# Patient Record
Sex: FEMALE | Race: WHITE | Employment: OTHER | URBAN - METROPOLITAN AREA
[De-identification: names, ages, dates, MRNs, and addresses within clinical notes are randomized per-mention and may not be internally consistent; named-entity substitution may affect disease eponyms.]

---

## 2017-03-15 ENCOUNTER — OFFICE VISIT (OUTPATIENT)
Dept: ORTHOPEDIC SURGERY | Age: 72
End: 2017-03-15

## 2017-03-15 VITALS
BODY MASS INDEX: 33.1 KG/M2 | DIASTOLIC BLOOD PRESSURE: 71 MMHG | OXYGEN SATURATION: 98 % | TEMPERATURE: 97.8 F | WEIGHT: 186.8 LBS | RESPIRATION RATE: 18 BRPM | SYSTOLIC BLOOD PRESSURE: 141 MMHG | HEIGHT: 63 IN | HEART RATE: 89 BPM

## 2017-03-15 DIAGNOSIS — Z98.1 S/P LUMBAR FUSION: ICD-10-CM

## 2017-03-15 DIAGNOSIS — M48.061 LUMBAR SPINAL STENOSIS: ICD-10-CM

## 2017-03-15 DIAGNOSIS — M53.3 CHRONIC LEFT SACROILIAC JOINT PAIN: ICD-10-CM

## 2017-03-15 DIAGNOSIS — M35.3 PMR (POLYMYALGIA RHEUMATICA) (HCC): Chronic | ICD-10-CM

## 2017-03-15 DIAGNOSIS — M53.3 SACROILIAC JOINT DYSFUNCTION: Primary | ICD-10-CM

## 2017-03-15 DIAGNOSIS — G89.29 CHRONIC LEFT SACROILIAC JOINT PAIN: ICD-10-CM

## 2017-03-15 NOTE — PROGRESS NOTES
[facility]  [address] [place] [address], [facility] [address]  Phone: [phone]  Fax: [phone]          HISTORY OF PRESENT ILLNESS:  Vaughn Willoughby is a 73 y.o. female with history of lumbar pain. She states that she recently experienced recurring lower back pain radiating to the left buttock. Pt tried a left SI injection on 09/28/2016 and reports that her pain has returned and is now at a level where she is ready for another injection at this time. She currently takes prednisone 10 mg and is followed by Dr. Laureano Mejia. She is also followed by an endocrinologist since she has been experiencing thyroid issues with the prednisone. Pt continues to take Cymbalta 30 mg. Pt at this time desires to proceed with a left SI injection. Pain Scale: 3/10    PCP: Aislinn Lim MD       Past Medical History:   Diagnosis Date    Arthritis     Hypercholesterolemia     Hypertension     Ill-defined condition     osteopenia    Migraine     PMR [facility]a) ([facility])     Thyroid disease         Social History     Social History    Marital status:      Spouse name: N/A    Number of children: N/A    Years of education: N/A     Occupational History    Not on file. Social History Main Topics    Smoking status: Former Smoker     Packs/day: 1.00     Years: 5.00     Quit date: 1/5/1970    Smokeless tobacco: Former User    Alcohol use 0.5 oz/week     1 Glasses of wine per week      Comment: 4 nights a week    Drug use: No    Sexual activity: Not on file      Comment: Hysterectomy     Other Topics Concern    Not on file     Social History Narrative       Current Outpatient Prescriptions   Medication Sig Dispense Refill    metaxalone (SKELAXIN) 800 mg tablet 1 po bid prn spasm  Indications: MUSCLE SPASM 60 Tab 2    predniSONE (DELTASONE) 5 mg tablet Take 20 mg by mouth daily.       metoprolol succinate (TOPROL-XL) 25 mg XL tablet Take 25 mg by mouth daily.  0    DULoxetine (CYMBALTA) 30 mg capsule Take 30 mg by mouth daily.  LEVOTHYROXINE SODIUM (SYNTHROID PO) Take 100 mcg by mouth daily. 6 days a week      rosuvastatin (CRESTOR) 20 mg tablet Take 20 mg by mouth nightly.  cycloSPORINE (RESTASIS) 0.05 % ophthalmic emulsion Administer 1 Drop to both eyes two (2) times a day.  aspirin 81 mg chewable tablet Take 81 mg by mouth daily.  benazepril (LOTENSIN) 40 mg tablet Take 40 mg by mouth daily.  ALPRAZolam (XANAX) 0.5 mg tablet Take 0.5 mg by mouth.  acetaminophen (TYLENOL EXTRA STRENGTH) 500 mg tablet Take  by mouth every six (6) hours as needed for Pain. Allergies   Allergen Reactions    Levaquin [Levofloxacin] Nausea and Vomiting         REVIEW OF SYSTEMS    Constitutional: Negative for fever, chills, or weight change. Respiratory: Negative for cough or shortness of breath. Cardiovascular: Negative for chest pain or palpitations. Gastrointestinal: Negative for acid reflux, change in bowel habits, or constipation. Genitourinary: Negative for dysuria and flank pain. Musculoskeletal: Positive for lumbar pain. Skin: Negative for rash. Neurological: Negative for headaches, dizziness, or numbness. Endo/Heme/Allergies: Negative for increased bruising. Psychiatric/Behavioral: Negative for difficulty with sleep. PHYSICAL EXAMINATION  Visit Vitals    /71    Pulse 89    Temp 97.8 °F (36.6 °C) (Oral)    Resp 18    Ht 5' 3\" (1.6 m)    Wt 186 lb 12.8 oz (84.7 kg)    SpO2 98%    BMI 33.09 kg/m2       Constitutional: Awake, alert, and in no acute distress  Neurological: 1+ symmetrical DTRs in the lower extremities. Sensation to light touch is intact. Skin: warm, dry, and intact. Musculoskeletal: Tight across the upper trapezius. Tenderness to palpation over the left SI joint injection. No pain with extension, axial loading, or forward flexion. No pain with internal or external rotation of her hips. Negative straight leg raise bilaterally. Hip Flex  Quads Hamstrings Ankle DF EHL Ankle PF   Right +4/5 +4/5 +4/5 +4/5 +4/5 +4/5   Left +4/5 +4/5 +4/5 +4/5 +4/5 +4/5     IMAGING:    Lumbar Spine MRI from 09/22/2016 was personally reviewed with the Pt and demonstrated:   FINDINGS:  Scoliosis of the lumbar spine convex to the left. Grade 1 retrolisthesis L2 on  L3 and L3 on L4. Grade 1 anterior listhesis of L4 on L5. Degenerative discogenic  disease at all levels of the lumbar spine most prominent at L2-L3 where there is  moderate to severe disc space narrowing. Vertebral body heights are maintained. Marrow signal is within normal limits The conus medullaris terminates at  T12-L1. No abnormal enhancement of the thecal sac on postcontrast images. Susceptibility artifact from posterior fusion L2-L5 somewhat limits evaluation  and causes distortion. 2.4 x 0.6 x 0.6 cm fluid collection at the right  laminotomy site at L3-L4. 6.5 cm in length by 1.6 x 1.3 cm lobular T2  hyperintense collection in the left posterior paraspinal soft tissues. There may  be additional small collection posterior to the left spinal canal at L3-L4. Diffuse edema and atrophy of the paraspinal musculature.      L1/2: The spinal canal and neural foramina are widely patent.      L2/3: Diffuse disc bulge and grade 1 retrolisthesis. Mild foraminal stenosis. Mild facet and ligamentous hypertrophy. Central canal is patent.      L3/4: Diffuse disc bulge and slight retrolisthesis. Central canal and neural  foramen are patent.      L4/5: Diffuse disc bulge. Central and neural foramen are patent.      L5/S1: Diffuse disc bulge. Mild/moderate facet arthropathy. Central canal is  patent. Mild left foraminal stenosis.      IMPRESSION:   Posterior fusion L2-L5.  Central canal is patent at these levels.      Fluid collection at the right hemilaminotomy site and in the adjacent posterior  paraspinal musculature most likely representing postoperative fluid  collection/seroma, abscess is unlikely. There may be additional small fluid  posterior to the left the spinal canal at L3-L4.      Mild degenerative changes at L5-S1.    ASSESSMENT   Yessenia Luis was seen today for back pain. Diagnoses and all orders for this visit:    Sacroiliac joint dysfunction  -     SCHEDULE SURGERY    Chronic left sacroiliac joint pain    Lumbar spinal stenosis    PMR (polymyalgia rheumatica) (HCC)    S/P lumbar fusion       IMPRESSION AND PLAN:  Cristo Liao is a 73 y.o. female with history of lumbar pain. She states that she recently experienced recurring lower back pain radiating to the left buttock. Pt tried a left SI injection on 09/28/2016 and reports that she is ready for another injection at this time. 1) Pt was given information on SI exercises. 2) She was scheduled for a left SI joint injection. 3) I encouraged the Pt to start a HEP. 4) Ms. Tiffanie Fernandez has a reminder for a \"due or due soon\" health maintenance. I have asked that she contact her primary care provider, Enrique Berry MD, for follow-up on this health maintenance. 5)  reviewed. 6) Pt will follow-up in 1 month.       Written by Bladimir Ivy, as dictated by Kristal Beltran MD.

## 2017-03-15 NOTE — PATIENT INSTRUCTIONS
Sacroiliac Pain: Exercises  Your Care Instructions  Here are some examples of typical rehabilitation exercises for your condition. Start each exercise slowly. Ease off the exercise if you start to have pain. Your doctor or physical therapist will tell you when you can start these exercises and which ones will work best for you. How to do the exercises  Knee-to-chest stretch    Do not do the knee-to-chest exercise if it causes or increases back or leg pain. 1. Lie on your back with your knees bent and your feet flat on the floor. You can put a small pillow under your head and neck if it is more comfortable. 2. Grasp your hands under one knee and bring the knee to your chest, keeping the other foot flat on the floor. 3. Keep your lower back pressed to the floor. Hold for at least 15 to 30 seconds. 4. Relax and lower the knee to the starting position. Repeat with the other leg. 5. Repeat 2 to 4 times with each leg. 6. To get more stretch, keep your other leg flat on the floor while pulling your knee to your chest.  Bridging    1. Lie on your back with both knees bent. Your knees should be bent about 90 degrees. 2. Tighten your belly muscles by pulling in your belly button toward your spine. Then push your feet into the floor, squeeze your buttocks, and lift your hips off the floor until your shoulders, hips, and knees are all in a straight line. 3. Hold for about 6 seconds as you continue to breathe normally, and then slowly lower your hips back down to the floor and rest for up to 10 seconds. 4. Repeat 8 to 12 times. Hip extension    1. Get down on your hands and knees on the floor. 2. Keeping your back and neck straight, lift one leg straight out behind you. When you lift your leg, keep your hips level. Don't let your back twist, and don't let your hip drop toward the floor. 3. Hold for 6 seconds. Repeat 8 to 12 times with each leg.   4. If you feel steady and strong when you do this exercise, you can make it more difficult. To do this, when you lift your leg, also lift the opposite arm straight out in front of you. For example, lift the left leg and the right arm at the same time. (This is sometimes called the \"bird dog exercise. \") Hold for 6 seconds, and repeat 8 to 12 times on each side. Clamshell    1. Lie on your side with a pillow under your head. Keep your feet and knees together and your knees bent. 2. Raise your top knee, but keep your feet together. Do not let your hips roll back. Your legs should open up like a clamshell. 3. Hold for 6 seconds. 4. Slowly lower your knee back down. Rest for 10 seconds. 5. Repeat 8 to 12 times. 6. Switch to your other side and repeat steps 1 through 5. Hamstring wall stretch    1. Lie on your back in a doorway, with one leg through the open door. 2. Slide your affected leg up the wall to straighten your knee. You should feel a gentle stretch down the back of your leg. ¨ Do not arch your back. ¨ Do not bend either knee. ¨ Keep one heel touching the floor and the other heel touching the wall. Do not point your toes. 3. Hold the stretch for at least 1 minute to begin. Then try to lengthen the time you hold the stretch to as long as 6 minutes. 4. Switch legs, and repeat steps 1 through 3.  5. Repeat 2 to 4 times. If you do not have a place to do this exercise in a doorway, there is another way to do it:  1. Lie on your back, and bend one knee. 2. Loop a towel under the ball and toes of that foot, and hold the ends of the towel in your hands. 3. Straighten your knee, and slowly pull back on the towel. You should feel a gentle stretch down the back of your leg. 4. Switch legs, and repeat steps 1 through 3.  5. Repeat 2 to 4 times. Lower abdominal strengthening    1. Lie on your back with your knees bent and your feet flat on the floor. 2. Tighten your belly muscles by pulling your belly button in toward your spine.   3. Lift one foot off the floor and bring your knee toward your chest, so that your knee is straight above your hip and your leg is bent like the letter \"L. \"  4. Lift the other knee up to the same position. 5. Lower one leg at a time to the starting position. 6. Keep alternating legs until you have lifted each leg 8 to 12 times. 7. Be sure to keep your belly muscles tight and your back still as you are moving your legs. Be sure to breathe normally. Piriformis stretch    1. Lie on your back with your legs straight. 2. Lift your affected leg, and bend your knee. With your opposite hand, reach across your body, and then gently pull your knee toward your opposite shoulder. 3. Hold the stretch for 15 to 30 seconds. 4. Switch legs and repeat steps 1 through 3.  5. Repeat 2 to 4 times. Follow-up care is a key part of your treatment and safety. Be sure to make and go to all appointments, and call your doctor if you are having problems. It's also a good idea to know your test results and keep a list of the medicines you take. Where can you learn more? Go to http://geovanna-tish.info/. Enter E528 in the search box to learn more about \"Sacroiliac Pain: Exercises. \"  Current as of: May 23, 2016  Content Version: 11.1  © 8273-1270 [employer]. Care instructions adapted under license by [employer] (which disclaims liability or warranty for this information). If you have questions about a medical condition or this instruction, always ask your healthcare professional. [address] any warranty or liability for your use of this information.

## 2017-03-29 ENCOUNTER — APPOINTMENT (OUTPATIENT)
Dept: GENERAL RADIOLOGY | Age: 72
End: 2017-03-29
Attending: PHYSICAL MEDICINE & REHABILITATION
Payer: MEDICARE

## 2017-03-29 ENCOUNTER — HOSPITAL ENCOUNTER (OUTPATIENT)
Age: 72
Setting detail: OUTPATIENT SURGERY
Discharge: HOME | End: 2017-03-29
Attending: PHYSICAL MEDICINE & REHABILITATION | Admitting: PHYSICAL MEDICINE & REHABILITATION
Payer: MEDICARE

## 2017-03-29 ENCOUNTER — SURGERY (OUTPATIENT)
Age: 72
End: 2017-03-29

## 2017-03-29 VITALS
SYSTOLIC BLOOD PRESSURE: 167 MMHG | HEART RATE: 91 BPM | WEIGHT: 186 LBS | BODY MASS INDEX: 32.96 KG/M2 | HEIGHT: 63 IN | OXYGEN SATURATION: 93 % | TEMPERATURE: 98.5 F | RESPIRATION RATE: 18 BRPM | DIASTOLIC BLOOD PRESSURE: 87 MMHG

## 2017-03-29 PROCEDURE — 74011636320 HC RX REV CODE- 636/320: Performed by: PHYSICAL MEDICINE & REHABILITATION

## 2017-03-29 PROCEDURE — 74011636320 HC RX REV CODE- 636/320

## 2017-03-29 PROCEDURE — 74011000250 HC RX REV CODE- 250: Performed by: PHYSICAL MEDICINE & REHABILITATION

## 2017-03-29 PROCEDURE — 76010000009 HC PAIN MGT 0 TO 30 MIN PROC: Performed by: PHYSICAL MEDICINE & REHABILITATION

## 2017-03-29 PROCEDURE — 74011250636 HC RX REV CODE- 250/636

## 2017-03-29 PROCEDURE — 74011250636 HC RX REV CODE- 250/636: Performed by: PHYSICAL MEDICINE & REHABILITATION

## 2017-03-29 PROCEDURE — 74011000250 HC RX REV CODE- 250

## 2017-03-29 RX ORDER — DEXAMETHASONE SODIUM PHOSPHATE 100 MG/10ML
INJECTION INTRAMUSCULAR; INTRAVENOUS AS NEEDED
Status: DISCONTINUED | OUTPATIENT
Start: 2017-03-29 | End: 2017-03-29 | Stop reason: HOSPADM

## 2017-03-29 RX ORDER — LIDOCAINE HYDROCHLORIDE 10 MG/ML
INJECTION, SOLUTION EPIDURAL; INFILTRATION; INTRACAUDAL; PERINEURAL AS NEEDED
Status: DISCONTINUED | OUTPATIENT
Start: 2017-03-29 | End: 2017-03-29 | Stop reason: HOSPADM

## 2017-03-29 RX ORDER — DIAZEPAM 5 MG/1
5-20 TABLET ORAL ONCE
Status: DISCONTINUED | OUTPATIENT
Start: 2017-03-29 | End: 2017-03-29 | Stop reason: HOSPADM

## 2017-03-29 RX ADMIN — LIDOCAINE HYDROCHLORIDE 10 ML: 10 INJECTION, SOLUTION EPIDURAL; INFILTRATION; INTRACAUDAL; PERINEURAL at 13:57

## 2017-03-29 RX ADMIN — IOPAMIDOL 2 ML: 408 INJECTION, SOLUTION INTRATHECAL at 13:58

## 2017-03-29 RX ADMIN — DEXAMETHASONE SODIUM PHOSPHATE 30 MG: 10 INJECTION INTRAMUSCULAR; INTRAVENOUS at 13:58

## 2017-03-29 NOTE — PROCEDURES
Procedure Note    Patient Name: Severino Conte    Date of Procedure: March 29, 2017    Preoperative Diagnosis: Sacroiliac Joint Dysfunction    Post Operative Diagnosis: same    Procedure: SI Joint Injection left     Consent: Informed consent was obtained prior to the procedure. The patient was given the opportunity to ask questions regarding the procedure and its associated risks. In addition to the potential risks associated with the procedure itself, the patient was informed both verbally and in writing of potential side effects of the use of glucocorticoids. The patient appeared to comprehend the informed consent and desired to have the procedure performed. Procedure: The patient was placed in the prone position on the flouroscopy table and the back was prepped and draped in the usual sterile manner. A #22 gauge spinal needle was then advanced to lie within the SI joint after local Lidocaine 1% injection. A total of 30 mg of preservative free dexamethasone and 5 cc of Lidocaine was introduced into the SI joint. The injection area was cleaned and bandaids applied. No excessive bleeding was noted. Patient dressed and was discharged to home with instructions. Discussion: The patient tolerated the procedure well.      Juan Luis Wallis MD  March 29, 2017

## 2017-03-29 NOTE — H&P
Date of Surgery Update:  Tobias Smith was seen and examined. History and physical has been reviewed. The patient has been examined. There have been no significant clinical changes since the completion of the last office visit.       Signed By: Yan Nava MD     March 29, 2017 1:01 PM

## 2017-03-29 NOTE — DISCHARGE INSTRUCTIONS
[facility]   ([facility])  Dr. Nida Mantilla, Dr. Gisselle Tamayo, Dr. Pantoja Bent not drive a car, operate heavy machinery or dangerous equipment for 24 hours. * Activity as tolerated; rest for the remainder of the day. * Resume pre-block medications including those for your family doctor. * Do not drink alcoholic beverages for 24 hours. Alcohol and the medications you have received may interact and cause an adverse reaction. * You may feel better this evening and worse tomorrow, as the numbing medications wears off and the steroid has yet to begin to work. After 48 hrs the steroid should begin to release bringing you relief. * You may shower this evening and remove any bandages. * Avoid hot tubs and heating pads for 24 hours. You may use cold packs on the procedure site as tolerated for the first 24 hours. * If a headache develops, drink plenty of fluids and rest.  Take over the counter medications for headache if needed. If the headache continues longer than 24 hours, call MD at the [address] [place] [address]. [phone]    * Continue taking pain medications as needed. * You may resume your regular diet if tolerated. Otherwise, start with sips of water and advance slowly. * If Diabetic: check your blood sugar three times a day for the next 3 days. If your sugar is greater than 300 call your family doctor. If your sugar is greater than 400, have someone transport you to the nearest Emergency Room. * If you experience any of the following problems, Please Call the [address] [place] [address] at [phone].         * Shortness of Breath    * Fever of 101 or higher    * Nausea / Vomiting    * Severe Headache    * Weakness or numbness in arms or legs that is not      resolving    * Prolonged increase in pain greater than 4 days      DISCHARGE SUMMARY from Nurse      PATIENT INSTRUCTIONS:    After oral sedation, for 24 hours or while taking prescription Narcotics:  · Limit your activities  · Do not drive and operate hazardous machinery  · Do not make important personal or business decisions  · Do  not drink alcoholic beverages  · If you have not urinated within 8 hours after discharge, please contact your surgeon on call. Report the following to your surgeon:  · Excessive pain, swelling, redness or odor of or around the surgical area  · Temperature over 101  · Nausea and vomiting lasting longer than 4 hours or if unable to take medications  · Any signs of decreased circulation or nerve impairment to extremity: change in color, persistent  numbness, tingling, coldness or increase pain  · Any questions            What to do at Home:  Recommended activity: Activity as tolerated, NO DRIVING FOR 12 Hours post injection          *  Please give a list of your current medications to your Primary Care Provider. *  Please update this list whenever your medications are discontinued, doses are      changed, or new medications (including over-the-counter products) are added. *  Please carry medication information at all times in case of emergency situations. These are general instructions for a healthy lifestyle:    No smoking/ No tobacco products/ Avoid exposure to second hand smoke    Surgeon General's Warning:  Quitting smoking now greatly reduces serious risk to your health. Obesity, smoking, and sedentary lifestyle greatly increases your risk for illness    A healthy diet, regular physical exercise & weight monitoring are important for maintaining a healthy lifestyle    You may be retaining fluid if you have a history of heart failure or if you experience any of the following symptoms:  Weight gain of 3 pounds or more overnight or 5 pounds in a week, increased swelling in our hands or feet or shortness of breath while lying flat in bed.   Please call your doctor as soon as you notice any of these symptoms; do not wait until your next office visit. Recognize signs and symptoms of STROKE:    F-face looks uneven    A-arms unable to move or move unevenly    S-speech slurred or non-existent    T-time-call 911 as soon as signs and symptoms begin-DO NOT go       Back to bed or wait to see if you get better-TIME IS BRAIN. [employer] Activation    Thank you for requesting access to [employer]. Please follow the instructions below to securely access and download your online medical record. [employer] allows you to send messages to your doctor, view your test results, renew your prescriptions, schedule appointments, and more. How Do I Sign Up? 1. In your internet browser, go to [email or web address]  2. Click on the First Time User? Click Here link in the Sign In box. You will be redirect to the New Member Sign Up page. 3. Enter your [employer] Access Code exactly as it appears below. You will not need to use this code after youve completed the sign-up process. If you do not sign up before the expiration date, you must request a new code. [employer] Access Code: Activation code not generated  Current [employer] Status: Active (This is the date your [employer] access code will )    4. Enter the last four digits of your Social Security Number (xxxx) and Date of Birth (mm/dd/yyyy) as indicated and click Submit. You will be taken to the next sign-up page. 5. Create a [employer] ID. This will be your [employer] login ID and cannot be changed, so think of one that is secure and easy to remember. 6. Create a [employer] password. You can change your password at any time. 7. Enter your Password Reset Question and Answer. This can be used at a later time if you forget your password. 8. Enter your e-mail address. You will receive e-mail notification when new information is available in [address] 9. Click Sign Up. You can now view and download portions of your medical record.   10. Click the Download Summary menu link to download a portable copy of your medical information. Additional Information    If you have questions, please visit the Frequently Asked Questions section of the [email or web address] website at [email or web address] Remember, [email or web address] is NOT to be used for urgent needs. For medical emergencies, dial 911.

## 2017-06-13 ENCOUNTER — OFFICE VISIT (OUTPATIENT)
Dept: ORTHOPEDIC SURGERY | Age: 72
End: 2017-06-13

## 2017-06-13 VITALS
OXYGEN SATURATION: 96 % | WEIGHT: 186 LBS | RESPIRATION RATE: 18 BRPM | BODY MASS INDEX: 32.96 KG/M2 | HEART RATE: 78 BPM | TEMPERATURE: 97.8 F | SYSTOLIC BLOOD PRESSURE: 136 MMHG | DIASTOLIC BLOOD PRESSURE: 66 MMHG | HEIGHT: 63 IN

## 2017-06-13 DIAGNOSIS — Z98.1 S/P LUMBAR FUSION: ICD-10-CM

## 2017-06-13 DIAGNOSIS — M53.3 SACROILIAC JOINT DYSFUNCTION: Primary | ICD-10-CM

## 2017-06-13 DIAGNOSIS — M62.830 MUSCLE SPASM OF BACK: ICD-10-CM

## 2017-06-13 DIAGNOSIS — M43.16 SPONDYLOLISTHESIS OF LUMBAR REGION: ICD-10-CM

## 2017-06-13 DIAGNOSIS — M35.3 PMR (POLYMYALGIA RHEUMATICA) (HCC): Chronic | ICD-10-CM

## 2017-06-13 RX ORDER — DULOXETIN HYDROCHLORIDE 60 MG/1
60 CAPSULE, DELAYED RELEASE ORAL DAILY
COMMUNITY
Start: 2017-05-26

## 2017-06-13 RX ORDER — LIOTHYRONINE SODIUM 5 UG/1
5 TABLET ORAL DAILY
Status: ON HOLD | COMMUNITY
End: 2019-06-19

## 2017-06-13 RX ORDER — ALENDRONATE SODIUM 70 MG/1
70 TABLET ORAL
Status: ON HOLD | COMMUNITY
Start: 2017-05-25 | End: 2019-06-19

## 2017-06-13 RX ORDER — DICLOFENAC EPOLAMINE 0.01 G/1
1 PATCH TOPICAL EVERY 12 HOURS
Qty: 60 PATCH | Refills: 2 | Status: SHIPPED | OUTPATIENT
Start: 2017-06-13 | End: 2017-08-10 | Stop reason: SDUPTHER

## 2017-06-13 RX ORDER — CIPROFLOXACIN HYDROCHLORIDE 3.5 MG/ML
SOLUTION/ DROPS TOPICAL
Refills: 0 | COMMUNITY
Start: 2017-06-11 | End: 2017-08-10 | Stop reason: ALTCHOICE

## 2017-06-13 NOTE — PROGRESS NOTES
[facility]  [address], [facility] [address]  Phone: [phone]  Fax: [phone]      ASSESSMENT   Tiffanie Mcfarlane was seen today for back pain. Diagnoses and all orders for this visit:    Sacroiliac joint dysfunction  -     diclofenac (FLECTOR) 1.3 % pt12; 1 Patch by TransDERmal route every twelve (12) hours every twelve (12) hours. Apply to left hip. Indications: PMR, SI Joint dysfunction    Spondylolisthesis of lumbar region    S/P lumbar fusion    Muscle spasm of back    PMR (polymyalgia rheumatica) (HCC)  -     diclofenac (FLECTOR) 1.3 % pt12; 1 Patch by TransDERmal route every twelve (12) hours every twelve (12) hours. Apply to left hip. Indications: PMR, SI Joint dysfunction         IMPRESSION AND PLAN:  Justin Guevara is a 72 yo female with history of SI pain. She completed injection on 03/15/17 but it did not seem to help as much this time. She will start on SI exercises and Diclofenac patch. 1) Pt was given information on SI exercises. 2) Pt was given prescription for Diclofenac patch. 3) Ms. Florentino Beach has a reminder for a \"due or due soon\" health maintenance. I have asked that she contact her primary care provider, Jose Sanchez MD, for follow-up on this health maintenance. 4)  demonstrated consistency with prescribing. 5) Pt will follow-up in 2 months. HISTORY OF PRESENT ILLNESS:  Justin Guevara is a 73 y.o.  female with history of left SI pain. Pt reports that last SI injection did not help as much as usual. She notes that she went on recent cruise and stairs on the ship aggravated her pain. She did try doing some stretches. She is still taking Prednisone. She continues to follow up with her endocrinologist. She denies any known kidney problems. No history or shingles.        Pain Scale: 3/10    PCP: Jose Sanchez MD     Past Medical History:   Diagnosis Date    Arthritis     Hypercholesterolemia     Hypertension     Ill-defined condition     osteopenia    Migraine     PMR [facility]polym[facility]algia rheumatica) ([facility])     Thyroid disease         Social History     Social History    Marital status:      Spouse name: N/A    Number of children: N/A    Years of education: N/A     Occupational History    Not on file. Social History Main Topics    Smoking status: Former Smoker     Packs/day: 1.00     Years: 5.00     Quit date: 1/5/1970    Smokeless tobacco: Never Used    Alcohol use 0.5 oz/week     1 Glasses of wine per week      Comment: 4 nights a week    Drug use: No    Sexual activity: Not on file      Comment: Hysterectomy     Other Topics Concern    Not on file     Social History Narrative       Current Outpatient Prescriptions   Medication Sig Dispense Refill    alendronate (FOSAMAX) 70 mg tablet       ciprofloxacin HCl (CILOXIN) 0.3 % ophthalmic solution instill 2 drops into left eye twice a day for 5 TO 7 DAYS  0    DULoxetine (CYMBALTA) 60 mg capsule Take 60 mg by mouth daily.  liothyronine (CYTOMEL) 5 mcg tablet Take 5 mcg by mouth every other day.  diclofenac (FLECTOR) 1.3 % pt12 1 Patch by TransDERmal route every twelve (12) hours every twelve (12) hours. Apply to left hip. Indications: PMR, SI Joint dysfunction 60 Patch 2    predniSONE (DELTASONE) 5 mg tablet Take 5 mg by mouth daily.  acetaminophen (TYLENOL EXTRA STRENGTH) 500 mg tablet Take  by mouth every six (6) hours as needed for Pain.  metoprolol succinate (TOPROL-XL) 25 mg XL tablet Take 25 mg by mouth daily. 0    LEVOTHYROXINE SODIUM (SYNTHROID PO) Take 100 mcg by mouth daily. 6 days a week      rosuvastatin (CRESTOR) 20 mg tablet Take 20 mg by mouth nightly.  cycloSPORINE (RESTASIS) 0.05 % ophthalmic emulsion Administer 1 Drop to both eyes two (2) times a day.  aspirin 81 mg chewable tablet Take 81 mg by mouth daily.  benazepril (LOTENSIN) 40 mg tablet Take 40 mg by mouth daily.       ALPRAZolam (XANAX) 0.5 mg tablet Take 0.5 mg by mouth.  metaxalone (SKELAXIN) 800 mg tablet 1 po bid prn spasm  Indications: MUSCLE SPASM 60 Tab 2       Allergies   Allergen Reactions    Levaquin [Levofloxacin] Nausea and Vomiting         REVIEW OF SYSTEMS    Constitutional: Negative for fever, chills, or weight change. Respiratory: Negative for cough or shortness of breath. Cardiovascular: Negative for chest pain or palpitations. Gastrointestinal: Negative for acid reflux, change in bowel habits, or constipation. Genitourinary: Negative for dysuria and flank pain. Musculoskeletal: Positive for left SI pain. Skin: Negative for rash. Neurological: Negative for headaches, dizziness, or numbness. Endo/Heme/Allergies: Negative for increased bruising. Psychiatric/Behavioral: Negative for difficulty with sleep. PHYSICAL EXAMINATION  Visit Vitals    /66    Pulse 78    Temp 97.8 °F (36.6 °C) (Oral)    Resp 18    Ht 5' 3\" (1.6 m)    Wt 186 lb (84.4 kg)    SpO2 96%    BMI 32.95 kg/m2       Constitutional: Awake, alert, and in no acute distress  Neurological: 1+ symmetrical DTRs in the upper extremities. 1+ symmetrical DTRs in the lower extremities. Sensation to light touch is intact. Negative Napoleon's sign bilaterally. Skin: warm, dry, and intact. Musculoskeletal: No pain with extension, axial loading, or forward flexion. No pain with internal or external rotation of her hips. Negative straight leg raise bilaterally. SI pain present (L>R)       Biceps  Triceps Deltoids Wrist Ext Wrist Flex Hand Intrin   Right +4/5 +4/5 +4/5 +4/5 +4/5 +4/5   Left +4/5 +4/5 +4/5 +4/5 +4/5 +4/5      Hip Flex  Quads Hamstrings Ankle DF EHL Ankle PF   Right +4/5 +4/5 +4/5 +4/5 +4/5 +4/5   Left +4/5 +4/5 +4/5 +4/5 +4/5 +4/5     IMAGING:    Lumbar Spine MRI from 09/22/2016 was personally reviewed with the Pt and demonstrated:   FINDINGS:  Scoliosis of the lumbar spine convex to the left.  Grade 1 retrolisthesis L2 on  L3 and L3 on L4. Grade 1 anterior listhesis of L4 on L5. Degenerative discogenic  disease at all levels of the lumbar spine most prominent at L2-L3 where there is  moderate to severe disc space narrowing. Vertebral body heights are maintained. Marrow signal is within normal limits The conus medullaris terminates at  T12-L1. No abnormal enhancement of the thecal sac on postcontrast images. Susceptibility artifact from posterior fusion L2-L5 somewhat limits evaluation  and causes distortion. 2.4 x 0.6 x 0.6 cm fluid collection at the right  laminotomy site at L3-L4. 6.5 cm in length by 1.6 x 1.3 cm lobular T2  hyperintense collection in the left posterior paraspinal soft tissues. There may  be additional small collection posterior to the left spinal canal at L3-L4. Diffuse edema and atrophy of the paraspinal musculature.      L1/2: The spinal canal and neural foramina are widely patent.      L2/3: Diffuse disc bulge and grade 1 retrolisthesis. Mild foraminal stenosis. Mild facet and ligamentous hypertrophy. Central canal is patent.      L3/4: Diffuse disc bulge and slight retrolisthesis. Central canal and neural  foramen are patent.      L4/5: Diffuse disc bulge. Central and neural foramen are patent.      L5/S1: Diffuse disc bulge. Mild/moderate facet arthropathy. Central canal is  patent. Mild left foraminal stenosis.      IMPRESSION:   Posterior fusion L2-L5. Central canal is patent at these levels.      Fluid collection at the right hemilaminotomy site and in the adjacent posterior  paraspinal musculature most likely representing postoperative fluid  collection/seroma, abscess is unlikely. There may be additional small fluid  posterior to the left the spinal canal at L3-L4.      Mild degenerative changes at L5-S1. Written by Corrinne Card, as dictated by Loreto Jackson MD.  I, Dr. Loreto Jackson confirm that all documentation is accurate.

## 2017-06-13 NOTE — MR AVS SNAPSHOT
Visit Information Date & Time Provider Department Dept. Phone Encounter #  
 6/13/2017  9:30 AM Arlene Chin, [address] [facility] [phone] [id number] Follow-up Instructions Return in about 2 months (around 8/13/2017) for Medication follow up. Upcoming Health Maintenance Date Due Hepatitis C Screening 1945 DTaP/Tdap/Td series (1 - Tdap) 11/10/1968 FOBT Q 1 YEAR AGE 50-75 11/10/1997 ZOSTER VACCINE AGE 60> 11/10/2007 GLAUCOMA SCREENING Q2Y 11/10/2012 OSTEOPOROSIS SCREENING (DEXA) 11/10/2012 Pneumococcal 65+ Low/Medium Risk (1 of 2 - PCV13) 11/10/2012 MEDICARE YEARLY EXAM 11/10/2012 BREAST CANCER SCRN MAMMOGRAM 11/3/2013 INFLUENZA AGE 9 TO ADULT 8/1/2017 Allergies as of 6/13/2017  Review Complete On: 6/13/2017 By: Arlene Chin MD  
  
 Severity Noted Reaction Type Reactions Levaquin [Levofloxacin]  03/05/2015    Nausea and Vomiting Current Immunizations  Never Reviewed No immunizations on file. Not reviewed this visit You Were Diagnosed With   
  
 Codes Comments Sacroiliac joint dysfunction    -  Primary ICD-10-CM: M53.3 ICD-9-CM: 724.6 PMR (polymyalgia rheumatica) (HCC)     ICD-10-CM: M35.3 ICD-9-CM: 352 Vitals BP Pulse Temp Resp Height(growth percentile) Weight(growth percentile) 136/66 78 97.8 °F (36.6 °C) (Oral) 18 5' 3\" (1.6 m) 186 lb (84.4 kg) SpO2 BMI OB Status Smoking Status 96% 32.95 kg/m2 Hysterectomy Former Smoker Vitals History BMI and BSA Data Body Mass Index Body Surface Area 32.95 kg/m 2 1.94 m 2 Preferred Pharmacy Pharmacy Name Phone Eladio Llanos, [address] [facility] [phone] Your Updated Medication List  
  
   
This list is accurate as of: 6/13/17 10:33 AM.  Always use your most recent med list.  
  
  
  
  
 alendronate 70 mg tablet Commonly known as:  FOSAMAX  
  
 aspirin 81 mg chewable tablet Take 81 mg by mouth daily. ciprofloxacin HCl 0.3 % ophthalmic solution Commonly known as:  CILOXIN  
instill 2 drops into left eye twice a day for 5 TO 7 DAYS  
  
 CRESTOR 20 mg tablet Generic drug:  rosuvastatin Take 20 mg by mouth nightly. CYTOMEL 5 mcg tablet Generic drug:  liothyronine Take 5 mcg by mouth every other day. diclofenac 1.3 % Pt12 Commonly known as:  FLECTOR  
1 Patch by TransDERmal route every twelve (12) hours every twelve (12) hours. Apply to left hip. Indications: PMR, SI Joint dysfunction DULoxetine 60 mg capsule Commonly known as:  CYMBALTA Take 60 mg by mouth daily. LOTENSIN 40 mg tablet Generic drug:  benazepril Take 40 mg by mouth daily. metaxalone 800 mg tablet Commonly known as:  SKELAXIN  
1 po bid prn spasm  Indications: MUSCLE SPASM  
  
 metoprolol succinate 25 mg XL tablet Commonly known as:  TOPROL-XL Take 25 mg by mouth daily. predniSONE 5 mg tablet Commonly known as:  Mike Jeffers Take 5 mg by mouth daily. RESTASIS 0.05 % ophthalmic emulsion Generic drug:  cycloSPORINE Administer 1 Drop to both eyes two (2) times a day. SYNTHROID PO Take 100 mcg by mouth daily. 6 days a week TYLENOL EXTRA STRENGTH 500 mg tablet Generic drug:  acetaminophen Take  by mouth every six (6) hours as needed for Pain. XANAX 0.5 mg tablet Generic drug:  ALPRAZolam  
Take 0.5 mg by mouth. Prescriptions Printed Refills  
 diclofenac (FLECTOR) 1.3 % pt12 2 Si Patch by TransDERmal route every twelve (12) hours every twelve (12) hours. Apply to left hip. Indications: PMR, SI Joint dysfunction Class: Print Route: TransDERmal  
  
Follow-up Instructions Return in about 2 months (around 2017) for Medication follow up. Introducing [facility]!    
 Dear Alberta Ramirez: 
 Thank you for requesting a [employer] account. Our records indicate that you already have an active [employer] account. You can access your account anytime at [email or web address]. [email or web address] Did you know that you can access your hospital and ER discharge instructions at any time in [employer]? You can also review all of your test results from your hospital stay or ER visit. Additional Information If you have questions, please visit the Frequently Asked Questions section of the [employer] website at [email or web address]. [email or web address] Remember, [employer] is NOT to be used for urgent needs. For medical emergencies, dial 911. Now available from your iPhone and Android! Please provide this summary of care documentation to your next provider. Your primary care clinician is listed as Garcia Byrd. If you have any questions after today's visit, please call [phone].

## 2017-06-14 ENCOUNTER — TELEPHONE (OUTPATIENT)
Dept: ORTHOPEDIC SURGERY | Age: 72
End: 2017-06-14

## 2017-06-14 NOTE — TELEPHONE ENCOUNTER
Dani Garza pt  called in states that in order for the pt to get her Flector Rx filled the pt needs a medical necessity form filled out. Dani Garza is trying to pick it up today 06/14/17.     Please advise Dani Garza at [phone]

## 2017-06-14 NOTE — TELEPHONE ENCOUNTER
Form for Medical Necessity for Flector patch was filled out and given to the patients . Copy was also sent to scanning.

## 2017-08-10 ENCOUNTER — OFFICE VISIT (OUTPATIENT)
Dept: ORTHOPEDIC SURGERY | Age: 72
End: 2017-08-10

## 2017-08-10 VITALS
HEIGHT: 63 IN | RESPIRATION RATE: 18 BRPM | DIASTOLIC BLOOD PRESSURE: 62 MMHG | HEART RATE: 103 BPM | SYSTOLIC BLOOD PRESSURE: 144 MMHG | WEIGHT: 186.8 LBS | BODY MASS INDEX: 33.1 KG/M2

## 2017-08-10 DIAGNOSIS — M47.816 LUMBAR FACET ARTHROPATHY: ICD-10-CM

## 2017-08-10 DIAGNOSIS — M53.3 SACROILIAC JOINT DYSFUNCTION: ICD-10-CM

## 2017-08-10 DIAGNOSIS — M62.830 MUSCLE SPASM OF BACK: ICD-10-CM

## 2017-08-10 DIAGNOSIS — M35.3 PMR (POLYMYALGIA RHEUMATICA) (HCC): Chronic | ICD-10-CM

## 2017-08-10 DIAGNOSIS — M48.061 LUMBAR SPINAL STENOSIS: Primary | ICD-10-CM

## 2017-08-10 DIAGNOSIS — Z98.1 S/P LUMBAR SPINAL FUSION: ICD-10-CM

## 2017-08-10 RX ORDER — DICLOFENAC EPOLAMINE 0.01 G/1
1 PATCH TOPICAL EVERY 12 HOURS
Qty: 60 PATCH | Refills: 5 | Status: SHIPPED | OUTPATIENT
Start: 2017-08-10 | End: 2018-02-15 | Stop reason: SDUPTHER

## 2017-08-10 NOTE — MR AVS SNAPSHOT
Visit Information Date & Time Provider Department Dept. Phone Encounter #  
 8/10/2017  2:15 PM Paresh Douglass MD [facility] [place] [phone] Follow-up Instructions Return in about 6 months (around 2/10/2018) for Medication follow up. Upcoming Health Maintenance Date Due Hepatitis C Screening 1945 DTaP/Tdap/Td series (1 - Tdap) 11/10/1968 FOBT Q 1 YEAR AGE 50-75 11/10/1997 ZOSTER VACCINE AGE 60> 9/10/2007 GLAUCOMA SCREENING Q2Y 11/10/2012 OSTEOPOROSIS SCREENING (DEXA) 11/10/2012 Pneumococcal 65+ Low/Medium Risk (1 of 2 - PCV13) 11/10/2012 MEDICARE YEARLY EXAM 11/10/2012 BREAST CANCER SCRN MAMMOGRAM 11/3/2013 INFLUENZA AGE 9 TO ADULT 8/1/2017 Allergies as of 8/10/2017  Review Complete On: 8/10/2017 By: Paresh Douglass MD  
  
 Severity Noted Reaction Type Reactions Levaquin [Levofloxacin]  03/05/2015    Nausea and Vomiting Current Immunizations  Never Reviewed No immunizations on file. Not reviewed this visit You Were Diagnosed With   
  
 Codes Comments Lumbar spinal stenosis    -  Primary ICD-10-CM: M48.06 
ICD-9-CM: 724.02 Sacroiliac joint dysfunction     ICD-10-CM: M53.3 ICD-9-CM: 724.6 PMR (polymyalgia rheumatica) (HCC)     ICD-10-CM: M35.3 ICD-9-CM: 333 S/P lumbar spinal fusion     ICD-10-CM: Z98.1 ICD-9-CM: V45.4 Muscle spasm of back     ICD-10-CM: Y84.039 ICD-9-CM: 724.8 Vitals BP Pulse Resp Height(growth percentile) Weight(growth percentile) BMI  
 144/62 (!) 103 18 5' 3\" (1.6 m) 186 lb 12.8 oz (84.7 kg) 33.09 kg/m2 OB Status Smoking Status Hysterectomy Former Smoker BMI and BSA Data Body Mass Index Body Surface Area 33.09 kg/m 2 1.94 m 2 Preferred Pharmacy Pharmacy Name [address], [facility] [phone] Your Updated Medication List  
  
   
This list is accurate as of: 8/10/17  3:03 PM.  Always use your most recent med list.  
  
  
  
  
 alendronate 70 mg tablet Commonly known as:  FOSAMAX Take 70 mg by mouth every seven (7) days. aspirin 81 mg chewable tablet Take 81 mg by mouth daily. CRESTOR 20 mg tablet Generic drug:  rosuvastatin Take 20 mg by mouth nightly. CYTOMEL 5 mcg tablet Generic drug:  liothyronine Take 5 mcg by mouth daily. diclofenac 1.3 % Pt12 Commonly known as:  FLECTOR  
1 Patch by TransDERmal route every twelve (12) hours every twelve (12) hours. Apply to left hip. Indications: PMR, SI Joint dysfunction DULoxetine 60 mg capsule Commonly known as:  CYMBALTA Take 60 mg by mouth daily. LOTENSIN 40 mg tablet Generic drug:  benazepril Take 40 mg by mouth daily. metaxalone 800 mg tablet Commonly known as:  SKELAXIN  
1 po bid prn spasm  Indications: MUSCLE SPASM  
  
 metoprolol succinate 25 mg XL tablet Commonly known as:  TOPROL-XL Take 25 mg by mouth daily. predniSONE 5 mg tablet Commonly known as:  Alvie [place] Take 5 mg by mouth daily. RESTASIS 0.05 % ophthalmic emulsion Generic drug:  cycloSPORINE Administer 1 Drop to both eyes two (2) times a day. SYNTHROID PO Take 100 mcg by mouth daily. 6 days a week TYLENOL EXTRA STRENGTH 500 mg tablet Generic drug:  acetaminophen Take  by mouth every six (6) hours as needed for Pain. XANAX 0.5 mg tablet Generic drug:  ALPRAZolam  
Take 0.5 mg by mouth. Prescriptions Printed Refills  
 diclofenac (FLECTOR) 1.3 % pt12 5 Si Patch by TransDERmal route every twelve (12) hours every twelve (12) hours. Apply to left hip. Indications: PMR, SI Joint dysfunction Class: Print Route: TransDERmal  
  
Follow-up Instructions Return in about 6 months (around 2/10/2018) for Medication follow up. Patient Instructions Low Back Arthritis: Exercises Your Care Instructions Here are some examples of typical rehabilitation exercises for your condition. Start each exercise slowly. Ease off the exercise if you start to have pain. Your doctor or physical therapist will tell you when you can start these exercises and which ones will work best for you. When you are not being active, find a comfortable position for rest. Some people are comfortable on the floor or a medium-firm bed with a small pillow under their head and another under their knees. Some people prefer to lie on their side with a pillow between their knees. Don't stay in one position for too long. Take short walks (10 to 20 minutes) every 2 to 3 hours. Avoid slopes, hills, and stairs until you feel better. Walk only distances you can manage without pain, especially leg pain. How to do the exercises Pelvic tilt 1. Lie on your back with your knees bent. 2. \"Brace\" your stomachtighten your muscles by pulling in and imagining your belly button moving toward your spine. 3. Press your lower back into the floor. You should feel your hips and pelvis rock back. 4. Hold for 6 seconds while breathing smoothly. 5. Relax and allow your pelvis and hips to rock forward. 6. Repeat 8 to 12 times. Back stretches 1. Get down on your hands and knees on the floor. 2. Relax your head and allow it to droop. Round your back up toward the ceiling until you feel a nice stretch in your upper, middle, and lower back. Hold this stretch for as long as it feels comfortable, or about 15 to 30 seconds. 3. Return to the starting position with a flat back while you are on your hands and knees. 4. Let your back sway by pressing your stomach toward the floor. Lift your buttocks toward the ceiling. 5. Hold this position for 15 to 30 seconds. 6. Repeat 2 to 4 times. Follow-up care is a key part of your treatment and safety.  Be sure to make and go to all appointments, and call your doctor if you are having problems. It's also a good idea to know your test results and keep a list of the medicines you take. Where can you learn more? Go to http://geovanna-tish.info/. Enter O140 in the search box to learn more about \"Low Back Arthritis: Exercises. \" Current as of: March 21, 2017 Content Version: 11.3 © 9519-6434 [employer]. Care instructions adapted under license by [employer] (which disclaims liability or warranty for this information). If you have questions about a medical condition or this instruction, always ask your healthcare professional. [place] [address] any warranty or liability for your use of this information. Sacroiliac Pain: Exercises Your Care Instructions Here are some examples of typical rehabilitation exercises for your condition. Start each exercise slowly. Ease off the exercise if you start to have pain. Your doctor or physical therapist will tell you when you can start these exercises and which ones will work best for you. How to do the exercises Knee-to-chest stretch Do not do the knee-to-chest exercise if it causes or increases back or leg pain. 7. Lie on your back with your knees bent and your feet flat on the floor. You can put a small pillow under your head and neck if it is more comfortable. 8. Grasp your hands under one knee and bring the knee to your chest, keeping the other foot flat on the floor. 9. Keep your lower back pressed to the floor. Hold for at least 15 to 30 seconds. 10. Relax and lower the knee to the starting position. Repeat with the other leg. 11. Repeat 2 to 4 times with each leg. 12. To get more stretch, keep your other leg flat on the floor while pulling your knee to your chest. 
Bridging 7. Lie on your back with both knees bent. Your knees should be bent about 90 degrees. 8. Tighten your belly muscles by pulling in your belly button toward your spine. Then push your feet into the floor, squeeze your buttocks, and lift your hips off the floor until your shoulders, hips, and knees are all in a straight line. 9. Hold for about 6 seconds as you continue to breathe normally, and then slowly lower your hips back down to the floor and rest for up to 10 seconds. 10. Repeat 8 to 12 times. Hip extension 1. Get down on your hands and knees on the floor. 2. Keeping your back and neck straight, lift one leg straight out behind you. When you lift your leg, keep your hips level. Don't let your back twist, and don't let your hip drop toward the floor. 3. Hold for 6 seconds. Repeat 8 to 12 times with each leg. 4. If you feel steady and strong when you do this exercise, you can make it more difficult. To do this, when you lift your leg, also lift the opposite arm straight out in front of you. For example, lift the left leg and the right arm at the same time. (This is sometimes called the \"bird dog exercise. \") Hold for 6 seconds, and repeat 8 to 12 times on each side. Clamshell 1. Lie on your side with a pillow under your head. Keep your feet and knees together and your knees bent. 2. Raise your top knee, but keep your feet together. Do not let your hips roll back. Your legs should open up like a clamshell. 3. Hold for 6 seconds. 4. Slowly lower your knee back down. Rest for 10 seconds. 5. Repeat 8 to 12 times. 6. Switch to your other side and repeat steps 1 through 5. Hamstring wall stretch 1. Lie on your back in a doorway, with one leg through the open door. 2. Slide your affected leg up the wall to straighten your knee. You should feel a gentle stretch down the back of your leg. ¨ Do not arch your back. ¨ Do not bend either knee. ¨ Keep one heel touching the floor and the other heel touching the wall. Do not point your toes. 3. Hold the stretch for at least 1 minute to begin. Then try to lengthen the time you hold the stretch to as long as 6 minutes. 4. Switch legs, and repeat steps 1 through 3. 
5. Repeat 2 to 4 times. If you do not have a place to do this exercise in a doorway, there is another way to do it: 1. Lie on your back, and bend one knee. 2. Loop a towel under the ball and toes of that foot, and hold the ends of the towel in your hands. 3. Straighten your knee, and slowly pull back on the towel. You should feel a gentle stretch down the back of your leg. 4. Switch legs, and repeat steps 1 through 3. 
5. Repeat 2 to 4 times. Lower abdominal strengthening 1. Lie on your back with your knees bent and your feet flat on the floor. 2. Tighten your belly muscles by pulling your belly button in toward your spine. 3. Lift one foot off the floor and bring your knee toward your chest, so that your knee is straight above your hip and your leg is bent like the letter \"L. \" 
4. Lift the other knee up to the same position. 5. Lower one leg at a time to the starting position. 6. Keep alternating legs until you have lifted each leg 8 to 12 times. 7. Be sure to keep your belly muscles tight and your back still as you are moving your legs. Be sure to breathe normally. Piriformis stretch 1. Lie on your back with your legs straight. 2. Lift your affected leg, and bend your knee. With your opposite hand, reach across your body, and then gently pull your knee toward your opposite shoulder. 3. Hold the stretch for 15 to 30 seconds. 4. Switch legs and repeat steps 1 through 3. 
5. Repeat 2 to 4 times. Follow-up care is a key part of your treatment and safety. Be sure to make and go to all appointments, and call your doctor if you are having problems. It's also a good idea to know your test results and keep a list of the medicines you take. Where can you learn more? Go to http://geovanna-tish.info/. Enter I337 in the search box to learn more about \"Sacroiliac Pain: Exercises. \" Current as of: March 21, 2017 Content Version: 11.3 © 7218-0960 [employer]. Care instructions adapted under license by [employer] (which disclaims liability or warranty for this information). If you have questions about a medical condition or this instruction, always ask your healthcare professional. [address] any warranty or liability for your use of this information. Introducing \[facility] [place] [facility]\"" & HEALTH SERVICES! Dear Lissa Hernández: Thank you for requesting a [employer] account. Our records indicate that you already have an active [employer] account. You can access your account anytime at [email or web address] Did you know that you can access your hospital and ER discharge instructions at any time in [employer]? You can also review all of your test results from your hospital stay or ER visit. Additional Information If you have questions, please visit the Frequently Asked Questions section of the [employer] website at [email or web address] Remember, [employer] is NOT to be used for urgent needs. For medical emergencies, dial 911. Now available from your iPhone and Android! Please provide this summary of care documentation to your next provider. Your primary care clinician is listed as Diamond Palacio. If you have any questions after today's visit, please call [phone].

## 2017-08-10 NOTE — PATIENT INSTRUCTIONS
Low Back Arthritis: Exercises  Your Care Instructions  Here are some examples of typical rehabilitation exercises for your condition. Start each exercise slowly. Ease off the exercise if you start to have pain. Your doctor or physical therapist will tell you when you can start these exercises and which ones will work best for you. When you are not being active, find a comfortable position for rest. Some people are comfortable on the floor or a medium-firm bed with a small pillow under their head and another under their knees. Some people prefer to lie on their side with a pillow between their knees. Don't stay in one position for too long. Take short walks (10 to 20 minutes) every 2 to 3 hours. Avoid slopes, hills, and stairs until you feel better. Walk only distances you can manage without pain, especially leg pain. How to do the exercises  Pelvic tilt    1. Lie on your back with your knees bent. 2. \"Brace\" your stomach--tighten your muscles by pulling in and imagining your belly button moving toward your spine. 3. Press your lower back into the floor. You should feel your hips and pelvis rock back. 4. Hold for 6 seconds while breathing smoothly. 5. Relax and allow your pelvis and hips to rock forward. 6. Repeat 8 to 12 times. Back stretches    1. Get down on your hands and knees on the floor. 2. Relax your head and allow it to droop. Round your back up toward the ceiling until you feel a nice stretch in your upper, middle, and lower back. Hold this stretch for as long as it feels comfortable, or about 15 to 30 seconds. 3. Return to the starting position with a flat back while you are on your hands and knees. 4. Let your back sway by pressing your stomach toward the floor. Lift your buttocks toward the ceiling. 5. Hold this position for 15 to 30 seconds. 6. Repeat 2 to 4 times. Follow-up care is a key part of your treatment and safety.  Be sure to make and go to all appointments, and call your doctor if you are having problems. It's also a good idea to know your test results and keep a list of the medicines you take. Where can you learn more? Go to http://geovanna-tish.info/. Enter P020 in the search box to learn more about \"Low Back Arthritis: Exercises. \"  Current as of: March 21, 2017  Content Version: 11.3  © 0791-1649 [employer]. Care instructions adapted under license by [employer] (which disclaims liability or warranty for this information). If you have questions about a medical condition or this instruction, always ask your healthcare professional. [place] [address] any warranty or liability for your use of this information. Sacroiliac Pain: Exercises  Your Care Instructions  Here are some examples of typical rehabilitation exercises for your condition. Start each exercise slowly. Ease off the exercise if you start to have pain. Your doctor or physical therapist will tell you when you can start these exercises and which ones will work best for you. How to do the exercises  Knee-to-chest stretch    Do not do the knee-to-chest exercise if it causes or increases back or leg pain. 7. Lie on your back with your knees bent and your feet flat on the floor. You can put a small pillow under your head and neck if it is more comfortable. 8. Grasp your hands under one knee and bring the knee to your chest, keeping the other foot flat on the floor. 9. Keep your lower back pressed to the floor. Hold for at least 15 to 30 seconds. 10. Relax and lower the knee to the starting position. Repeat with the other leg. 11. Repeat 2 to 4 times with each leg. 12. To get more stretch, keep your other leg flat on the floor while pulling your knee to your chest.  Bridging    7. Lie on your back with both knees bent. Your knees should be bent about 90 degrees. 8. Tighten your belly muscles by pulling in your belly button toward your spine.  Then push your feet into the floor, squeeze your buttocks, and lift your hips off the floor until your shoulders, hips, and knees are all in a straight line. 9. Hold for about 6 seconds as you continue to breathe normally, and then slowly lower your hips back down to the floor and rest for up to 10 seconds. 10. Repeat 8 to 12 times. Hip extension    1. Get down on your hands and knees on the floor. 2. Keeping your back and neck straight, lift one leg straight out behind you. When you lift your leg, keep your hips level. Don't let your back twist, and don't let your hip drop toward the floor. 3. Hold for 6 seconds. Repeat 8 to 12 times with each leg. 4. If you feel steady and strong when you do this exercise, you can make it more difficult. To do this, when you lift your leg, also lift the opposite arm straight out in front of you. For example, lift the left leg and the right arm at the same time. (This is sometimes called the \"bird dog exercise. \") Hold for 6 seconds, and repeat 8 to 12 times on each side. Clamshell    1. Lie on your side with a pillow under your head. Keep your feet and knees together and your knees bent. 2. Raise your top knee, but keep your feet together. Do not let your hips roll back. Your legs should open up like a clamshell. 3. Hold for 6 seconds. 4. Slowly lower your knee back down. Rest for 10 seconds. 5. Repeat 8 to 12 times. 6. Switch to your other side and repeat steps 1 through 5. Hamstring wall stretch    1. Lie on your back in a doorway, with one leg through the open door. 2. Slide your affected leg up the wall to straighten your knee. You should feel a gentle stretch down the back of your leg. ¨ Do not arch your back. ¨ Do not bend either knee. ¨ Keep one heel touching the floor and the other heel touching the wall. Do not point your toes. 3. Hold the stretch for at least 1 minute to begin. Then try to lengthen the time you hold the stretch to as long as 6 minutes.   4. Switch legs, and repeat steps 1 through 3.  5. Repeat 2 to 4 times. If you do not have a place to do this exercise in a doorway, there is another way to do it:  1. Lie on your back, and bend one knee. 2. Loop a towel under the ball and toes of that foot, and hold the ends of the towel in your hands. 3. Straighten your knee, and slowly pull back on the towel. You should feel a gentle stretch down the back of your leg. 4. Switch legs, and repeat steps 1 through 3.  5. Repeat 2 to 4 times. Lower abdominal strengthening    1. Lie on your back with your knees bent and your feet flat on the floor. 2. Tighten your belly muscles by pulling your belly button in toward your spine. 3. Lift one foot off the floor and bring your knee toward your chest, so that your knee is straight above your hip and your leg is bent like the letter \"L. \"  4. Lift the other knee up to the same position. 5. Lower one leg at a time to the starting position. 6. Keep alternating legs until you have lifted each leg 8 to 12 times. 7. Be sure to keep your belly muscles tight and your back still as you are moving your legs. Be sure to breathe normally. Piriformis stretch    1. Lie on your back with your legs straight. 2. Lift your affected leg, and bend your knee. With your opposite hand, reach across your body, and then gently pull your knee toward your opposite shoulder. 3. Hold the stretch for 15 to 30 seconds. 4. Switch legs and repeat steps 1 through 3.  5. Repeat 2 to 4 times. Follow-up care is a key part of your treatment and safety. Be sure to make and go to all appointments, and call your doctor if you are having problems. It's also a good idea to know your test results and keep a list of the medicines you take. Where can you learn more? Go to http://geovanna-tish.info/. Enter P119 in the search box to learn more about \"Sacroiliac Pain: Exercises. \"  Current as of: March 21, 2017  Content Version: 11.3  © 7815-4764 [employer]. Care instructions adapted under license by [employer] (which disclaims liability or warranty for this information). If you have questions about a medical condition or this instruction, always ask your healthcare professional. [address] any warranty or liability for your use of this information.

## 2017-08-10 NOTE — PROGRESS NOTES
[facility]  [address]lyn [address]cdonald[address] [place], [facility] [address]  Phone: [phone]  Fax: [phone]      ASSESSMENT   Diagnoses and all orders for this visit:    1. Lumbar spinal stenosis    2. Sacroiliac joint dysfunction  -     diclofenac (FLECTOR) 1.3 % pt12; 1 Patch by TransDERmal route every twelve (12) hours every twelve (12) hours. Apply to left hip. Indications: PMR, SI Joint dysfunction    3. PMR (polymyalgia rheumatica) ([facility])    4. Lumbar facet arthropathy  -     diclofenac (FLECTOR) 1.3 % pt12; 1 Patch by TransDERmal route every twelve (12) hours every twelve (12) hours. Apply to left hip. Indications: PMR, SI Joint dysfunction    5. Muscle spasm of back    6. S/P lumbar spinal fusion         IMPRESSION AND PLAN:  Gadiel Arita is a 73 y.o. female with history of lumbar and left sacroiliac pain. Pt reports improvement in her pain since her last office visit since using Flector 1.3% patches. 1) Pt was given information on lumbar arthritis and SI exercises. 2) She received a refill of Flector 1.3% patches Q12 hours. 3) Ms. Laurent Becerril has a reminder for a \"due or due soon\" health maintenance. I have asked that she contact her primary care provider, Allison Chavarria MD, for follow-up on this health maintenance. 4)  demonstrated consistency with prescribing. 5) Pt will follow-up in 6 months. HISTORY OF PRESENT ILLNESS:  Gadiel Arita is a 73 y.o. female with history of lumbar and left sacroiliac pain. Pt reports improvement in her pain since her last office visit. She has been using Flector 1.3% patches with significant relief. Pt admits to she continues to experience some left sacroiliac pain but this is not as severe as it was at her last office visit. She reports that she like to walk and get her 10,000 a day. Pt at this time desires to continue with current care.     Pain Scale: 2/10    PCP: Allison Chavarria MD       Past Medical History:   Diagnosis Date    Arthritis     Hypercholesterolemia     Hypertension     Ill-defined condition     osteopenia    Migraine     PMR (polymyalgia rheumatica) ([facility])     Thyroid disease         Social History     Social History    Marital status:      Spouse name: N/A    Number of children: N/A    Years of education: N/A     Occupational History    Not on file. Social History Main Topics    Smoking status: Former Smoker     Packs/day: 1.00     Years: 5.00     Quit date: 1/5/1970    Smokeless tobacco: Never Used    Alcohol use 0.5 oz/week     1 Glasses of wine per week      Comment: 4 nights a week    Drug use: No    Sexual activity: Not on file      Comment: Hysterectomy     Other Topics Concern    Not on file     Social History Narrative       Current Outpatient Prescriptions   Medication Sig Dispense Refill    diclofenac (FLECTOR) 1.3 % pt12 1 Patch by TransDERmal route every twelve (12) hours every twelve (12) hours. Apply to left hip. Indications: PMR, SI Joint dysfunction 60 Patch 5    alendronate (FOSAMAX) 70 mg tablet Take 70 mg by mouth every seven (7) days.  DULoxetine (CYMBALTA) 60 mg capsule Take 60 mg by mouth daily.  liothyronine (CYTOMEL) 5 mcg tablet Take 5 mcg by mouth daily.  metaxalone (SKELAXIN) 800 mg tablet 1 po bid prn spasm  Indications: MUSCLE SPASM 60 Tab 2    predniSONE (DELTASONE) 5 mg tablet Take 5 mg by mouth daily.  acetaminophen (TYLENOL EXTRA STRENGTH) 500 mg tablet Take  by mouth every six (6) hours as needed for Pain.  metoprolol succinate (TOPROL-XL) 25 mg XL tablet Take 25 mg by mouth daily. 0    LEVOTHYROXINE SODIUM (SYNTHROID PO) Take 100 mcg by mouth daily. 6 days a week      rosuvastatin (CRESTOR) 20 mg tablet Take 20 mg by mouth nightly.  cycloSPORINE (RESTASIS) 0.05 % ophthalmic emulsion Administer 1 Drop to both eyes two (2) times a day.  aspirin 81 mg chewable tablet Take 81 mg by mouth daily.  benazepril (LOTENSIN) 40 mg tablet Take 40 mg by mouth daily.  ALPRAZolam (XANAX) 0.5 mg tablet Take 0.5 mg by mouth. Allergies   Allergen Reactions    Levaquin [Levofloxacin] Nausea and Vomiting         REVIEW OF SYSTEMS    Constitutional: Negative for fever, chills, or weight change. Respiratory: Negative for cough or shortness of breath. Cardiovascular: Negative for chest pain or palpitations. Gastrointestinal: Negative for acid reflux, change in bowel habits, or constipation. Genitourinary: Negative for dysuria and flank pain. Musculoskeletal: Positive for lumbar pain. Skin: Negative for rash. Neurological: Negative for headaches, dizziness, or numbness. Endo/Heme/Allergies: Negative for increased bruising. Psychiatric/Behavioral: Negative for difficulty with sleep. PHYSICAL EXAMINATION  Visit Vitals    /62    Pulse (!) 103    Resp 18    Ht 5' 3\" (1.6 m)    Wt 186 lb 12.8 oz (84.7 kg)    BMI 33.09 kg/m2       Constitutional: Awake, alert, and in no acute distress  Neurological: 1+ symmetrical DTRs in the lower extremities. Sensation to light touch is intact. Skin: warm, dry, and intact. Musculoskeletal: Tenderness to palpation over the left sacroiliac joint. No pain with extension, axial loading, or forward flexion. No pain with internal or external rotation of her hips. Negative straight leg raise bilaterally. Hip Flex  Quads Hamstrings Ankle DF EHL Ankle PF   Right +4/5 +4/5 +4/5 +4/5 +4/5 +4/5   Left +4/5 +4/5 +4/5 +4/5 +4/5 +4/5     IMAGING:    Lumbar Spine MRI from 09/22/2016 was personally reviewed with the Pt and demonstrated:   FINDINGS:  Scoliosis of the lumbar spine convex to the left. Grade 1 retrolisthesis L2 on  L3 and L3 on L4. Grade 1 anterior listhesis of L4 on L5. Degenerative discogenic  disease at all levels of the lumbar spine most prominent at L2-L3 where there is  moderate to severe disc space narrowing.  Vertebral body heights are maintained. Marrow signal is within normal limits The conus medullaris terminates at  T12-L1. No abnormal enhancement of the thecal sac on postcontrast images. Susceptibility artifact from posterior fusion L2-L5 somewhat limits evaluation  and causes distortion. 2.4 x 0.6 x 0.6 cm fluid collection at the right  laminotomy site at L3-L4. 6.5 cm in length by 1.6 x 1.3 cm lobular T2  hyperintense collection in the left posterior paraspinal soft tissues. There may  be additional small collection posterior to the left spinal canal at L3-L4. Diffuse edema and atrophy of the paraspinal musculature.      L1/2: The spinal canal and neural foramina are widely patent.      L2/3: Diffuse disc bulge and grade 1 retrolisthesis. Mild foraminal stenosis. Mild facet and ligamentous hypertrophy. Central canal is patent.      L3/4: Diffuse disc bulge and slight retrolisthesis. Central canal and neural  foramen are patent.      L4/5: Diffuse disc bulge. Central and neural foramen are patent.      L5/S1: Diffuse disc bulge. Mild/moderate facet arthropathy. Central canal is  patent. Mild left foraminal stenosis.      IMPRESSION:   Posterior fusion L2-L5. Central canal is patent at these levels.      Fluid collection at the right hemilaminotomy site and in the adjacent posterior  paraspinal musculature most likely representing postoperative fluid  collection/seroma, abscess is unlikely. There may be additional small fluid  posterior to the left the spinal canal at L3-L4.      Mild degenerative changes at L5-S1. Written by Jessica Butterfield, as dictated by Dolores Treviño MD.  I, Dr. Dolores Treviño confirm that all documentation is accurate.

## 2018-02-15 ENCOUNTER — OFFICE VISIT (OUTPATIENT)
Dept: ORTHOPEDIC SURGERY | Age: 73
End: 2018-02-15

## 2018-02-15 VITALS — BODY MASS INDEX: 32.39 KG/M2 | RESPIRATION RATE: 16 BRPM | WEIGHT: 182.8 LBS | HEIGHT: 63 IN

## 2018-02-15 DIAGNOSIS — Z98.1 S/P LUMBAR SPINAL FUSION: ICD-10-CM

## 2018-02-15 DIAGNOSIS — M53.3 SACROILIAC JOINT DYSFUNCTION: ICD-10-CM

## 2018-02-15 DIAGNOSIS — M47.816 LUMBAR FACET ARTHROPATHY: Primary | ICD-10-CM

## 2018-02-15 DIAGNOSIS — M62.830 MUSCLE SPASM OF BACK: ICD-10-CM

## 2018-02-15 DIAGNOSIS — M43.16 SPONDYLOLISTHESIS OF LUMBAR REGION: ICD-10-CM

## 2018-02-15 RX ORDER — PREDNISONE 1 MG/1
3 TABLET ORAL DAILY
COMMUNITY
Start: 2018-02-01

## 2018-02-15 RX ORDER — DICLOFENAC EPOLAMINE 0.01 G/1
1 PATCH TOPICAL EVERY 12 HOURS
Qty: 60 PATCH | Refills: 5 | Status: SHIPPED | OUTPATIENT
Start: 2018-02-15 | End: 2019-02-28 | Stop reason: SDUPTHER

## 2018-02-15 NOTE — MR AVS SNAPSHOT
[facility] 
 
 
 [address] [facility] 
[phone] Patient: Sarah Seaman MRN: [id number] Visit Information Date & Time Provider Department Dept. Phone Encounter #  
 2/15/2018  9:30 AM Aris Harrington, [address] [facility] [place] [phone] [id number] Follow-up Instructions Return in about 6 months (around 8/15/2018) for Medication follow up. Upcoming Health Maintenance Date Due Hepatitis C Screening 1945 DTaP/Tdap/Td series (1 - Tdap) 11/10/1968 FOBT Q 1 YEAR AGE 50-75 11/10/1997 ZOSTER VACCINE AGE 60> 9/10/2007 GLAUCOMA SCREENING Q2Y 11/10/2012 OSTEOPOROSIS SCREENING (DEXA) 11/10/2012 Pneumococcal 65+ Low/Medium Risk (1 of 2 - PCV13) 11/10/2012 MEDICARE YEARLY EXAM 11/10/2012 BREAST CANCER SCRN MAMMOGRAM 11/3/2013 Influenza Age 5 to Adult 8/1/2017 Allergies as of 2/15/2018  Review Complete On: 2/15/2018 By: Aris Harrington MD  
  
 Severity Noted Reaction Type Reactions Levaquin [Levofloxacin]  03/05/2015    Nausea and Vomiting Current Immunizations  Never Reviewed No immunizations on file. Not reviewed this visit You Were Diagnosed With   
  
 Codes Comments Sacroiliac joint dysfunction     ICD-10-CM: M53.3 ICD-9-CM: 724.6 Lumbar facet arthropathy     ICD-10-CM: M12.88 ICD-9-CM: 721.3 Vitals Resp Height(growth percentile) Weight(growth percentile) BMI OB Status Smoking Status 16 5' 3\" (1.6 m) 182 lb 12.8 oz (82.9 kg) 32.38 kg/m2 Hysterectomy Former Smoker BMI and BSA Data Body Mass Index Body Surface Area  
 32.38 kg/m 2 1.92 m 2 Preferred Pharmacy Pharmacy Name Phone [facility]la [facility] [phone] Your Updated Medication List  
  
   
This list is accurate as of: 2/15/18  9:53 AM.  Always use your most recent med list.  
  
  
  
  
 alendronate 70 mg tablet Commonly known as:  FOSAMAX Take 70 mg by mouth every seven (7) days. aspirin 81 mg chewable tablet Take 81 mg by mouth daily. CRESTOR 20 mg tablet Generic drug:  rosuvastatin Take 20 mg by mouth nightly. CYTOMEL 5 mcg tablet Generic drug:  liothyronine Take 5 mcg by mouth daily. diclofenac 1.3 % Pt12 Commonly known as:  FLECTOR  
1 Patch by TransDERmal route every twelve (12) hours every twelve (12) hours. Apply to left hip. Indications: PMR, SI Joint dysfunction DULoxetine 60 mg capsule Commonly known as:  CYMBALTA Take 60 mg by mouth daily. LOTENSIN 40 mg tablet Generic drug:  benazepril Take 40 mg by mouth daily. metaxalone 800 mg tablet Commonly known as:  SKELAXIN  
1 po bid prn spasm  Indications: MUSCLE SPASM  
  
 metoprolol succinate 25 mg XL tablet Commonly known as:  TOPROL-XL Take 25 mg by mouth daily. predniSONE 1 mg tablet Commonly known as:  Valdez Clancy Take 3 mg by mouth daily. RESTASIS 0.05 % ophthalmic emulsion Generic drug:  cycloSPORINE Administer 1 Drop to both eyes two (2) times a day. SYNTHROID PO Take 75 mcg by mouth daily. 6 days a week TYLENOL EXTRA STRENGTH 500 mg tablet Generic drug:  acetaminophen Take  by mouth every six (6) hours as needed for Pain. XANAX 0.5 mg tablet Generic drug:  ALPRAZolam  
Take 0.5 mg by mouth. Prescriptions Printed Refills  
 diclofenac (FLECTOR) 1.3 % pt12 5 Si Patch by TransDERmal route every twelve (12) hours every twelve (12) hours. Apply to left hip. Indications: PMR, SI Joint dysfunction Class: Print Route: TransDERmal  
  
Follow-up Instructions Return in about 6 months (around 8/15/2018) for Medication follow up. Patient Instructions Low Back Arthritis: Exercises Your Care Instructions Here are some examples of typical rehabilitation exercises for your condition. Start each exercise slowly. Ease off the exercise if you start to have pain. Your doctor or physical therapist will tell you when you can start these exercises and which ones will work best for you. When you are not being active, find a comfortable position for rest. Some people are comfortable on the floor or a medium-firm bed with a small pillow under their head and another under their knees. Some people prefer to lie on their side with a pillow between their knees. Don't stay in one position for too long. Take short walks (10 to 20 minutes) every 2 to 3 hours. Avoid slopes, hills, and stairs until you feel better. Walk only distances you can manage without pain, especially leg pain. How to do the exercises Pelvic tilt 1. Lie on your back with your knees bent. 2. \"Brace\" your stomach-tighten your muscles by pulling in and imagining your belly button moving toward your spine. 3. Press your lower back into the floor. You should feel your hips and pelvis rock back. 4. Hold for 6 seconds while breathing smoothly. 5. Relax and allow your pelvis and hips to rock forward. 6. Repeat 8 to 12 times. Back stretches 1. Get down on your hands and knees on the floor. 2. Relax your head and allow it to droop. Round your back up toward the ceiling until you feel a nice stretch in your upper, middle, and lower back. Hold this stretch for as long as it feels comfortable, or about 15 to 30 seconds. 3. Return to the starting position with a flat back while you are on your hands and knees. 4. Let your back sway by pressing your stomach toward the floor. Lift your buttocks toward the ceiling. 5. Hold this position for 15 to 30 seconds. 6. Repeat 2 to 4 times. Follow-up care is a key part of your treatment and safety.  Be sure to make and go to all appointments, and call your doctor if you are having problems. It's also a good idea to know your test results and keep a list of the medicines you take. Where can you learn more? Go to http://geovanna-tish.info/. Enter W144 in the search box to learn more about \"Low Back Arthritis: Exercises. \" Current as of: March 21, 2017 Content Version: 11.4 © 1030-0085 [employer]. Care instructions adapted under license by [employer] (which disclaims liability or warranty for this information). If you have questions about a medical condition or this instruction, always ask your healthcare professional. [place] [address] any warranty or liability for your use of this information. Sacroiliac Pain: Exercises Your Care Instructions Here are some examples of typical rehabilitation exercises for your condition. Start each exercise slowly. Ease off the exercise if you start to have pain. Your doctor or physical therapist will tell you when you can start these exercises and which ones will work best for you. How to do the exercises Knee-to-chest stretch 7. Do not do the knee-to-chest exercise if it causes or increases back or leg pain. 8. Lie on your back with your knees bent and your feet flat on the floor. You can put a small pillow under your head and neck if it is more comfortable. 9. Grasp your hands under one knee and bring the knee to your chest, keeping the other foot flat on the floor. 10. Keep your lower back pressed to the floor. Hold for at least 15 to 30 seconds. 11. Relax and lower the knee to the starting position. Repeat with the other leg. 12. Repeat 2 to 4 times with each leg. 13. To get more stretch, keep your other leg flat on the floor while pulling your knee to your chest. 
Bridging 7. Lie on your back with both knees bent. Your knees should be bent about 90 degrees.  
8. Tighten your belly muscles by pulling in your belly button toward your spine. Then push your feet into the floor, squeeze your buttocks, and lift your hips off the floor until your shoulders, hips, and knees are all in a straight line. 9. Hold for about 6 seconds as you continue to breathe normally, and then slowly lower your hips back down to the floor and rest for up to 10 seconds. 10. Repeat 8 to 12 times. Hip extension 1. Get down on your hands and knees on the floor. 2. Keeping your back and neck straight, lift one leg straight out behind you. When you lift your leg, keep your hips level. Don't let your back twist, and don't let your hip drop toward the floor. 3. Hold for 6 seconds. Repeat 8 to 12 times with each leg. 4. If you feel steady and strong when you do this exercise, you can make it more difficult. To do this, when you lift your leg, also lift the opposite arm straight out in front of you. For example, lift the left leg and the right arm at the same time. (This is sometimes called the \"bird dog exercise. \") Hold for 6 seconds, and repeat 8 to 12 times on each side. Clamshell 1. Lie on your side with a pillow under your head. Keep your feet and knees together and your knees bent. 2. Raise your top knee, but keep your feet together. Do not let your hips roll back. Your legs should open up like a clamshell. 3. Hold for 6 seconds. 4. Slowly lower your knee back down. Rest for 10 seconds. 5. Repeat 8 to 12 times. 6. Switch to your other side and repeat steps 1 through 5. Hamstring wall stretch 1. Lie on your back in a doorway, with one leg through the open door. 2. Slide your affected leg up the wall to straighten your knee. You should feel a gentle stretch down the back of your leg. 1. Do not arch your back. 2. Do not bend either knee. 3. Keep one heel touching the floor and the other heel touching the wall. Do not point your toes. 3. Hold the stretch for at least 1 minute to begin.  Then try to lengthen the time you hold the stretch to as long as 6 minutes. 4. Switch legs, and repeat steps 1 through 3. 
5. Repeat 2 to 4 times. 6. If you do not have a place to do this exercise in a doorway, there is another way to do it: 
7. Lie on your back, and bend one knee. 8. Loop a towel under the ball and toes of that foot, and hold the ends of the towel in your hands. 9. Straighten your knee, and slowly pull back on the towel. You should feel a gentle stretch down the back of your leg. 10. Switch legs, and repeat steps 1 through 3. 
11. Repeat 2 to 4 times. Lower abdominal strengthening 1. Lie on your back with your knees bent and your feet flat on the floor. 2. Tighten your belly muscles by pulling your belly button in toward your spine. 3. Lift one foot off the floor and bring your knee toward your chest, so that your knee is straight above your hip and your leg is bent like the letter \"L. \" 
4. Lift the other knee up to the same position. 5. Lower one leg at a time to the starting position. 6. Keep alternating legs until you have lifted each leg 8 to 12 times. 7. Be sure to keep your belly muscles tight and your back still as you are moving your legs. Be sure to breathe normally. Piriformis stretch 1. Lie on your back with your legs straight. 2. Lift your affected leg, and bend your knee. With your opposite hand, reach across your body, and then gently pull your knee toward your opposite shoulder. 3. Hold the stretch for 15 to 30 seconds. 4. Switch legs and repeat steps 1 through 3. 
5. Repeat 2 to 4 times. Follow-up care is a key part of your treatment and safety. Be sure to make and go to all appointments, and call your doctor if you are having problems. It's also a good idea to know your test results and keep a list of the medicines you take. Where can you learn more? Go to http://geovanna-tish.info/.  
Enter H173 in the search box to learn more about \"Sacroiliac Pain: Exercises. \" Current as of: March 21, 2017 Content Version: 11.4 © 1961-3027 [employer]. Care instructions adapted under license by [employer] (which disclaims liability or warranty for this information). If you have questions about a medical condition or this instruction, always ask your healthcare professional. [address] any warranty or liability for your use of this information. Introducing [facility]! Dear Tung Young: Thank you for requesting a [email or web address] account. Our records indicate that you already have an active [email or web address] account. You can access your account anytime at [email or web address] Did you know that you can access your hospital and ER discharge instructions at any time in [email or web address]? You can also review all of your test results from your hospital stay or ER visit. Additional Information If you have questions, please visit the Frequently Asked Questions section of the [email or web address] website at [email or web address] Remember, [email or web address] is NOT to be used for urgent needs. For medical emergencies, dial 911. Now available from your iPhone and Android! Please provide this summary of care documentation to your next provider. Your primary care clinician is listed as Bruce Castleman. If you have any questions after today's visit, please call [phone].

## 2018-02-15 NOTE — PATIENT INSTRUCTIONS
Low Back Arthritis: Exercises  Your Care Instructions  Here are some examples of typical rehabilitation exercises for your condition. Start each exercise slowly. Ease off the exercise if you start to have pain. Your doctor or physical therapist will tell you when you can start these exercises and which ones will work best for you. When you are not being active, find a comfortable position for rest. Some people are comfortable on the floor or a medium-firm bed with a small pillow under their head and another under their knees. Some people prefer to lie on their side with a pillow between their knees. Don't stay in one position for too long. Take short walks (10 to 20 minutes) every 2 to 3 hours. Avoid slopes, hills, and stairs until you feel better. Walk only distances you can manage without pain, especially leg pain. How to do the exercises  Pelvic tilt    1. Lie on your back with your knees bent. 2. \"Brace\" your stomach-tighten your muscles by pulling in and imagining your belly button moving toward your spine. 3. Press your lower back into the floor. You should feel your hips and pelvis rock back. 4. Hold for 6 seconds while breathing smoothly. 5. Relax and allow your pelvis and hips to rock forward. 6. Repeat 8 to 12 times. Back stretches    1. Get down on your hands and knees on the floor. 2. Relax your head and allow it to droop. Round your back up toward the ceiling until you feel a nice stretch in your upper, middle, and lower back. Hold this stretch for as long as it feels comfortable, or about 15 to 30 seconds. 3. Return to the starting position with a flat back while you are on your hands and knees. 4. Let your back sway by pressing your stomach toward the floor. Lift your buttocks toward the ceiling. 5. Hold this position for 15 to 30 seconds. 6. Repeat 2 to 4 times. Follow-up care is a key part of your treatment and safety.  Be sure to make and go to all appointments, and call your doctor if you are having problems. It's also a good idea to know your test results and keep a list of the medicines you take. Where can you learn more? Go to http://geovanna-tish.info/. Enter F385 in the search box to learn more about \"Low Back Arthritis: Exercises. \"  Current as of: March 21, 2017  Content Version: 11.4  © 8899-3509 [employer]. Care instructions adapted under license by "[employer]" (which disclaims liability or warranty for this information). If you have questions about a medical condition or this instruction, always ask your healthcare professional. [place] [address] any warranty or liability for your use of this information. Sacroiliac Pain: Exercises  Your Care Instructions  Here are some examples of typical rehabilitation exercises for your condition. Start each exercise slowly. Ease off the exercise if you start to have pain. Your doctor or physical therapist will tell you when you can start these exercises and which ones will work best for you. How to do the exercises  Knee-to-chest stretch    7. Do not do the knee-to-chest exercise if it causes or increases back or leg pain. 8. Lie on your back with your knees bent and your feet flat on the floor. You can put a small pillow under your head and neck if it is more comfortable. 9. Grasp your hands under one knee and bring the knee to your chest, keeping the other foot flat on the floor. 10. Keep your lower back pressed to the floor. Hold for at least 15 to 30 seconds. 11. Relax and lower the knee to the starting position. Repeat with the other leg. 12. Repeat 2 to 4 times with each leg. 13. To get more stretch, keep your other leg flat on the floor while pulling your knee to your chest.  Bridging    7. Lie on your back with both knees bent. Your knees should be bent about 90 degrees. 8. Tighten your belly muscles by pulling in your belly button toward your spine.  Then push your feet into the floor, squeeze your buttocks, and lift your hips off the floor until your shoulders, hips, and knees are all in a straight line. 9. Hold for about 6 seconds as you continue to breathe normally, and then slowly lower your hips back down to the floor and rest for up to 10 seconds. 10. Repeat 8 to 12 times. Hip extension    1. Get down on your hands and knees on the floor. 2. Keeping your back and neck straight, lift one leg straight out behind you. When you lift your leg, keep your hips level. Don't let your back twist, and don't let your hip drop toward the floor. 3. Hold for 6 seconds. Repeat 8 to 12 times with each leg. 4. If you feel steady and strong when you do this exercise, you can make it more difficult. To do this, when you lift your leg, also lift the opposite arm straight out in front of you. For example, lift the left leg and the right arm at the same time. (This is sometimes called the \"bird dog exercise. \") Hold for 6 seconds, and repeat 8 to 12 times on each side. Clamshell    1. Lie on your side with a pillow under your head. Keep your feet and knees together and your knees bent. 2. Raise your top knee, but keep your feet together. Do not let your hips roll back. Your legs should open up like a clamshell. 3. Hold for 6 seconds. 4. Slowly lower your knee back down. Rest for 10 seconds. 5. Repeat 8 to 12 times. 6. Switch to your other side and repeat steps 1 through 5. Hamstring wall stretch    1. Lie on your back in a doorway, with one leg through the open door. 2. Slide your affected leg up the wall to straighten your knee. You should feel a gentle stretch down the back of your leg. 1. Do not arch your back. 2. Do not bend either knee. 3. Keep one heel touching the floor and the other heel touching the wall. Do not point your toes. 3. Hold the stretch for at least 1 minute to begin.  Then try to lengthen the time you hold the stretch to as long as 6 minutes. 4. Switch legs, and repeat steps 1 through 3.  5. Repeat 2 to 4 times. 6. If you do not have a place to do this exercise in a doorway, there is another way to do it:  7. Lie on your back, and bend one knee. 8. Loop a towel under the ball and toes of that foot, and hold the ends of the towel in your hands. 9. Straighten your knee, and slowly pull back on the towel. You should feel a gentle stretch down the back of your leg. 10. Switch legs, and repeat steps 1 through 3.  11. Repeat 2 to 4 times. Lower abdominal strengthening    1. Lie on your back with your knees bent and your feet flat on the floor. 2. Tighten your belly muscles by pulling your belly button in toward your spine. 3. Lift one foot off the floor and bring your knee toward your chest, so that your knee is straight above your hip and your leg is bent like the letter \"L. \"  4. Lift the other knee up to the same position. 5. Lower one leg at a time to the starting position. 6. Keep alternating legs until you have lifted each leg 8 to 12 times. 7. Be sure to keep your belly muscles tight and your back still as you are moving your legs. Be sure to breathe normally. Piriformis stretch    1. Lie on your back with your legs straight. 2. Lift your affected leg, and bend your knee. With your opposite hand, reach across your body, and then gently pull your knee toward your opposite shoulder. 3. Hold the stretch for 15 to 30 seconds. 4. Switch legs and repeat steps 1 through 3.  5. Repeat 2 to 4 times. Follow-up care is a key part of your treatment and safety. Be sure to make and go to all appointments, and call your doctor if you are having problems. It's also a good idea to know your test results and keep a list of the medicines you take. Where can you learn more? Go to http://geovanna-tish.info/. Enter P127 in the search box to learn more about \"Sacroiliac Pain: Exercises. \"  Current as of: March 21, 2017  Content Version: 11.4  © 7766-1710 [employer]. Care instructions adapted under license by [employer] (which disclaims liability or warranty for this information). If you have questions about a medical condition or this instruction, always ask your healthcare professional. [address] any warranty or liability for your use of this information.

## 2018-02-15 NOTE — PROGRESS NOTES
[facility]  [address] [place], [facility][note 3] [address]  Phone: [phone]  Fax: [phone]    Pt's YOB: 1945    ASSESSMENT   Diagnoses and all orders for this visit:    1. Lumbar facet arthropathy  -     diclofenac (FLECTOR) 1.3 % pt12; 1 Patch by TransDERmal route every twelve (12) hours every twelve (12) hours. Apply to left hip. Indications: PMR, SI Joint dysfunction    2. Sacroiliac joint dysfunction  -     diclofenac (FLECTOR) 1.3 % pt12; 1 Patch by TransDERmal route every twelve (12) hours every twelve (12) hours. Apply to left hip. Indications: PMR, SI Joint dysfunction    3. Spondylolisthesis of lumbar region    4. Muscle spasm of back    5. S/P lumbar spinal fusion         IMPRESSION AND PLAN:  Ovidio Rodriguez is a 81 y.o. female with history of lumbar pain. Pt experiences substantial relief with Flector patches. She states that she is able to walk longer distances and generally experiences no pain when using the Flector patches. Pt takes prednisone 3 mg daily and has a history of polymyalgia rheumatica. Of note, she was diagnosed with polymyalgia rheumatica about 10 years ago. Pt at this time desires to continue with current care. 1) Pt was given information on lumbar arthritis and sacroiliac exercises. 2) She received a refill of Flector 1.3% patches. 3) Pt may try decreasing her Cymbalta to address her diaphoresis. 4) Ms. Fran Da Silva has a reminder for a \"due or due soon\" health maintenance. I have asked that she contact her primary care provider, Uday Chew MD, for follow-up on this health maintenance. 5)  demonstrated consistency with prescribing. 6) Pt will follow-up in 6 months or sooner if needed. HISTORY OF PRESENT ILLNESS:  Ovidio Rodriguez is a 81 y.o. female with history of lumbar pain. Pt experiences substantial relief with Flector patches.  She states that she is able to walk longer distances and generally experiences no pain when using the Flector patches. Pt takes prednisone 3 mg daily and has a history of polymyalgia rheumatica. Of note, she was diagnosed with polymyalgia rheumatica about 10 years ago. She has been taking Cymbalta 60 mg for about 3 years as prescribed by her PCP. Pt reports that she has experience hyperhidrosis for quite some time but recently this has worsened. She has not tried decreasing her Cymbalta. Pt at this time desires to continue with current care. Pain Scale: 0 - No pain/10    PCP: Krystyna Arevalo MD     Past Medical History:   Diagnosis Date    Arthritis     Hypercholesterolemia     Hypertension     Ill-defined condition     osteopenia    Migraine     PM[facility]ym[facility]a) ([facility])     Thyroid disease         Social History     Social History    Marital status:      Spouse name: N/A    Number of children: N/A    Years of education: N/A     Occupational History    Not on file. Social History Main Topics    Smoking status: Former Smoker     Packs/day: 1.00     Years: 5.00     Quit date: 1/5/1970    Smokeless tobacco: Never Used    Alcohol use 0.5 oz/week     1 Glasses of wine per week      Comment: 4 nights a week    Drug use: No    Sexual activity: Not on file      Comment: Hysterectomy     Other Topics Concern    Not on file     Social History Narrative       Current Outpatient Prescriptions   Medication Sig Dispense Refill    predniSONE (DELTASONE) 1 mg tablet Take 3 mg by mouth daily.  diclofenac (FLECTOR) 1.3 % pt12 1 Patch by TransDERmal route every twelve (12) hours every twelve (12) hours. Apply to left hip. Indications: PMR, SI Joint dysfunction 60 Patch 5    alendronate (FOSAMAX) 70 mg tablet Take 70 mg by mouth every seven (7) days.  DULoxetine (CYMBALTA) 60 mg capsule Take 60 mg by mouth daily.  liothyronine (CYTOMEL) 5 mcg tablet Take 5 mcg by mouth daily.       acetaminophen (TYLENOL EXTRA STRENGTH) 500 mg tablet Take  by mouth every six (6) hours as needed for Pain.  metoprolol succinate (TOPROL-XL) 25 mg XL tablet Take 25 mg by mouth daily. 0    LEVOTHYROXINE SODIUM (SYNTHROID PO) Take 75 mcg by mouth daily. 6 days a week       rosuvastatin (CRESTOR) 20 mg tablet Take 20 mg by mouth nightly.  cycloSPORINE (RESTASIS) 0.05 % ophthalmic emulsion Administer 1 Drop to both eyes two (2) times a day.  aspirin 81 mg chewable tablet Take 81 mg by mouth daily.  benazepril (LOTENSIN) 40 mg tablet Take 40 mg by mouth daily.  ALPRAZolam (XANAX) 0.5 mg tablet Take 0.5 mg by mouth.  metaxalone (SKELAXIN) 800 mg tablet 1 po bid prn spasm  Indications: MUSCLE SPASM 60 Tab 2       Allergies   Allergen Reactions    Levaquin [Levofloxacin] Nausea and Vomiting         REVIEW OF SYSTEMS    Constitutional: Negative for fever, chills, or weight change. Respiratory: Negative for cough or shortness of breath. Cardiovascular: Negative for chest pain or palpitations. Gastrointestinal: Negative for acid reflux, change in bowel habits, or constipation. Genitourinary: Negative for dysuria and flank pain. Musculoskeletal: Positive for lumbar pain. Skin: Negative for rash. Neurological: Negative for headaches, dizziness, or numbness. Endo/Heme/Allergies: Negative for increased bruising. Psychiatric/Behavioral: Negative for difficulty with sleep. PHYSICAL EXAMINATION  Visit Vitals    Resp 16    Ht 5' 3\" (1.6 m)    Wt 182 lb 12.8 oz (82.9 kg)    BMI 32.38 kg/m2       Constitutional: Awake, alert, and in no acute distress. Neurological: 1+ symmetrical DTRs in the lower extremities. Sensation to light touch is intact. Skin: warm, dry, and intact. Musculoskeletal: No pain with extension, axial loading, or forward flexion. No pain with internal or external rotation of her hips. Negative straight leg raise bilaterally.      Hip Flex  Quads Hamstrings Ankle DF EHL Ankle PF   Right +4/5 +4/5 +4/5 +4/5 +4/5 +4/5   Left +4/5 +4/5 +4/5 +4/5 +4/5 +4/5     IMAGING:    Lumbar Spine MRI from 09/22/2016 was personally reviewed with the Pt and demonstrated:   FINDINGS:  Scoliosis of the lumbar spine convex to the left. Grade 1 retrolisthesis L2 on  L3 and L3 on L4. Grade 1 anterior listhesis of L4 on L5. Degenerative discogenic  disease at all levels of the lumbar spine most prominent at L2-L3 where there is  moderate to severe disc space narrowing. Vertebral body heights are maintained. Marrow signal is within normal limits The conus medullaris terminates at  T12-L1. No abnormal enhancement of the thecal sac on postcontrast images. Susceptibility artifact from posterior fusion L2-L5 somewhat limits evaluation  and causes distortion. 2.4 x 0.6 x 0.6 cm fluid collection at the right  laminotomy site at L3-L4. 6.5 cm in length by 1.6 x 1.3 cm lobular T2  hyperintense collection in the left posterior paraspinal soft tissues. There may  be additional small collection posterior to the left spinal canal at L3-L4. Diffuse edema and atrophy of the paraspinal musculature.      L1/2: The spinal canal and neural foramina are widely patent.      L2/3: Diffuse disc bulge and grade 1 retrolisthesis. Mild foraminal stenosis. Mild facet and ligamentous hypertrophy. Central canal is patent.      L3/4: Diffuse disc bulge and slight retrolisthesis. Central canal and neural  foramen are patent.      L4/5: Diffuse disc bulge. Central and neural foramen are patent.      L5/S1: Diffuse disc bulge. Mild/moderate facet arthropathy. Central canal is  patent. Mild left foraminal stenosis.      IMPRESSION:   Posterior fusion L2-L5. Central canal is patent at these levels.      Fluid collection at the right hemilaminotomy site and in the adjacent posterior  paraspinal musculature most likely representing postoperative fluid  collection/seroma, abscess is unlikely.  There may be additional small fluid  posterior to the left the spinal canal at L3-L4.      Mild degenerative changes at L5-S1. Written by Pranav Joiner, as dictated by Malika Clayton MD.  I, Dr. Malika Clayton confirm that all documentation is accurate.

## 2018-11-08 ENCOUNTER — OFFICE VISIT (OUTPATIENT)
Dept: ORTHOPEDIC SURGERY | Age: 73
End: 2018-11-08

## 2018-11-08 VITALS
WEIGHT: 182 LBS | HEIGHT: 63 IN | SYSTOLIC BLOOD PRESSURE: 127 MMHG | HEART RATE: 76 BPM | DIASTOLIC BLOOD PRESSURE: 58 MMHG | BODY MASS INDEX: 32.25 KG/M2 | OXYGEN SATURATION: 97 %

## 2018-11-08 DIAGNOSIS — M53.3 SACROILIAC JOINT DYSFUNCTION: ICD-10-CM

## 2018-11-08 DIAGNOSIS — M54.50 LUMBAR PAIN: ICD-10-CM
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
## 2018-11-08 DIAGNOSIS — M47.816 LUMBAR FACET ARTHROPATHY: ICD-10-CM

## 2018-11-08 DIAGNOSIS — Z98.1 S/P LUMBAR SPINAL FUSION: ICD-10-CM

## 2018-11-08 DIAGNOSIS — Z79.01 CHRONIC ANTICOAGULATION: ICD-10-CM

## 2018-11-08 NOTE — PATIENT INSTRUCTIONS
Sacroiliac Pain: Exercises Your Care Instructions Here are some examples of typical rehabilitation exercises for your condition. Start each exercise slowly. Ease off the exercise if you start to have pain. Your doctor or physical therapist will tell you when you can start these exercises and which ones will work best for you. How to do the exercises Knee-to-chest stretch 1. Do not do the knee-to-chest exercise if it causes or increases back or leg pain. 2. Lie on your back with your knees bent and your feet flat on the floor. You can put a small pillow under your head and neck if it is more comfortable. 3. Grasp your hands under one knee and bring the knee to your chest, keeping the other foot flat on the floor. 4. Keep your lower back pressed to the floor. Hold for at least 15 to 30 seconds. 5. Relax and lower the knee to the starting position. Repeat with the other leg. 6. Repeat 2 to 4 times with each leg. 7. To get more stretch, keep your other leg flat on the floor while pulling your knee to your chest. 
 
Bridging 1. Lie on your back with both knees bent. Your knees should be bent about 90 degrees. 2. Tighten your belly muscles by pulling in your belly button toward your spine. Then push your feet into the floor, squeeze your buttocks, and lift your hips off the floor until your shoulders, hips, and knees are all in a straight line. 3. Hold for about 6 seconds as you continue to breathe normally, and then slowly lower your hips back down to the floor and rest for up to 10 seconds. 4. Repeat 8 to 12 times. Hip extension 1. Get down on your hands and knees on the floor. 2. Keeping your back and neck straight, lift one leg straight out behind you. When you lift your leg, keep your hips level. Don't let your back twist, and don't let your hip drop toward the floor. 3. Hold for 6 seconds. Repeat 8 to 12 times with each leg. 4. If you feel steady and strong when you do this exercise, you can make it more difficult. To do this, when you lift your leg, also lift the opposite arm straight out in front of you. For example, lift the left leg and the right arm at the same time. (This is sometimes called the \"bird dog exercise. \") Hold for 6 seconds, and repeat 8 to 12 times on each side. Clamshell 1. Lie on your side with a pillow under your head. Keep your feet and knees together and your knees bent. 2. Raise your top knee, but keep your feet together. Do not let your hips roll back. Your legs should open up like a clamshell. 3. Hold for 6 seconds. 4. Slowly lower your knee back down. Rest for 10 seconds. 5. Repeat 8 to 12 times. 6. Switch to your other side and repeat steps 1 through 5. Hamstring wall stretch 1. Lie on your back in a doorway, with one leg through the open door. 2. Slide your affected leg up the wall to straighten your knee. You should feel a gentle stretch down the back of your leg. 1. Do not arch your back. 2. Do not bend either knee. 3. Keep one heel touching the floor and the other heel touching the wall. Do not point your toes. 3. Hold the stretch for at least 1 minute to begin. Then try to lengthen the time you hold the stretch to as long as 6 minutes. 4. Switch legs, and repeat steps 1 through 3. 
5. Repeat 2 to 4 times. 6. If you do not have a place to do this exercise in a doorway, there is another way to do it: 
7. Lie on your back, and bend one knee. 8. Loop a towel under the ball and toes of that foot, and hold the ends of the towel in your hands. 9. Straighten your knee, and slowly pull back on the towel. You should feel a gentle stretch down the back of your leg. 10. Switch legs, and repeat steps 1 through 3. 
11. Repeat 2 to 4 times. Lower abdominal strengthening 1. Lie on your back with your knees bent and your feet flat on the floor. 2. Tighten your belly muscles by pulling your belly button in toward your spine. 3. Lift one foot off the floor and bring your knee toward your chest, so that your knee is straight above your hip and your leg is bent like the letter \"L. \" 
4. Lift the other knee up to the same position. 5. Lower one leg at a time to the starting position. 6. Keep alternating legs until you have lifted each leg 8 to 12 times. 7. Be sure to keep your belly muscles tight and your back still as you are moving your legs. Be sure to breathe normally. Piriformis stretch 1. Lie on your back with your legs straight. 2. Lift your affected leg, and bend your knee. With your opposite hand, reach across your body, and then gently pull your knee toward your opposite shoulder. 3. Hold the stretch for 15 to 30 seconds. 4. Switch legs and repeat steps 1 through 3. 
5. Repeat 2 to 4 times. Follow-up care is a key part of your treatment and safety. Be sure to make and go to all appointments, and call your doctor if you are having problems. It's also a good idea to know your test results and keep a list of the medicines you take. Where can you learn more? Go to http://geovanna-tish.info/. Enter N206 in the search box to learn more about \"Sacroiliac Pain: Exercises. \" Current as of: November 29, 2017 Content Version: 11.8 © 3988-9501 [employer]. Care instructions adapted under license by [employer] (which disclaims liability or warranty for this information). If you have questions about a medical condition or this instruction, always ask your healthcare professional. [address] any warranty or liability for your use of this information. Low Back Arthritis: Exercises Your Care Instructions Here are some examples of typical rehabilitation exercises for your condition. Start each exercise slowly. Ease off the exercise if you start to have pain. Your doctor or physical therapist will tell you when you can start these exercises and which ones will work best for you. When you are not being active, find a comfortable position for rest. Some people are comfortable on the floor or a medium-firm bed with a small pillow under their head and another under their knees. Some people prefer to lie on their side with a pillow between their knees. Don't stay in one position for too long. Take short walks (10 to 20 minutes) every 2 to 3 hours. Avoid slopes, hills, and stairs until you feel better. Walk only distances you can manage without pain, especially leg pain. How to do the exercises Pelvic tilt 8. Lie on your back with your knees bent. 9. \"Brace\" your stomachtighten your muscles by pulling in and imagining your belly button moving toward your spine. 10. Press your lower back into the floor. You should feel your hips and pelvis rock back. 11. Hold for 6 seconds while breathing smoothly. 12. Relax and allow your pelvis and hips to rock forward. 13. Repeat 8 to 12 times. Back stretches 5. Get down on your hands and knees on the floor. 6. Relax your head and allow it to droop. Round your back up toward the ceiling until you feel a nice stretch in your upper, middle, and lower back. Hold this stretch for as long as it feels comfortable, or about 15 to 30 seconds. 7. Return to the starting position with a flat back while you are on your hands and knees. 8. Let your back sway by pressing your stomach toward the floor. Lift your buttocks toward the ceiling. 9. Hold this position for 15 to 30 seconds. 10. Repeat 2 to 4 times. Follow-up care is a key part of your treatment and safety. Be sure to make and go to all appointments, and call your doctor if you are having problems. It's also a good idea to know your test results and keep a list of the medicines you take. Where can you learn more? Go to http://geovanna-tish.info/. Enter Y731 in the search box to learn more about \"Low Back Arthritis: Exercises. \" Current as of: November 29, 2017 Content Version: 11.8 © 1167-3408 [employer], [employer]. Care instructions adapted under license by [employer] (which disclaims liability or warranty for this information). If you have questions about a medical condition or this instruction, always ask your healthcare professional. [place] [address] any warranty or liability for your use of this information.

## 2018-11-08 NOTE — PROGRESS NOTES
[address] 
[address] [place], [facility] [address] Phone: [phone] Fax: [phone] Pt's YOB: 1945 ASSESSMENT Diagnoses and all orders for this visit: 
 
1. Fall, initial encounter 2. Lumbar pain -     AMB POC XRAY, SPINE, LUMBOSACRAL; 4+ 
 
3. Sacroiliac joint dysfunction 
-     SCHEDULE SURGERY 4. Lumbar facet arthropathy 5. S/P lumbar spinal fusion 6. Chronic anticoagulation IMPRESSION AND PLAN: 
Ganga Menard is a 81 y.o. female with history of lumbar pain. She had a right shoulder replacement on 08/13/2018 at [facility] and is still in physical therapy. Pt fell in the beginning of 05/2018 and notes pressure in the lower back since the fall. She reports relief when using Flector patches but admits that they have note been as effective since the fall. She is on Eliquis and take prednisone 4 mg for polymyalgia rheumatica. 1) Pt was given information on lumbar arthritis exercises. 2) She is not a candidate for NSAID's due to chronic anticoagulation with Eliquis. 3) Pt was scheduled for a bilateral sacroiliac joint injection. 4) She will continue using Flector patches as prescribed and does not need a refill at this time. 5) Ms. Constance Carreno has a reminder for a \"due or due soon\" health maintenance. I have asked that she contact her primary care provider, Ghazala Phelan MD, for follow-up on this health maintenance. 6)  demonstrated consistency with prescribing. 7) Pt will follow-up in 1 month or sooner if needed. HISTORY OF PRESENT ILLNESS: 
Ganga Menard is a 81 y.o. female with history of lumbar pain. She had a right shoulder replacement on 08/13/2018 at [facility] and is still in physical therapy.  Pt reports that she had to discontinue therapy sessions for about 1 week when she had muscle cramping but she reports that she is doing well with sessions at this time. She notes that the range of motion of her right shoulder is still somewhat limited but she is now able to sleep in her right side again. Pt fell in the beginning of 05/2018 when she tripped over her neighbor's dog. She did not go the ER but notes extensive bruising on her right side after the fall. Pt notes pressure in the lower back since the fall and reports that she often has to lean forward when walking. Of note she had a previous L2-5 fusion. Pt notes that she can only walk short distances due to pressure and pain in the lower back. She denies any pain or weakness radiating down the legs. She notes that she tried physical therapy prior to her lumbar surgery. Pt reports relief when using Flector patches but admits that they have note been as effective since her fall in 05/2018. She was diagnosed with atrial fibrillation earlier this year and is on Eliquis. Pt takes prednisone 4 mg and has a history of polymyalgia rheumatica. She reports significant relief with steroid injections in the past. Pt at this time desires to proceed with steroid injections. Pain Scale: 3/10 PCP: Henok Espinoza MD 
  
Past Medical History:  
Diagnosis Date  Arthritis  Hypercholesterolemia  Hypertension  Ill-defined condition   
 osteopenia  Migraine  PMR (polymyalgia rheumatica) ([facility])  Thyroid disease Social History Socioeconomic History  Marital status:  Spouse name: Not on file  Number of children: Not on file  Years of education: Not on file  Highest education level: Not on file Social Needs  Financial resource strain: Not on file  Food insecurity - worry: Not on file  Food insecurity - inability: Not on file  Transportation needs - medical: Not on file  Transportation needs - non-medical: Not on file Occupational History  Not on file Tobacco Use  Smoking status: Former Smoker Packs/day: 1.00 Years: 5.00 Pack years: 5.00 Last attempt to quit: 1970 Years since quittin.8  Smokeless tobacco: Never Used Substance and Sexual Activity  Alcohol use: Yes Alcohol/week: 0.5 oz Types: 1 Glasses of wine per week Comment: 4 nights a week  Drug use: No  
 Sexual activity: Not on file Comment: Hysterectomy Other Topics Concern  Not on file Social History Narrative  Not on file Current Outpatient Medications Medication Sig Dispense Refill  predniSONE (DELTASONE) 1 mg tablet Take 3 mg by mouth daily.  diclofenac (FLECTOR) 1.3 % pt12 1 Patch by TransDERmal route every twelve (12) hours every twelve (12) hours. Apply to left hip. Indications: PMR, SI Joint dysfunction 60 Patch 5  
 alendronate (FOSAMAX) 70 mg tablet Take 70 mg by mouth every seven (7) days.  DULoxetine (CYMBALTA) 60 mg capsule Take 60 mg by mouth daily.  liothyronine (CYTOMEL) 5 mcg tablet Take 5 mcg by mouth daily.  metaxalone (SKELAXIN) 800 mg tablet 1 po bid prn spasm  Indications: MUSCLE SPASM 60 Tab 2  
 acetaminophen (TYLENOL EXTRA STRENGTH) 500 mg tablet Take  by mouth every six (6) hours as needed for Pain.  metoprolol succinate (TOPROL-XL) 25 mg XL tablet Take 25 mg by mouth daily. 0  
 LEVOTHYROXINE SODIUM (SYNTHROID PO) Take 75 mcg by mouth daily. 6 days a week  rosuvastatin (CRESTOR) 20 mg tablet Take 20 mg by mouth nightly.  cycloSPORINE (RESTASIS) 0.05 % ophthalmic emulsion Administer 1 Drop to both eyes two (2) times a day.  aspirin 81 mg chewable tablet Take 81 mg by mouth daily.  benazepril (LOTENSIN) 40 mg tablet Take 40 mg by mouth daily.  ALPRAZolam (XANAX) 0.5 mg tablet Take 0.5 mg by mouth. Allergies Allergen Reactions  Levaquin [Levofloxacin] Nausea and Vomiting REVIEW OF SYSTEMS Constitutional: Negative for fever, chills, or weight change. Respiratory: Negative for cough or shortness of breath. Cardiovascular: Negative for chest pain or palpitations. Gastrointestinal: Negative for acid reflux, change in bowel habits, or constipation. Genitourinary: Negative for dysuria and flank pain. Musculoskeletal: Positive for lumbar pain. Skin: Negative for rash. Neurological: Negative for headaches, dizziness, or numbness. Endo/Heme/Allergies: Negative for increased bruising. Psychiatric/Behavioral: Negative for difficulty with sleep. PHYSICAL EXAMINATION Visit Vitals /58 Pulse 76 Ht 5' 3\" (1.6 m) Wt 182 lb (82.6 kg) SpO2 97% BMI 32.24 kg/m² Constitutional: Awake, alert, and in no acute distress. Neurological: 1+ symmetrical DTRs in the upper extremities. 1+ symmetrical DTRs in the lower extremities. Sensation to light touch is intact. Negative Napoleon's sign bilaterally. Skin: warm, dry, and intact. Musculoskeletal: Tenderness to palpation in the lower lumbar region and over the sacroiliac joints. Moderate pain with extension and axial loading. No pain with internal or external rotation of her hips. Negative straight leg raise bilaterally. Hip Flex  Quads Hamstrings Ankle DF EHL Ankle PF Right +4/5 +4/5 +4/5 +4/5 +4/5 +4/5 Left +4/5 +4/5 +4/5 +4/5 +4/5 +4/5 IMAGING: 
 
Lumbar spine 4V x-rays from 11/08/2018 were personally reviewed with the patient and demonstrated: L2-5 fusion and hardware is intact. Severe degenerative disc L5-S1 and degenerative facets. No instability. Mild calcification in the aorta. Lumbar Spine MRI from 09/22/2016 was personally reviewed with the Pt and demonstrated: FINDINGS: 
Scoliosis of the lumbar spine convex to the left. Grade 1 retrolisthesis L2 on 
L3 and L3 on L4. Grade 1 anterior listhesis of L4 on L5. Degenerative discogenic 
disease at all levels of the lumbar spine most prominent at L2-L3 where there is moderate to severe disc space narrowing. Vertebral body heights are maintained. Marrow signal is within normal limits The conus medullaris terminates at 
T12-L1. No abnormal enhancement of the thecal sac on postcontrast images. Susceptibility artifact from posterior fusion L2-L5 somewhat limits evaluation 
and causes distortion. 2.4 x 0.6 x 0.6 cm fluid collection at the right 
laminotomy site at L3-L4. 6.5 cm in length by 1.6 x 1.3 cm lobular T2 
hyperintense collection in the left posterior paraspinal soft tissues. There may 
be additional small collection posterior to the left spinal canal at L3-L4. Diffuse edema and atrophy of the paraspinal musculature. 
   
L1/2: The spinal canal and neural foramina are widely patent. 
   
L2/3: Diffuse disc bulge and grade 1 retrolisthesis. Mild foraminal stenosis. Mild facet and ligamentous hypertrophy. Central canal is patent. 
   
L3/4: Diffuse disc bulge and slight retrolisthesis. Central canal and neural 
foramen are patent. 
   
L4/5: Diffuse disc bulge. Central and neural foramen are patent. 
   
L5/S1: Diffuse disc bulge. Mild/moderate facet arthropathy. Central canal is 
patent. Mild left foraminal stenosis. 
   
IMPRESSION:  
Posterior fusion L2-L5. Central canal is patent at these levels. 
   
Fluid collection at the right hemilaminotomy site and in the adjacent posterior 
paraspinal musculature most likely representing postoperative fluid 
collection/seroma, abscess is unlikely. There may be additional small fluid 
posterior to the left the spinal canal at L3-L4. 
   
Mild degenerative changes at L5-S1. Written by Annabelle Dixon, as dictated by Carley Gambino MD. 
I, Dr. Carley Gambino confirm that all documentation is accurate.

## 2018-11-28 ENCOUNTER — HOSPITAL ENCOUNTER (OUTPATIENT)
Age: 73
Setting detail: OUTPATIENT SURGERY
Discharge: HOME | End: 2018-11-28
Attending: PHYSICAL MEDICINE & REHABILITATION | Admitting: PHYSICAL MEDICINE & REHABILITATION
Payer: MEDICARE

## 2018-11-28 ENCOUNTER — APPOINTMENT (OUTPATIENT)
Dept: GENERAL RADIOLOGY | Age: 73
End: 2018-11-28
Attending: PHYSICAL MEDICINE & REHABILITATION
Payer: MEDICARE

## 2018-11-28 VITALS
BODY MASS INDEX: 32.25 KG/M2 | TEMPERATURE: 97.9 F | HEIGHT: 63 IN | WEIGHT: 182 LBS | SYSTOLIC BLOOD PRESSURE: 149 MMHG | HEART RATE: 76 BPM | OXYGEN SATURATION: 95 % | RESPIRATION RATE: 16 BRPM | DIASTOLIC BLOOD PRESSURE: 72 MMHG

## 2018-11-28 PROCEDURE — 77030003676 HC NDL SPN MPRI -A: Performed by: PHYSICAL MEDICINE & REHABILITATION

## 2018-11-28 PROCEDURE — 74011250636 HC RX REV CODE- 250/636: Performed by: PHYSICAL MEDICINE & REHABILITATION

## 2018-11-28 PROCEDURE — 77030039433 HC TY MYLEOGRAM BD -B: Performed by: PHYSICAL MEDICINE & REHABILITATION

## 2018-11-28 PROCEDURE — 74011636320 HC RX REV CODE- 636/320: Performed by: PHYSICAL MEDICINE & REHABILITATION

## 2018-11-28 PROCEDURE — 76010000009 HC PAIN MGT 0 TO 30 MIN PROC: Performed by: PHYSICAL MEDICINE & REHABILITATION

## 2018-11-28 RX ORDER — LIDOCAINE HYDROCHLORIDE 10 MG/ML
INJECTION, SOLUTION EPIDURAL; INFILTRATION; INTRACAUDAL; PERINEURAL AS NEEDED
Status: DISCONTINUED | OUTPATIENT
Start: 2018-11-28 | End: 2018-11-28 | Stop reason: HOSPADM

## 2018-11-28 RX ORDER — DEXAMETHASONE SODIUM PHOSPHATE 100 MG/10ML
INJECTION INTRAMUSCULAR; INTRAVENOUS AS NEEDED
Status: DISCONTINUED | OUTPATIENT
Start: 2018-11-28 | End: 2018-11-28 | Stop reason: HOSPADM

## 2018-11-28 RX ORDER — DIAZEPAM 5 MG/1
5-20 TABLET ORAL ONCE
Status: DISCONTINUED | OUTPATIENT
Start: 2018-11-28 | End: 2018-11-28 | Stop reason: HOSPADM

## 2018-11-28 NOTE — H&P
Date of Surgery Update: 
Clovis Ellington was seen and examined. History and physical has been reviewed. The patient has been examined. There have been no significant clinical changes since the last office visit.  
 
 
Signed By: Sunita Ordonez MD   
 November 28, 2018 12:41 PM

## 2018-11-28 NOTE — DISCHARGE INSTRUCTIONS
[facility]   ([facility])  Dr. Mimi Patel, Dr. Amaris Cunningham, Dr. Zi Padgett not drive a car, operate heavy machinery or dangerous equipment for 24 hours. * Activity as tolerated; rest for the remainder of the day. * Resume pre-block medications including those for your family doctor. * Do not drink alcoholic beverages for 24 hours. Alcohol and the medications you have received may interact and cause an adverse reaction. * You may feel better this evening and worse tomorrow, as the numbing medications wears off and the steroid has yet to begin to work. After 48 hrs the steroid should begin to release bringing you relief. * You may shower this evening and remove any bandages. * Avoid hot tubs and heating pads for 24 hours. You may use cold packs on the procedure site as tolerated for the first 24 hours. * If a headache develops, drink plenty of fluids and rest.  Take over the counter medications for headache if needed. If the headache continues longer than 24 hours, call MD at the [address] [place] [address]. [phone]    * Continue taking pain medications as needed. * You may resume your regular diet if tolerated. Otherwise, start with sips of water and advance slowly. * If Diabetic: check your blood sugar three times a day for the next 3 days. If your sugar is greater than 300 call your family doctor. If your sugar is greater than 400, have someone transport you to the nearest Emergency Room. * If you experience any of the following problems, Please Call the [address] [place] [address] at [phone].         * Shortness of Breath    * Fever of 101 or higher    * Nausea / Vomiting    * Severe Headache    * Weakness or numbness in arms or legs that is not      resolving    * Prolonged increase in pain greater than 4 days      DISCHARGE SUMMARY from Nurse      PATIENT INSTRUCTIONS:    After oral sedation, for 24 hours or while taking prescription Narcotics:  · Limit your activities  · Do not drive and operate hazardous machinery  · Do not make important personal or business decisions  · Do  not drink alcoholic beverages  · If you have not urinated within 8 hours after discharge, please contact your surgeon on call. Report the following to your surgeon:  · Excessive pain, swelling, redness or odor of or around the surgical area  · Temperature over 101  · Nausea and vomiting lasting longer than 4 hours or if unable to take medications  · Any signs of decreased circulation or nerve impairment to extremity: change in color, persistent  numbness, tingling, coldness or increase pain  · Any questions            What to do at Home:  Recommended activity: Activity as tolerated, NO DRIVING FOR 12 Hours post injection          *  Please give a list of your current medications to your Primary Care Provider. *  Please update this list whenever your medications are discontinued, doses are      changed, or new medications (including over-the-counter products) are added. *  Please carry medication information at all times in case of emergency situations. These are general instructions for a healthy lifestyle:    No smoking/ No tobacco products/ Avoid exposure to second hand smoke    Surgeon General's Warning:  Quitting smoking now greatly reduces serious risk to your health. Obesity, smoking, and sedentary lifestyle greatly increases your risk for illness    A healthy diet, regular physical exercise & weight monitoring are important for maintaining a healthy lifestyle    You may be retaining fluid if you have a history of heart failure or if you experience any of the following symptoms:  Weight gain of 3 pounds or more overnight or 5 pounds in a week, increased swelling in our hands or feet or shortness of breath while lying flat in bed.   Please call your doctor as soon as you notice any of these symptoms; do not wait until your next office visit. Recognize signs and symptoms of STROKE:    F-face looks uneven    A-arms unable to move or move unevenly    S-speech slurred or non-existent    T-time-call 911 as soon as signs and symptoms begin-DO NOT go       Back to bed or wait to see if you get better-TIME IS BRAIN.

## 2018-11-28 NOTE — PERIOP NOTES
Patient tolerated procedure well. No complications noted. VSS. No redness, swelling, or bleeding from injection site. Dressing dry and intact. Armband removed and shredded. Patient ambulated to main entrance alive and well. Discharged in no distress.

## 2019-01-03 ENCOUNTER — OFFICE VISIT (OUTPATIENT)
Dept: ORTHOPEDIC SURGERY | Age: 74
End: 2019-01-03

## 2019-01-03 VITALS
DIASTOLIC BLOOD PRESSURE: 59 MMHG | BODY MASS INDEX: 33.95 KG/M2 | SYSTOLIC BLOOD PRESSURE: 135 MMHG | OXYGEN SATURATION: 96 % | HEART RATE: 64 BPM | WEIGHT: 191.6 LBS | TEMPERATURE: 96.5 F | HEIGHT: 63 IN | RESPIRATION RATE: 16 BRPM

## 2019-01-03 DIAGNOSIS — M47.816 LUMBAR FACET ARTHROPATHY: ICD-10-CM

## 2019-01-03 DIAGNOSIS — Z98.1 S/P LUMBAR SPINAL FUSION: ICD-10-CM

## 2019-01-03 DIAGNOSIS — M53.3 SACROILIAC JOINT DYSFUNCTION: Primary | ICD-10-CM

## 2019-01-03 DIAGNOSIS — M35.3 PMR (POLYMYALGIA RHEUMATICA) (HCC): ICD-10-CM

## 2019-01-03 DIAGNOSIS — Z79.01 CHRONIC ANTICOAGULATION: ICD-10-CM

## 2019-01-03 NOTE — PATIENT INSTRUCTIONS
Low Back Arthritis: Exercises Your Care Instructions Here are some examples of typical rehabilitation exercises for your condition. Start each exercise slowly. Ease off the exercise if you start to have pain. Your doctor or physical therapist will tell you when you can start these exercises and which ones will work best for you. When you are not being active, find a comfortable position for rest. Some people are comfortable on the floor or a medium-firm bed with a small pillow under their head and another under their knees. Some people prefer to lie on their side with a pillow between their knees. Don't stay in one position for too long. Take short walks (10 to 20 minutes) every 2 to 3 hours. Avoid slopes, hills, and stairs until you feel better. Walk only distances you can manage without pain, especially leg pain. How to do the exercises Pelvic tilt 1. Lie on your back with your knees bent. 2. \"Brace\" your stomachtighten your muscles by pulling in and imagining your belly button moving toward your spine. 3. Press your lower back into the floor. You should feel your hips and pelvis rock back. 4. Hold for 6 seconds while breathing smoothly. 5. Relax and allow your pelvis and hips to rock forward. 6. Repeat 8 to 12 times. Back stretches 1. Get down on your hands and knees on the floor. 2. Relax your head and allow it to droop. Round your back up toward the ceiling until you feel a nice stretch in your upper, middle, and lower back. Hold this stretch for as long as it feels comfortable, or about 15 to 30 seconds. 3. Return to the starting position with a flat back while you are on your hands and knees. 4. Let your back sway by pressing your stomach toward the floor. Lift your buttocks toward the ceiling. 5. Hold this position for 15 to 30 seconds. 6. Repeat 2 to 4 times. Follow-up care is a key part of your treatment and safety.  Be sure to make and go to all appointments, and call your doctor if you are having problems. It's also a good idea to know your test results and keep a list of the medicines you take. Where can you learn more? Go to http://geovanna-tish.info/. Enter B527 in the search box to learn more about \"Low Back Arthritis: Exercises. \" Current as of: November 29, 2017 Content Version: 11.8 © 7656-5892 [employer]. Care instructions adapted under license by [employer] (which disclaims liability or warranty for this information). If you have questions about a medical condition or this instruction, always ask your healthcare professional. [address] any warranty or liability for your use of this information. Sacroiliac Pain: Exercises Your Care Instructions Here are some examples of typical rehabilitation exercises for your condition. Start each exercise slowly. Ease off the exercise if you start to have pain. Your doctor or physical therapist will tell you when you can start these exercises and which ones will work best for you. How to do the exercises Knee-to-chest stretch 7. Do not do the knee-to-chest exercise if it causes or increases back or leg pain. 8. Lie on your back with your knees bent and your feet flat on the floor. You can put a small pillow under your head and neck if it is more comfortable. 9. Grasp your hands under one knee and bring the knee to your chest, keeping the other foot flat on the floor. 10. Keep your lower back pressed to the floor. Hold for at least 15 to 30 seconds. 11. Relax and lower the knee to the starting position. Repeat with the other leg. 12. Repeat 2 to 4 times with each leg. 13. To get more stretch, keep your other leg flat on the floor while pulling your knee to your chest. 
 
Bridging 7. Lie on your back with both knees bent. Your knees should be bent about 90 degrees. 8. Tighten your belly muscles by pulling in your belly button toward your spine. Then push your feet into the floor, squeeze your buttocks, and lift your hips off the floor until your shoulders, hips, and knees are all in a straight line. 9. Hold for about 6 seconds as you continue to breathe normally, and then slowly lower your hips back down to the floor and rest for up to 10 seconds. 10. Repeat 8 to 12 times. Hip extension 1. Get down on your hands and knees on the floor. 2. Keeping your back and neck straight, lift one leg straight out behind you. When you lift your leg, keep your hips level. Don't let your back twist, and don't let your hip drop toward the floor. 3. Hold for 6 seconds. Repeat 8 to 12 times with each leg. 4. If you feel steady and strong when you do this exercise, you can make it more difficult. To do this, when you lift your leg, also lift the opposite arm straight out in front of you. For example, lift the left leg and the right arm at the same time. (This is sometimes called the \"bird dog exercise. \") Hold for 6 seconds, and repeat 8 to 12 times on each side. Clamshell 1. Lie on your side with a pillow under your head. Keep your feet and knees together and your knees bent. 2. Raise your top knee, but keep your feet together. Do not let your hips roll back. Your legs should open up like a clamshell. 3. Hold for 6 seconds. 4. Slowly lower your knee back down. Rest for 10 seconds. 5. Repeat 8 to 12 times. 6. Switch to your other side and repeat steps 1 through 5. Hamstring wall stretch 1. Lie on your back in a doorway, with one leg through the open door. 2. Slide your affected leg up the wall to straighten your knee. You should feel a gentle stretch down the back of your leg. 1. Do not arch your back. 2. Do not bend either knee. 3. Keep one heel touching the floor and the other heel touching the wall. Do not point your toes. 3. Hold the stretch for at least 1 minute to begin. Then try to lengthen the time you hold the stretch to as long as 6 minutes. 4. Switch legs, and repeat steps 1 through 3. 
5. Repeat 2 to 4 times. 6. If you do not have a place to do this exercise in a doorway, there is another way to do it: 
7. Lie on your back, and bend one knee. 8. Loop a towel under the ball and toes of that foot, and hold the ends of the towel in your hands. 9. Straighten your knee, and slowly pull back on the towel. You should feel a gentle stretch down the back of your leg. 10. Switch legs, and repeat steps 1 through 3. 
11. Repeat 2 to 4 times. Lower abdominal strengthening 1. Lie on your back with your knees bent and your feet flat on the floor. 2. Tighten your belly muscles by pulling your belly button in toward your spine. 3. Lift one foot off the floor and bring your knee toward your chest, so that your knee is straight above your hip and your leg is bent like the letter \"L. \" 
4. Lift the other knee up to the same position. 5. Lower one leg at a time to the starting position. 6. Keep alternating legs until you have lifted each leg 8 to 12 times. 7. Be sure to keep your belly muscles tight and your back still as you are moving your legs. Be sure to breathe normally. Piriformis stretch 1. Lie on your back with your legs straight. 2. Lift your affected leg, and bend your knee. With your opposite hand, reach across your body, and then gently pull your knee toward your opposite shoulder. 3. Hold the stretch for 15 to 30 seconds. 4. Switch legs and repeat steps 1 through 3. 
5. Repeat 2 to 4 times. Follow-up care is a key part of your treatment and safety. Be sure to make and go to all appointments, and call your doctor if you are having problems. It's also a good idea to know your test results and keep a list of the medicines you take. Where can you learn more? Go to http://geovanna-tish.info/. Enter C041 in the search box to learn more about \"Sacroiliac Pain: Exercises. \" Current as of: November 29, 2017 Content Version: 11.8 © 2085-2501 [employer]wise, [employer]. Care instructions adapted under license by [employer] (which disclaims liability or warranty for this information). If you have questions about a medical condition or this instruction, always ask your healthcare professional. [address] any warranty or liability for your use of this information.

## 2019-01-03 NOTE — PROGRESS NOTES
[address] 
[address] [place] Phone: [phone] Fax: [phone] Pt's YOB: 1945 ASSESSMENT Diagnoses and all orders for this visit: 
 
1. Sacroiliac joint dysfunction 
-     SCHEDULE SURGERY 2. Lumbar facet arthropathy 3. S/P lumbar spinal fusion 4. Chronic anticoagulation 5. PMR (polymyalgia rheumatica) ([facility]) IMPRESSION AND PLAN: 
Gadiel Arita is a 72 y.o. female with history of lumbar pain. She had a bilateral sacroiliac joint injection on 11/28/2018 with significant improvement on the right. Pt notes pain in the left sacroiliac joint at this time. 1) Pt was given information on lumbar arthritis and sacroiliac exercises. 2) She is not a candidate for NSAID's due to chronic anticoagulation with Eliquis. 3) Pt was scheduled for a left sacroiliac joint injection. 4) Ms. Laurent Becerril has a reminder for a \"due or due soon\" health maintenance. I have asked that she contact her primary care provider, Pj Carrion MD, for follow-up on this health maintenance. 5)  demonstrated consistency with prescribing. 6) Pt will follow-up in 1 month or sooner if needed. HISTORY OF PRESENT ILLNESS: 
Gadiel Arita is a 72 y.o. female with history of lumbar pain and presents to the office today for follow up. She had a bilateral sacroiliac joint injection on 11/28/2018 with significant improvement on the right. Pt notes pain in the left sacroiliac joint at this time. She states that she performed some lifting to help with her 80 y.o. aunt when she visited [place], [place] over the holidays and thinks this may have exacerbated her pain. She takes prednisone 3 mg for polymyalgia rheumatica. Pt notes that she received a CPAP machine in 07/2018. Pt notes that she was diagnosed with PMR about 8 years ago. Pt at this time desires to proceed with steroid injections. Pain Scale: 4/10 PCP: Yadira Alcala MD 
  
Past Medical History:  
Diagnosis Date  Arthritis  Hypercholesterolemia  Hypertension  Ill-defined condition   
 osteopenia  Migraine  PMR (polymyalgia rheumatica) ([facility])  Thyroid disease Social History Socioeconomic History  Marital status:  Spouse name: Not on file  Number of children: Not on file  Years of education: Not on file  Highest education level: Not on file Social Needs  Financial resource strain: Not on file  Food insecurity - worry: Not on file  Food insecurity - inability: Not on file  Transportation needs - medical: Not on file  Transportation needs - non-medical: Not on file Occupational History  Not on file Tobacco Use  Smoking status: Former Smoker Packs/day: 1.00 Years: 5.00 Pack years: 5.00 Last attempt to quit: 1970 Years since quittin.0  Smokeless tobacco: Never Used Substance and Sexual Activity  Alcohol use: Yes Alcohol/week: 0.5 oz Types: 1 Glasses of wine per week Comment: 4 nights a week  Drug use: No  
 Sexual activity: Not on file Comment: Hysterectomy Other Topics Concern  Not on file Social History Narrative  Not on file Current Outpatient Medications Medication Sig Dispense Refill  apixaban (ELIQUIS) 5 mg tablet Take 5 mg by mouth two (2) times a day.  flecainide acetate (FLECAINIDE PO) Take  by mouth.  predniSONE (DELTASONE) 1 mg tablet Take 3 mg by mouth daily.  diclofenac (FLECTOR) 1.3 % pt12 1 Patch by TransDERmal route every twelve (12) hours every twelve (12) hours. Apply to left hip. Indications: PMR, SI Joint dysfunction 60 Patch 5  
 alendronate (FOSAMAX) 70 mg tablet Take 70 mg by mouth every seven (7) days.  DULoxetine (CYMBALTA) 60 mg capsule Take 60 mg by mouth daily.  liothyronine (CYTOMEL) 5 mcg tablet Take 5 mcg by mouth daily.  acetaminophen (TYLENOL EXTRA STRENGTH) 500 mg tablet Take  by mouth every six (6) hours as needed for Pain.  metoprolol succinate (TOPROL-XL) 25 mg XL tablet Take 25 mg by mouth daily. Take 1 tab po q am, take 1/2 tab po q pm  0  
 LEVOTHYROXINE SODIUM (SYNTHROID PO) Take 75 mcg by mouth daily. 6 days a week  rosuvastatin (CRESTOR) 20 mg tablet Take 20 mg by mouth nightly.  cycloSPORINE (RESTASIS) 0.05 % ophthalmic emulsion Administer 1 Drop to both eyes two (2) times a day.  aspirin 81 mg chewable tablet Take 81 mg by mouth daily.  benazepril (LOTENSIN) 40 mg tablet Take 40 mg by mouth daily.  ALPRAZolam (XANAX) 0.5 mg tablet Take 0.5 mg by mouth.  metaxalone (SKELAXIN) 800 mg tablet 1 po bid prn spasm  Indications: MUSCLE SPASM 60 Tab 2 Allergies Allergen Reactions  Levaquin [Levofloxacin] Nausea and Vomiting REVIEW OF SYSTEMS Constitutional: Negative for fever, chills, or weight change. Respiratory: Negative for cough or shortness of breath. Cardiovascular: Negative for chest pain or palpitations. Gastrointestinal: Negative for acid reflux, change in bowel habits, or constipation. Genitourinary: Negative for dysuria and flank pain. Musculoskeletal: Positive for lumbar pain. Skin: Negative for rash. Neurological: Negative for headaches, dizziness, or numbness. Endo/Heme/Allergies: Negative for increased bruising. Psychiatric/Behavioral: Negative for difficulty with sleep. PHYSICAL EXAMINATION Visit Vitals /59 Pulse 64 Temp 96.5 °F (35.8 °C) (Oral) Resp 16 Ht 5' 3\" (1.6 m) Wt 191 lb 9.6 oz (86.9 kg) SpO2 96% BMI 33.94 kg/m² Constitutional: Awake, alert, and in no acute distress. Neurological: 1+ symmetrical DTRs in the lower extremities. Sensation to light touch is intact. Skin: warm, dry, and intact.   
Musculoskeletal: Tenderness to palpation in the lower lumbar region and over the left sacroiliac joint. Moderate pain with extension and axial loading. No pain with internal or external rotation of her hips. Negative straight leg raise bilaterally. Hip Flex  Quads Hamstrings Ankle DF EHL Ankle PF Right +4/5 +4/5 +4/5 +4/5 +4/5 +4/5 Left +4/5 +4/5 +4/5 +4/5 +4/5 +4/5 IMAGING: 
 
Lumbar spine 4V x-rays from 11/08/2018 were personally reviewed with the patient and demonstrated: L2-5 fusion and hardware is intact. Severe degenerative disc L5-S1 and degenerative facets. No instability. Mild calcification in the aorta.  
  
Lumbar Spine MRI from 09/22/2016 was personally reviewed with the Pt and demonstrated: FINDINGS: 
Scoliosis of the lumbar spine convex to the left. Grade 1 retrolisthesis L2 on 
L3 and L3 on L4. Grade 1 anterior listhesis of L4 on L5. Degenerative discogenic 
disease at all levels of the lumbar spine most prominent at L2-L3 where there is 
moderate to severe disc space narrowing. Vertebral body heights are maintained. Marrow signal is within normal limits The conus medullaris terminates at 
T12-L1. No abnormal enhancement of the thecal sac on postcontrast images. Susceptibility artifact from posterior fusion L2-L5 somewhat limits evaluation 
and causes distortion. 2.4 x 0.6 x 0.6 cm fluid collection at the right 
laminotomy site at L3-L4. 6.5 cm in length by 1.6 x 1.3 cm lobular T2 
hyperintense collection in the left posterior paraspinal soft tissues. There may 
be additional small collection posterior to the left spinal canal at L3-L4. Diffuse edema and atrophy of the paraspinal musculature. 
   
L1/2: The spinal canal and neural foramina are widely patent. 
   
L2/3: Diffuse disc bulge and grade 1 retrolisthesis. Mild foraminal stenosis. Mild facet and ligamentous hypertrophy. Central canal is patent. 
   
L3/4: Diffuse disc bulge and slight retrolisthesis.  Central canal and neural 
foramen are patent. 
   
 L4/5: Diffuse disc bulge. Central and neural foramen are patent. 
   
L5/S1: Diffuse disc bulge. Mild/moderate facet arthropathy. Central canal is 
patent. Mild left foraminal stenosis. 
   
IMPRESSION:  
Posterior fusion L2-L5. Central canal is patent at these levels. 
   
Fluid collection at the right hemilaminotomy site and in the adjacent posterior 
paraspinal musculature most likely representing postoperative fluid 
collection/seroma, abscess is unlikely. There may be additional small fluid 
posterior to the left the spinal canal at L3-L4. 
   
Mild degenerative changes at L5-S1. 
  
Written by Nitin Siddiqui, as dictated by Liza Calhoun MD. 
I, Dr. Liza Calhoun confirm that all documentation is accurate.

## 2019-01-11 ENCOUNTER — DOCUMENTATION ONLY (OUTPATIENT)
Dept: ORTHOPEDIC SURGERY | Age: 74
End: 2019-01-11

## 2019-01-16 ENCOUNTER — HOSPITAL ENCOUNTER (OUTPATIENT)
Age: 74
Setting detail: OUTPATIENT SURGERY
Discharge: HOME | End: 2019-01-16
Attending: PHYSICAL MEDICINE & REHABILITATION | Admitting: PHYSICAL MEDICINE & REHABILITATION
Payer: MEDICARE

## 2019-01-16 ENCOUNTER — APPOINTMENT (OUTPATIENT)
Dept: GENERAL RADIOLOGY | Age: 74
End: 2019-01-16
Attending: PHYSICAL MEDICINE & REHABILITATION
Payer: MEDICARE

## 2019-01-16 VITALS
BODY MASS INDEX: 33.84 KG/M2 | RESPIRATION RATE: 16 BRPM | HEIGHT: 63 IN | DIASTOLIC BLOOD PRESSURE: 55 MMHG | TEMPERATURE: 98 F | SYSTOLIC BLOOD PRESSURE: 140 MMHG | WEIGHT: 191 LBS | OXYGEN SATURATION: 97 % | HEART RATE: 76 BPM

## 2019-01-16 PROCEDURE — 74011250636 HC RX REV CODE- 250/636: Performed by: PHYSICAL MEDICINE & REHABILITATION

## 2019-01-16 PROCEDURE — 76010000009 HC PAIN MGT 0 TO 30 MIN PROC: Performed by: PHYSICAL MEDICINE & REHABILITATION

## 2019-01-16 PROCEDURE — 77030039433 HC TY MYLEOGRAM BD -B: Performed by: PHYSICAL MEDICINE & REHABILITATION

## 2019-01-16 PROCEDURE — 74011636320 HC RX REV CODE- 636/320: Performed by: PHYSICAL MEDICINE & REHABILITATION

## 2019-01-16 RX ORDER — LIDOCAINE HYDROCHLORIDE 10 MG/ML
INJECTION, SOLUTION EPIDURAL; INFILTRATION; INTRACAUDAL; PERINEURAL AS NEEDED
Status: DISCONTINUED | OUTPATIENT
Start: 2019-01-16 | End: 2019-01-16 | Stop reason: HOSPADM

## 2019-01-16 RX ORDER — DIAZEPAM 5 MG/1
5-20 TABLET ORAL ONCE
Status: DISCONTINUED | OUTPATIENT
Start: 2019-01-16 | End: 2019-01-16 | Stop reason: HOSPADM

## 2019-01-16 RX ORDER — DEXAMETHASONE SODIUM PHOSPHATE 100 MG/10ML
INJECTION INTRAMUSCULAR; INTRAVENOUS AS NEEDED
Status: DISCONTINUED | OUTPATIENT
Start: 2019-01-16 | End: 2019-01-16 | Stop reason: HOSPADM

## 2019-01-16 NOTE — DISCHARGE INSTRUCTIONS
[facility]   ([facility])  Dr. Prince Jimenez, Dr. Andree Freeman, Dr. Yoel Lester not drive a car, operate heavy machinery or dangerous equipment for 24 hours. * Activity as tolerated; rest for the remainder of the day. * Resume pre-block medications including those for your family doctor. * Do not drink alcoholic beverages for 24 hours. Alcohol and the medications you have received may interact and cause an adverse reaction. * You may feel better this evening and worse tomorrow, as the numbing medications wears off and the steroid has yet to begin to work. After 48 hrs the steroid should begin to release bringing you relief. * You may shower this evening and remove any bandages. * Avoid hot tubs and heating pads for 24 hours. You may use cold packs on the procedure site as tolerated for the first 24 hours. * If a headache develops, drink plenty of fluids and rest.  Take over the counter medications for headache if needed. If the headache continues longer than 24 hours, call MD at the [address] [place] [address]. [phone]    * Continue taking pain medications as needed. * You may resume your regular diet if tolerated. Otherwise, start with sips of water and advance slowly. * If Diabetic: check your blood sugar three times a day for the next 3 days. If your sugar is greater than 300 call your family doctor. If your sugar is greater than 400, have someone transport you to the nearest Emergency Room. * If you experience any of the following problems, Please Call the [address] [place] [address] at [phone].         * Shortness of Breath    * Fever of 101 or higher    * Nausea / Vomiting    * Severe Headache    * Weakness or numbness in arms or legs that is not      resolving    * Prolonged increase in pain greater than 4 days      DISCHARGE SUMMARY from Nurse      PATIENT INSTRUCTIONS:    After oral sedation, for 24 hours or while taking prescription Narcotics:  · Limit your activities  · Do not drive and operate hazardous machinery  · Do not make important personal or business decisions  · Do  not drink alcoholic beverages  · If you have not urinated within 8 hours after discharge, please contact your surgeon on call. Report the following to your surgeon:  · Excessive pain, swelling, redness or odor of or around the surgical area  · Temperature over 101  · Nausea and vomiting lasting longer than 4 hours or if unable to take medications  · Any signs of decreased circulation or nerve impairment to extremity: change in color, persistent  numbness, tingling, coldness or increase pain  · Any questions            What to do at Home:  Recommended activity: Activity as tolerated, NO DRIVING FOR 12 Hours post injection          *  Please give a list of your current medications to your Primary Care Provider. *  Please update this list whenever your medications are discontinued, doses are      changed, or new medications (including over-the-counter products) are added. *  Please carry medication information at all times in case of emergency situations. These are general instructions for a healthy lifestyle:    No smoking/ No tobacco products/ Avoid exposure to second hand smoke    Surgeon General's Warning:  Quitting smoking now greatly reduces serious risk to your health. Obesity, smoking, and sedentary lifestyle greatly increases your risk for illness    A healthy diet, regular physical exercise & weight monitoring are important for maintaining a healthy lifestyle    You may be retaining fluid if you have a history of heart failure or if you experience any of the following symptoms:  Weight gain of 3 pounds or more overnight or 5 pounds in a week, increased swelling in our hands or feet or shortness of breath while lying flat in bed.   Please call your doctor as soon as you notice any of these symptoms; do not wait until your next office visit. Recognize signs and symptoms of STROKE:    F-face looks uneven    A-arms unable to move or move unevenly    S-speech slurred or non-existent    T-time-call 911 as soon as signs and symptoms begin-DO NOT go       Back to bed or wait to see if you get better-TIME IS BRAIN.

## 2019-01-16 NOTE — PROCEDURES
Procedure Note    Patient Name: Santiago Patino    Date of Procedure: January 16, 2019    Preoperative Diagnosis: Sacroiliac Joint Dysfunction    Post Operative Diagnosis: same    Procedure: SI Joint Injection left     Consent: Informed consent was obtained prior to the procedure. The patient was given the opportunity to ask questions regarding the procedure and its associated risks. In addition to the potential risks associated with the procedure itself, the patient was informed both verbally and in writing of potential side effects of the use of glucocorticoids. The patient appeared to comprehend the informed consent and desired to have the procedure performed. Procedure: The patient was placed in the prone position on the flouroscopy table and the back was prepped and draped in the usual sterile manner. A #22 gauge spinal needle was then advanced to lie within the SI joint after local Lidocaine 1% injection. A total of 30 mg of preservative free dexamethasone and 5 cc of Lidocaine was introduced into and around the SI joint. The injection area was cleaned and bandaids applied. No excessive bleeding was noted. Patient dressed and was discharged to home with instructions. Discussion: The patient tolerated the procedure well.      Seb Vigil MD  January 16, 2019

## 2019-01-16 NOTE — H&P
Date of Surgery Update: 
Esthela Morrow was seen and examined. History and physical has been reviewed. The patient has been examined. There have been no significant clinical changes since the last office visit.  
 
 
Signed By: Kenyon Spivey MD   
 January 16, 2019 12:54 PM

## 2019-02-28 ENCOUNTER — OFFICE VISIT (OUTPATIENT)
Dept: ORTHOPEDIC SURGERY | Age: 74
End: 2019-02-28

## 2019-02-28 VITALS
SYSTOLIC BLOOD PRESSURE: 108 MMHG | HEIGHT: 63 IN | BODY MASS INDEX: 33.81 KG/M2 | RESPIRATION RATE: 18 BRPM | OXYGEN SATURATION: 99 % | DIASTOLIC BLOOD PRESSURE: 54 MMHG | TEMPERATURE: 97.9 F | WEIGHT: 190.8 LBS | HEART RATE: 79 BPM

## 2019-02-28 DIAGNOSIS — M35.3 PMR (POLYMYALGIA RHEUMATICA) (HCC): ICD-10-CM

## 2019-02-28 DIAGNOSIS — Z79.01 CHRONIC ANTICOAGULATION: ICD-10-CM

## 2019-02-28 DIAGNOSIS — M53.3 SACROILIAC JOINT DYSFUNCTION: Primary | ICD-10-CM

## 2019-02-28 DIAGNOSIS — Z98.1 S/P LUMBAR SPINAL FUSION: ICD-10-CM

## 2019-02-28 DIAGNOSIS — M47.816 LUMBAR FACET ARTHROPATHY: ICD-10-CM

## 2019-02-28 RX ORDER — DICLOFENAC EPOLAMINE 0.01 G/1
1 PATCH TOPICAL EVERY 12 HOURS
Qty: 60 PATCH | Refills: 5 | Status: SHIPPED | OUTPATIENT
Start: 2019-02-28 | End: 2021-09-23 | Stop reason: ALTCHOICE

## 2019-02-28 NOTE — PROGRESS NOTES
[address] 
[address] [place] Phone: [phone] Fax: [phone] Pt's YOB: 1945 ASSESSMENT Diagnoses and all orders for this visit: 
 
1. Sacroiliac joint dysfunction 
-     diclofenac (FLECTOR) 1.3 % pt12; 1 Patch by TransDERmal route every twelve (12) hours every twelve (12) hours. Apply to left hip. 2. Lumbar facet arthropathy 
-     diclofenac (FLECTOR) 1.3 % pt12; 1 Patch by TransDERmal route every twelve (12) hours every twelve (12) hours. Apply to left hip. 3. Chronic anticoagulation 4. S/P lumbar spinal fusion 5. PMR (polymyalgia rheumatica) (HCC) IMPRESSION AND PLAN: 
Layla Booth is a 72 y.o. female with history of low back pain. She reports improvement with a left sacroiliac joint injection on 01/16/2019. Pt requests a refill of Flector patches and continues to take Cymbalta 60 mg QHS with benefit. 1) Pt was given information on sacroiliac exercises. 2) She is not a candidate for NSAID's due to chronic anticoagulation with Eliquis. 3) Pt received a refill of Flector 1.3% patches. 4) Ms. Luis Miguel Rangel has a reminder for a \"due or due soon\" health maintenance. I have asked that she contact her primary care provider, Bela Baltazar MD, for follow-up on this health maintenance. 5)  demonstrated consistency with prescribing. 6) Pt will follow-up in 3 months or sooner if needed. HISTORY OF PRESENT ILLNESS: 
Layla Booth is a 72 y.o. female with history of low back pain and presents to the office today for follow up. She reports improvement with a left sacroiliac joint injection on 01/16/2019. Pt notes that she has been busy taking care of her  due to recent medical issues (he has toxic hepatitis due to an allergic reaction to Levaquin). She uses Flector patches with benefit and requests a refill at this time.  Pt continues to take Cymbalta 60 mg QHS with benefit. Of note, she is on Eliquis. Pt at this time desires to continue with current care. Pain Scale: 2/10 PCP: Mercedes Mosquera MD 
  
Past Medical History:  
Diagnosis Date  Arthritis  Hypercholesterolemia  Hypertension  Ill-defined condition   
 osteopenia  Migraine  PMR [facility]polym[facility]algia rheumatica) ([facility])  Thyroid disease Social History Socioeconomic History  Marital status:  Spouse name: Not on file  Number of children: Not on file  Years of education: Not on file  Highest education level: Not on file Social Needs  Financial resource strain: Not on file  Food insecurity - worry: Not on file  Food insecurity - inability: Not on file  Transportation needs - medical: Not on file  Transportation needs - non-medical: Not on file Occupational History  Not on file Tobacco Use  Smoking status: Former Smoker Packs/day: 1.00 Years: 5.00 Pack years: 5.00 Last attempt to quit: 1970 Years since quittin.1  Smokeless tobacco: Never Used Substance and Sexual Activity  Alcohol use: Yes Alcohol/week: 0.5 oz Types: 1 Glasses of wine per week Comment: 4 nights a week  Drug use: No  
 Sexual activity: Not on file Comment: Hysterectomy Other Topics Concern  Not on file Social History Narrative  Not on file Current Outpatient Medications Medication Sig Dispense Refill  diclofenac (FLECTOR) 1.3 % pt12 1 Patch by TransDERmal route every twelve (12) hours every twelve (12) hours. Apply to left hip. 60 Patch 5  
 apixaban (ELIQUIS) 5 mg tablet Take 5 mg by mouth two (2) times a day.  flecainide acetate (FLECAINIDE PO) Take  by mouth two (2) times a day.  predniSONE (DELTASONE) 1 mg tablet Take 2 mg by mouth daily.  alendronate (FOSAMAX) 70 mg tablet Take 70 mg by mouth every seven (7) days.  DULoxetine (CYMBALTA) 60 mg capsule Take 60 mg by mouth daily.  liothyronine (CYTOMEL) 5 mcg tablet Take 5 mcg by mouth daily.  acetaminophen (TYLENOL EXTRA STRENGTH) 500 mg tablet Take  by mouth every six (6) hours as needed for Pain.  metoprolol succinate (TOPROL-XL) 25 mg XL tablet Take 25 mg by mouth daily. Take 1 tab po q am, take 1/2 tab po q pm  0  
 LEVOTHYROXINE SODIUM (SYNTHROID PO) Take 75 mcg by mouth daily. 8 pills a week  rosuvastatin (CRESTOR) 20 mg tablet Take 20 mg by mouth nightly.  cycloSPORINE (RESTASIS) 0.05 % ophthalmic emulsion Administer 1 Drop to both eyes two (2) times a day.  benazepril (LOTENSIN) 40 mg tablet Take 40 mg by mouth daily.  ALPRAZolam (XANAX) 0.5 mg tablet Take 0.5 mg by mouth. Allergies Allergen Reactions  Adhesive Tape-Silicones Other (comments)  Levaquin [Levofloxacin] Nausea and Vomiting REVIEW OF SYSTEMS Constitutional: Negative for fever, chills, or weight change. Respiratory: Negative for cough or shortness of breath. Cardiovascular: Negative for chest pain or palpitations. Gastrointestinal: Negative for acid reflux, change in bowel habits, or constipation. Genitourinary: Negative for dysuria and flank pain. Musculoskeletal: Positive for lumbar and SI  pain. Skin: Negative for rash. Neurological: Negative for headaches, dizziness, or numbness. Endo/Heme/Allergies: Negative for increased bruising. Psychiatric/Behavioral: Negative for difficulty with sleep. PHYSICAL EXAMINATION Visit Vitals /54 Pulse 79 Temp 97.9 °F (36.6 °C) (Oral) Resp 18 Ht 5' 3\" (1.6 m) Wt 190 lb 12.8 oz (86.5 kg) SpO2 99% BMI 33.80 kg/m² Constitutional: Awake, alert, and in no acute distress. Neurological: 1+ symmetrical DTRs in the upper extremities. 1+ symmetrical DTRs in the lower extremities. Sensation to light touch is intact. Negative Napoleon's sign bilaterally. Skin: warm, dry, and intact. Musculoskeletal: Tenderness to palpation in the lower lumbar region. Moderate pain with extension and axial loading. No pain with internal or external rotation of her hips. Negative straight leg raise bilaterally. Hip Flex  Quads Hamstrings Ankle DF EHL Ankle PF Right +4/5 +4/5 +4/5 +4/5 +4/5 +4/5 Left +4/5 +4/5 +4/5 +4/5 +4/5 +4/5 IMAGING: 
 
Lumbar spine 4V x-rays from 11/08/2018 were personally reviewed with the patient and demonstrated: L2-5 fusion and hardware is intact. Severe degenerative disc L5-S1 and degenerative facets. No instability. Mild calcification in the aorta.  
  
Lumbar Spine MRI from 09/22/2016 was personally reviewed with the Pt and demonstrated: FINDINGS: 
Scoliosis of the lumbar spine convex to the left. Grade 1 retrolisthesis L2 on 
L3 and L3 on L4. Grade 1 anterior listhesis of L4 on L5. Degenerative discogenic 
disease at all levels of the lumbar spine most prominent at L2-L3 where there is 
moderate to severe disc space narrowing. Vertebral body heights are maintained. Marrow signal is within normal limits The conus medullaris terminates at 
T12-L1. No abnormal enhancement of the thecal sac on postcontrast images. Susceptibility artifact from posterior fusion L2-L5 somewhat limits evaluation 
and causes distortion. 2.4 x 0.6 x 0.6 cm fluid collection at the right 
laminotomy site at L3-L4. 6.5 cm in length by 1.6 x 1.3 cm lobular T2 
hyperintense collection in the left posterior paraspinal soft tissues. There may 
be additional small collection posterior to the left spinal canal at L3-L4. Diffuse edema and atrophy of the paraspinal musculature. 
   
L1/2: The spinal canal and neural foramina are widely patent. 
   
L2/3: Diffuse disc bulge and grade 1 retrolisthesis. Mild foraminal stenosis. Mild facet and ligamentous hypertrophy. Central canal is patent. 
   
L3/4: Diffuse disc bulge and slight retrolisthesis.  Central canal and neural 
 foramen are patent. 
   
L4/5: Diffuse disc bulge. Central and neural foramen are patent. 
   
L5/S1: Diffuse disc bulge. Mild/moderate facet arthropathy. Central canal is 
patent. Mild left foraminal stenosis. 
   
IMPRESSION:  
Posterior fusion L2-L5. Central canal is patent at these levels. 
   
Fluid collection at the right hemilaminotomy site and in the adjacent posterior 
paraspinal musculature most likely representing postoperative fluid 
collection/seroma, abscess is unlikely. There may be additional small fluid 
posterior to the left the spinal canal at L3-L4. 
   
Mild degenerative changes at L5-S1. Written by Betina Virk MD, [facility], as dictated by Thea Burkett MD. 
I, Dr. Thea Burkett confirm that all documentation is accurate.

## 2019-02-28 NOTE — PATIENT INSTRUCTIONS
Sacroiliac Pain: Exercises Your Care Instructions Here are some examples of typical rehabilitation exercises for your condition. Start each exercise slowly. Ease off the exercise if you start to have pain. Your doctor or physical therapist will tell you when you can start these exercises and which ones will work best for you. How to do the exercises Knee-to-chest stretch 1. Do not do the knee-to-chest exercise if it causes or increases back or leg pain. 2. Lie on your back with your knees bent and your feet flat on the floor. You can put a small pillow under your head and neck if it is more comfortable. 3. Grasp your hands under one knee and bring the knee to your chest, keeping the other foot flat on the floor. 4. Keep your lower back pressed to the floor. Hold for at least 15 to 30 seconds. 5. Relax and lower the knee to the starting position. Repeat with the other leg. 6. Repeat 2 to 4 times with each leg. 7. To get more stretch, keep your other leg flat on the floor while pulling your knee to your chest. 
 
Bridging 1. Lie on your back with both knees bent. Your knees should be bent about 90 degrees. 2. Tighten your belly muscles by pulling in your belly button toward your spine. Then push your feet into the floor, squeeze your buttocks, and lift your hips off the floor until your shoulders, hips, and knees are all in a straight line. 3. Hold for about 6 seconds as you continue to breathe normally, and then slowly lower your hips back down to the floor and rest for up to 10 seconds. 4. Repeat 8 to 12 times. Hip extension 1. Get down on your hands and knees on the floor. 2. Keeping your back and neck straight, lift one leg straight out behind you. When you lift your leg, keep your hips level. Don't let your back twist, and don't let your hip drop toward the floor. 3. Hold for 6 seconds. Repeat 8 to 12 times with each leg. 4. If you feel steady and strong when you do this exercise, you can make it more difficult. To do this, when you lift your leg, also lift the opposite arm straight out in front of you. For example, lift the left leg and the right arm at the same time. (This is sometimes called the \"bird dog exercise. \") Hold for 6 seconds, and repeat 8 to 12 times on each side. Clamshell 1. Lie on your side with a pillow under your head. Keep your feet and knees together and your knees bent. 2. Raise your top knee, but keep your feet together. Do not let your hips roll back. Your legs should open up like a clamshell. 3. Hold for 6 seconds. 4. Slowly lower your knee back down. Rest for 10 seconds. 5. Repeat 8 to 12 times. 6. Switch to your other side and repeat steps 1 through 5. Hamstring wall stretch 1. Lie on your back in a doorway, with one leg through the open door. 2. Slide your affected leg up the wall to straighten your knee. You should feel a gentle stretch down the back of your leg. 1. Do not arch your back. 2. Do not bend either knee. 3. Keep one heel touching the floor and the other heel touching the wall. Do not point your toes. 3. Hold the stretch for at least 1 minute to begin. Then try to lengthen the time you hold the stretch to as long as 6 minutes. 4. Switch legs, and repeat steps 1 through 3. 
5. Repeat 2 to 4 times. 6. If you do not have a place to do this exercise in a doorway, there is another way to do it: 
7. Lie on your back, and bend one knee. 8. Loop a towel under the ball and toes of that foot, and hold the ends of the towel in your hands. 9. Straighten your knee, and slowly pull back on the towel. You should feel a gentle stretch down the back of your leg. 10. Switch legs, and repeat steps 1 through 3. 
11. Repeat 2 to 4 times. Lower abdominal strengthening 1. Lie on your back with your knees bent and your feet flat on the floor. 2. Tighten your belly muscles by pulling your belly button in toward your spine. 3. Lift one foot off the floor and bring your knee toward your chest, so that your knee is straight above your hip and your leg is bent like the letter \"L. \" 
4. Lift the other knee up to the same position. 5. Lower one leg at a time to the starting position. 6. Keep alternating legs until you have lifted each leg 8 to 12 times. 7. Be sure to keep your belly muscles tight and your back still as you are moving your legs. Be sure to breathe normally. Piriformis stretch 1. Lie on your back with your legs straight. 2. Lift your affected leg, and bend your knee. With your opposite hand, reach across your body, and then gently pull your knee toward your opposite shoulder. 3. Hold the stretch for 15 to 30 seconds. 4. Switch legs and repeat steps 1 through 3. 
5. Repeat 2 to 4 times. Follow-up care is a key part of your treatment and safety. Be sure to make and go to all appointments, and call your doctor if you are having problems. It's also a good idea to know your test results and keep a list of the medicines you take. Where can you learn more? Go to http://geovanna-tish.info/. Enter M680 in the search box to learn more about \"Sacroiliac Pain: Exercises. \" Current as of: September 20, 2018 Content Version: 11.9 © 3685-4560 [employer]. Care instructions adapted under license by [employer] (which disclaims liability or warranty for this information). If you have questions about a medical condition or this instruction, always ask your healthcare professional. [address] any warranty or liability for your use of this information.

## 2019-05-30 ENCOUNTER — OFFICE VISIT (OUTPATIENT)
Dept: ORTHOPEDIC SURGERY | Age: 74
End: 2019-05-30

## 2019-05-30 VITALS
TEMPERATURE: 97.3 F | BODY MASS INDEX: 32.6 KG/M2 | DIASTOLIC BLOOD PRESSURE: 69 MMHG | HEIGHT: 63 IN | SYSTOLIC BLOOD PRESSURE: 107 MMHG | RESPIRATION RATE: 24 BRPM | OXYGEN SATURATION: 94 % | WEIGHT: 184 LBS | HEART RATE: 73 BPM

## 2019-05-30 DIAGNOSIS — Z79.01 CHRONIC ANTICOAGULATION: ICD-10-CM

## 2019-05-30 DIAGNOSIS — M47.816 LUMBAR FACET ARTHROPATHY: ICD-10-CM

## 2019-05-30 DIAGNOSIS — Z98.1 S/P LUMBAR SPINAL FUSION: ICD-10-CM

## 2019-05-30 DIAGNOSIS — M35.3 PMR (POLYMYALGIA RHEUMATICA) (HCC): ICD-10-CM

## 2019-05-30 DIAGNOSIS — M53.3 SACROILIAC JOINT DYSFUNCTION: Primary | ICD-10-CM

## 2019-05-30 NOTE — PATIENT INSTRUCTIONS
Low Back Arthritis: Exercises Your Care Instructions Here are some examples of typical rehabilitation exercises for your condition. Start each exercise slowly. Ease off the exercise if you start to have pain. Your doctor or physical therapist will tell you when you can start these exercises and which ones will work best for you. When you are not being active, find a comfortable position for rest. Some people are comfortable on the floor or a medium-firm bed with a small pillow under their head and another under their knees. Some people prefer to lie on their side with a pillow between their knees. Don't stay in one position for too long. Take short walks (10 to 20 minutes) every 2 to 3 hours. Avoid slopes, hills, and stairs until you feel better. Walk only distances you can manage without pain, especially leg pain. How to do the exercises Pelvic tilt 1. Lie on your back with your knees bent. 2. \"Brace\" your stomachtighten your muscles by pulling in and imagining your belly button moving toward your spine. 3. Press your lower back into the floor. You should feel your hips and pelvis rock back. 4. Hold for 6 seconds while breathing smoothly. 5. Relax and allow your pelvis and hips to rock forward. 6. Repeat 8 to 12 times. Back stretches 1. Get down on your hands and knees on the floor. 2. Relax your head and allow it to droop. Round your back up toward the ceiling until you feel a nice stretch in your upper, middle, and lower back. Hold this stretch for as long as it feels comfortable, or about 15 to 30 seconds. 3. Return to the starting position with a flat back while you are on your hands and knees. 4. Let your back sway by pressing your stomach toward the floor. Lift your buttocks toward the ceiling. 5. Hold this position for 15 to 30 seconds. 6. Repeat 2 to 4 times. Follow-up care is a key part of your treatment and safety.  Be sure to make and go to all appointments, and call your doctor if you are having problems. It's also a good idea to know your test results and keep a list of the medicines you take. Where can you learn more? Go to http://geovanna-tish.info/. Enter F207 in the search box to learn more about \"Low Back Arthritis: Exercises. \" Current as of: September 20, 2018 Content Version: 11.9 © 1475-6547 [employer]. Care instructions adapted under license by [employer] (which disclaims liability or warranty for this information). If you have questions about a medical condition or this instruction, always ask your healthcare professional. [address] any warranty or liability for your use of this information. Sacroiliac Pain: Exercises Your Care Instructions Here are some examples of typical rehabilitation exercises for your condition. Start each exercise slowly. Ease off the exercise if you start to have pain. Your doctor or physical therapist will tell you when you can start these exercises and which ones will work best for you. How to do the exercises Knee-to-chest stretch 7. Do not do the knee-to-chest exercise if it causes or increases back or leg pain. 8. Lie on your back with your knees bent and your feet flat on the floor. You can put a small pillow under your head and neck if it is more comfortable. 9. Grasp your hands under one knee and bring the knee to your chest, keeping the other foot flat on the floor. 10. Keep your lower back pressed to the floor. Hold for at least 15 to 30 seconds. 11. Relax and lower the knee to the starting position. Repeat with the other leg. 12. Repeat 2 to 4 times with each leg. 13. To get more stretch, keep your other leg flat on the floor while pulling your knee to your chest. 
 
Bridging 7. Lie on your back with both knees bent. Your knees should be bent about 90 degrees. 8. Tighten your belly muscles by pulling in your belly button toward your spine. Then push your feet into the floor, squeeze your buttocks, and lift your hips off the floor until your shoulders, hips, and knees are all in a straight line. 9. Hold for about 6 seconds as you continue to breathe normally, and then slowly lower your hips back down to the floor and rest for up to 10 seconds. 10. Repeat 8 to 12 times. Hip extension 1. Get down on your hands and knees on the floor. 2. Keeping your back and neck straight, lift one leg straight out behind you. When you lift your leg, keep your hips level. Don't let your back twist, and don't let your hip drop toward the floor. 3. Hold for 6 seconds. Repeat 8 to 12 times with each leg. 4. If you feel steady and strong when you do this exercise, you can make it more difficult. To do this, when you lift your leg, also lift the opposite arm straight out in front of you. For example, lift the left leg and the right arm at the same time. (This is sometimes called the \"bird dog exercise. \") Hold for 6 seconds, and repeat 8 to 12 times on each side. Clamshell 1. Lie on your side with a pillow under your head. Keep your feet and knees together and your knees bent. 2. Raise your top knee, but keep your feet together. Do not let your hips roll back. Your legs should open up like a clamshell. 3. Hold for 6 seconds. 4. Slowly lower your knee back down. Rest for 10 seconds. 5. Repeat 8 to 12 times. 6. Switch to your other side and repeat steps 1 through 5. Hamstring wall stretch 1. Lie on your back in a doorway, with one leg through the open door. 2. Slide your affected leg up the wall to straighten your knee. You should feel a gentle stretch down the back of your leg. 1. Do not arch your back. 2. Do not bend either knee. 3. Keep one heel touching the floor and the other heel touching the wall. Do not point your toes. 3. Hold the stretch for at least 1 minute to begin. Then try to lengthen the time you hold the stretch to as long as 6 minutes. 4. Switch legs, and repeat steps 1 through 3. 
5. Repeat 2 to 4 times. 6. If you do not have a place to do this exercise in a doorway, there is another way to do it: 
7. Lie on your back, and bend one knee. 8. Loop a towel under the ball and toes of that foot, and hold the ends of the towel in your hands. 9. Straighten your knee, and slowly pull back on the towel. You should feel a gentle stretch down the back of your leg. 10. Switch legs, and repeat steps 1 through 3. 
11. Repeat 2 to 4 times. Lower abdominal strengthening 1. Lie on your back with your knees bent and your feet flat on the floor. 2. Tighten your belly muscles by pulling your belly button in toward your spine. 3. Lift one foot off the floor and bring your knee toward your chest, so that your knee is straight above your hip and your leg is bent like the letter \"L. \" 
4. Lift the other knee up to the same position. 5. Lower one leg at a time to the starting position. 6. Keep alternating legs until you have lifted each leg 8 to 12 times. 7. Be sure to keep your belly muscles tight and your back still as you are moving your legs. Be sure to breathe normally. Piriformis stretch 1. Lie on your back with your legs straight. 2. Lift your affected leg, and bend your knee. With your opposite hand, reach across your body, and then gently pull your knee toward your opposite shoulder. 3. Hold the stretch for 15 to 30 seconds. 4. Switch legs and repeat steps 1 through 3. 
5. Repeat 2 to 4 times. Follow-up care is a key part of your treatment and safety. Be sure to make and go to all appointments, and call your doctor if you are having problems. It's also a good idea to know your test results and keep a list of the medicines you take. Where can you learn more? Go to http://geovanna-tish.info/. Enter O429 in the search box to learn more about \"Sacroiliac Pain: Exercises. \" Current as of: September 20, 2018 Content Version: 11.9 © 6631-2636 [employer]. Care instructions adapted under license by [employer] (which disclaims liability or warranty for this information). If you have questions about a medical condition or this instruction, always ask your healthcare professional. [address] any warranty or liability for your use of this information.

## 2019-05-30 NOTE — PROGRESS NOTES
[facility]  [address], [facility] [address]  Phone: [phone]  Fax: [phone]    Pt's YOB: 1945    ASSESSMENT   Diagnoses and all orders for this visit:    1. Sacroiliac joint dysfunction  -     SCHEDULE SURGERY    2. Lumbar facet arthropathy    3. Chronic anticoagulation    4. S/P lumbar spinal fusion    5. PMR (polymyalgia rheumatica) ([facility])         IMPRESSION AND PLAN:  Gita Bruno is a 72 y.o. female with history of lumbar pain. She notes that the pain in her lower back radiating down the left leg returned about 1 week ago. Pt reports significant improvement with a left sacroiliac joint injection on 01/16/2019 and requests to schedule another injection at this time. She uses Flector 1.3% patches with benefit. 1) Pt was given information on lumbar arthritis and sacroiliac exercises. 2) She was scheduled for a bilateral sacroiliac joint injection. 3) Pt is not a candidate for NSAID's due to chronic anticoagulation with Eliquis. 4) Ms. Davey Potter has a reminder for a \"due or due soon\" health maintenance. I have asked that she contact her primary care provider, Sj Sparks MD, for follow-up on this health maintenance. 5)  demonstrated consistency with prescribing. 6) Pt will follow-up in 1 month or sooner if needed. HISTORY OF PRESENT ILLNESS:  Gita Bruno is a 72 y.o. female with history of lumbar pain and presents to the office today for follow up. She notes that the pain in her lower back radiating down the left leg returned about 1 week ago. Pt notes that her symptoms are most severe upon waking. She notes significant improvement with a left sacroiliac joint injection on 01/16/2019 and requests to schedule another injection at this time. Of, note prior to her last injection, she last received injections in 11/2018 and in 2017. Pt has been using Flector 1.3% patches with benefit.  Pt at this time desires to proceed with steroid injections. Pain Scale: 4/10    PCP: Domenica Mcnamara MD     Past Medical History:   Diagnosis Date    Arthritis     Hypercholesterolemia     Hypertension     Ill-defined condition     osteopenia    Migraine     PMR (polym[facility]algia rheumatica) ([facility])     Thyroid disease         Social History     Socioeconomic History    Marital status:      Spouse name: Not on file    Number of children: Not on file    Years of education: Not on file    Highest education level: Not on file   Occupational History    Not on file   Social Needs    Financial resource strain: Not on file    Food insecurity:     Worry: Not on file     Inability: Not on file    Transportation needs:     Medical: Not on file     Non-medical: Not on file   Tobacco Use    Smoking status: Former Smoker     Packs/day: 1.00     Years: 5.00     Pack years: 5.00     Last attempt to quit: 1970     Years since quittin.4    Smokeless tobacco: Never Used   Substance and Sexual Activity    Alcohol use:  Yes     Alcohol/week: 0.5 oz     Types: 1 Glasses of wine per week     Comment: 4 nights a week    Drug use: No    Sexual activity: Not on file     Comment: Hysterectomy   Lifestyle    Physical activity:     Days per week: Not on file     Minutes per session: Not on file    Stress: Not on file   Relationships    Social connections:     Talks on phone: Not on file     Gets together: Not on file     Attends Scientologist service: Not on file     Active member of club or organization: Not on file     Attends meetings of clubs or organizations: Not on file     Relationship status: Not on file    Intimate partner violence:     Fear of current or ex partner: Not on file     Emotionally abused: Not on file     Physically abused: Not on file     Forced sexual activity: Not on file   Other Topics Concern    Not on file   Social History Narrative    Not on file       Current Outpatient Medications   Medication Sig Dispense Refill    diclofenac (FLECTOR) 1.3 % pt12 1 Patch by TransDERmal route every twelve (12) hours every twelve (12) hours. Apply to left hip. 60 Patch 5    apixaban (ELIQUIS) 5 mg tablet Take 5 mg by mouth two (2) times a day.  flecainide acetate (FLECAINIDE PO) Take  by mouth two (2) times a day.  predniSONE (DELTASONE) 1 mg tablet Take 2 mg by mouth daily.  alendronate (FOSAMAX) 70 mg tablet Take 70 mg by mouth every seven (7) days.  DULoxetine (CYMBALTA) 60 mg capsule Take 60 mg by mouth daily.  liothyronine (CYTOMEL) 5 mcg tablet Take 5 mcg by mouth daily.  acetaminophen (TYLENOL EXTRA STRENGTH) 500 mg tablet Take  by mouth every six (6) hours as needed for Pain.  metoprolol succinate (TOPROL-XL) 25 mg XL tablet Take 25 mg by mouth daily. Take 1 tab po q am, take 1/2 tab po q pm  0    LEVOTHYROXINE SODIUM (SYNTHROID PO) Take 75 mcg by mouth daily. 8 pills a week      rosuvastatin (CRESTOR) 20 mg tablet Take 20 mg by mouth nightly.  cycloSPORINE (RESTASIS) 0.05 % ophthalmic emulsion Administer 1 Drop to both eyes two (2) times a day.  benazepril (LOTENSIN) 40 mg tablet Take 40 mg by mouth daily.  ALPRAZolam (XANAX) 0.5 mg tablet Take 0.5 mg by mouth. Allergies   Allergen Reactions    Adhesive Tape-Silicones Other (comments)    Levaquin [Levofloxacin] Nausea and Vomiting         REVIEW OF SYSTEMS    Constitutional: Negative for fever, chills, or weight change. Respiratory: Negative for cough or shortness of breath. Cardiovascular: Negative for chest pain or palpitations. Gastrointestinal: Negative for acid reflux, change in bowel habits, or constipation. Genitourinary: Negative for dysuria and flank pain. Musculoskeletal: Positive for lumbar and sacroiliac pain. Skin: Negative for rash. Neurological: Negative for headaches, dizziness, or numbness. Endo/Heme/Allergies: Negative for increased bruising.    Psychiatric/Behavioral: Negative for difficulty with sleep. PHYSICAL EXAMINATION  Visit Vitals  /69   Pulse 73   Temp 97.3 °F (36.3 °C)   Resp 24   Ht 5' 3\" (1.6 m)   Wt 184 lb (83.5 kg)   SpO2 94%   BMI 32.59 kg/m²       Constitutional: Awake, alert, and in no acute distress. Neurological: 1+ symmetrical DTRs in the upper extremities. 1+ symmetrical DTRs in the lower extremities. Sensation to light touch is intact. Negative Napoleon's sign bilaterally. Skin: warm, dry, and intact. Musculoskeletal: Tenderness to palpation in the lower lumbar region and over the sacroiliac joints, L>R. Moderate pain with extension and axial loading. No pain with internal or external rotation of her hips. Negative straight leg raise bilaterally. Biceps  Triceps Deltoids Wrist Ext Wrist Flex Hand Intrin   Right +4/5 +4/5 +4/5 +4/5 +4/5 +4/5   Left +4/5 +4/5 +4/5 +4/5 +4/5 +4/5      Hip Flex  Quads Hamstrings Ankle DF EHL Ankle PF   Right +4/5 +4/5 +4/5 +4/5 +4/5 +4/5   Left +4/5 +4/5 +4/5 +4/5 +4/5 +4/5     IMAGING:    Lumbar spine 4V x-rays from 11/08/2018 were personally reviewed with the patient and demonstrated:  L2-5 fusion and hardware is intact. Severe degenerative disc L5-S1 and degenerative facets. No instability. Mild calcification in the aorta.      Lumbar Spine MRI from 09/22/2016 was personally reviewed with the Pt and demonstrated:   FINDINGS:  Scoliosis of the lumbar spine convex to the left. Grade 1 retrolisthesis L2 on  L3 and L3 on L4. Grade 1 anterior listhesis of L4 on L5. Degenerative discogenic  disease at all levels of the lumbar spine most prominent at L2-L3 where there is  moderate to severe disc space narrowing. Vertebral body heights are maintained. Marrow signal is within normal limits The conus medullaris terminates at  T12-L1. No abnormal enhancement of the thecal sac on postcontrast images. Susceptibility artifact from posterior fusion L2-L5 somewhat limits evaluation  and causes distortion.  2.4 x 0.6 x 0.6 cm fluid collection at the right  laminotomy site at L3-L4. 6.5 cm in length by 1.6 x 1.3 cm lobular T2  hyperintense collection in the left posterior paraspinal soft tissues. There may  be additional small collection posterior to the left spinal canal at L3-L4. Diffuse edema and atrophy of the paraspinal musculature.      L1/2: The spinal canal and neural foramina are widely patent.      L2/3: Diffuse disc bulge and grade 1 retrolisthesis. Mild foraminal stenosis. Mild facet and ligamentous hypertrophy. Central canal is patent.      L3/4: Diffuse disc bulge and slight retrolisthesis. Central canal and neural  foramen are patent.      L4/5: Diffuse disc bulge. Central and neural foramen are patent.      L5/S1: Diffuse disc bulge. Mild/moderate facet arthropathy. Central canal is  patent. Mild left foraminal stenosis.      IMPRESSION:   Posterior fusion L2-L5. Central canal is patent at these levels.      Fluid collection at the right hemilaminotomy site and in the adjacent posterior  paraspinal musculature most likely representing postoperative fluid  collection/seroma, abscess is unlikely. There may be additional small fluid  posterior to the left the spinal canal at L3-L4.      Mild degenerative changes at L5-S1.     Written by Anahi Vickers, as dictated by Osvaldo Hernandez MD.  I, Dr. Osvaldo Hernandez confirm that all documentation is accurate.

## 2019-05-30 NOTE — LETTER
5/30/19 Patient: Nicole Yusuf YOB: 1945 Date of Visit: 5/30/2019 Lucrecia Meyer MD 
[address] [place] [address] VIA Facsimile: [phone] Dear Lucrecia Meyer MD, Thank you for referring Ms. Delvis Singh to [facility] for evaluation. My notes for this consultation are attached. If you have questions, please do not hesitate to call me. I look forward to following your patient along with you. Sincerely, Smiley Morel MD

## 2019-06-14 ENCOUNTER — DOCUMENTATION ONLY (OUTPATIENT)
Dept: ORTHOPEDIC SURGERY | Age: 74
End: 2019-06-14

## 2019-06-19 ENCOUNTER — HOSPITAL ENCOUNTER (OUTPATIENT)
Age: 74
Setting detail: OUTPATIENT SURGERY
Discharge: HOME | End: 2019-06-19
Attending: PHYSICAL MEDICINE & REHABILITATION | Admitting: PHYSICAL MEDICINE & REHABILITATION
Payer: MEDICARE

## 2019-06-19 ENCOUNTER — APPOINTMENT (OUTPATIENT)
Dept: GENERAL RADIOLOGY | Age: 74
End: 2019-06-19
Attending: PHYSICAL MEDICINE & REHABILITATION
Payer: MEDICARE

## 2019-06-19 VITALS
HEART RATE: 79 BPM | BODY MASS INDEX: 32.6 KG/M2 | HEIGHT: 63 IN | OXYGEN SATURATION: 97 % | TEMPERATURE: 98.3 F | SYSTOLIC BLOOD PRESSURE: 139 MMHG | RESPIRATION RATE: 16 BRPM | WEIGHT: 184 LBS | DIASTOLIC BLOOD PRESSURE: 65 MMHG

## 2019-06-19 PROCEDURE — 77030003676 HC NDL SPN MPRI -A: Performed by: PHYSICAL MEDICINE & REHABILITATION

## 2019-06-19 PROCEDURE — 74011636320 HC RX REV CODE- 636/320: Performed by: PHYSICAL MEDICINE & REHABILITATION

## 2019-06-19 PROCEDURE — 74011250636 HC RX REV CODE- 250/636: Performed by: PHYSICAL MEDICINE & REHABILITATION

## 2019-06-19 PROCEDURE — 77030039433 HC TY MYLEOGRAM BD -B: Performed by: PHYSICAL MEDICINE & REHABILITATION

## 2019-06-19 PROCEDURE — 76010000009 HC PAIN MGT 0 TO 30 MIN PROC: Performed by: PHYSICAL MEDICINE & REHABILITATION

## 2019-06-19 RX ORDER — LEVOTHYROXINE AND LIOTHYRONINE 38; 9 UG/1; UG/1
60 TABLET ORAL DAILY
COMMUNITY

## 2019-06-19 RX ORDER — DEXAMETHASONE SODIUM PHOSPHATE 100 MG/10ML
INJECTION INTRAMUSCULAR; INTRAVENOUS AS NEEDED
Status: DISCONTINUED | OUTPATIENT
Start: 2019-06-19 | End: 2019-06-19 | Stop reason: HOSPADM

## 2019-06-19 RX ORDER — DIAZEPAM 5 MG/1
5-20 TABLET ORAL ONCE
Status: DISCONTINUED | OUTPATIENT
Start: 2019-06-19 | End: 2019-06-19 | Stop reason: HOSPADM

## 2019-06-19 RX ORDER — LIDOCAINE HYDROCHLORIDE 10 MG/ML
INJECTION, SOLUTION EPIDURAL; INFILTRATION; INTRACAUDAL; PERINEURAL AS NEEDED
Status: DISCONTINUED | OUTPATIENT
Start: 2019-06-19 | End: 2019-06-19 | Stop reason: HOSPADM

## 2019-06-19 NOTE — PERIOP NOTES
Bandaids applied to injection sites. CDI. Patient assist off table by staff without difficulty.  Ambulate from procedure room in stable condition

## 2019-06-19 NOTE — DISCHARGE INSTRUCTIONS
[facility]   ([facility])  Dr. Taran López, Dr. Radha Smith, Dr. Demetrius Castro not drive a car, operate heavy machinery or dangerous equipment for 24 hours. * Activity as tolerated; rest for the remainder of the day. * Resume pre-block medications including those for your family doctor. * Do not drink alcoholic beverages for 24 hours. Alcohol and the medications you have received may interact and cause an adverse reaction. * You may feel better this evening and worse tomorrow, as the numbing medications wears off and the steroid has yet to begin to work. After 48 hrs the steroid should begin to release bringing you relief. * You may shower this evening and remove any bandages. * Avoid hot tubs and heating pads for 24 hours. You may use cold packs on the procedure site as tolerated for the first 24 hours. * If a headache develops, drink plenty of fluids and rest.  Take over the counter medications for headache if needed. If the headache continues longer than 24 hours, call MD at the [address] [place] [address]. [phone]    * Continue taking pain medications as needed. * You may resume your regular diet if tolerated. Otherwise, start with sips of water and advance slowly. * If Diabetic: check your blood sugar three times a day for the next 3 days. If your sugar is greater than 300 call your family doctor. If your sugar is greater than 400, have someone transport you to the nearest Emergency Room. * If you experience any of the following problems, Please Call the [address] [place] [address] at [phone].         * Shortness of Breath    * Fever of 101 or higher    * Nausea / Vomiting    * Severe Headache    * Weakness or numbness in arms or legs that is not      resolving    * Prolonged increase in pain greater than 4 days      DISCHARGE SUMMARY from Nurse      PATIENT INSTRUCTIONS:    After oral sedation, for 24 hours or while taking prescription Narcotics:  · Limit your activities  · Do not drive and operate hazardous machinery  · Do not make important personal or business decisions  · Do  not drink alcoholic beverages  · If you have not urinated within 8 hours after discharge, please contact your surgeon on call. Report the following to your surgeon:  · Excessive pain, swelling, redness or odor of or around the surgical area  · Temperature over 101  · Nausea and vomiting lasting longer than 4 hours or if unable to take medications  · Any signs of decreased circulation or nerve impairment to extremity: change in color, persistent  numbness, tingling, coldness or increase pain  · Any questions            What to do at Home:  Recommended activity: Activity as tolerated, NO DRIVING FOR 12 Hours post injection          *  Please give a list of your current medications to your Primary Care Provider. *  Please update this list whenever your medications are discontinued, doses are      changed, or new medications (including over-the-counter products) are added. *  Please carry medication information at all times in case of emergency situations. These are general instructions for a healthy lifestyle:    No smoking/ No tobacco products/ Avoid exposure to second hand smoke    Surgeon General's Warning:  Quitting smoking now greatly reduces serious risk to your health. Obesity, smoking, and sedentary lifestyle greatly increases your risk for illness    A healthy diet, regular physical exercise & weight monitoring are important for maintaining a healthy lifestyle    You may be retaining fluid if you have a history of heart failure or if you experience any of the following symptoms:  Weight gain of 3 pounds or more overnight or 5 pounds in a week, increased swelling in our hands or feet or shortness of breath while lying flat in bed.   Please call your doctor as soon as you notice any of these symptoms; do not wait until your next office visit. Recognize signs and symptoms of STROKE:    F-face looks uneven    A-arms unable to move or move unevenly    S-speech slurred or non-existent    T-time-call 911 as soon as signs and symptoms begin-DO NOT go       Back to bed or wait to see if you get better-TIME IS BRAIN.

## 2019-06-19 NOTE — PROCEDURES
Bi Procedure Note    Patient Name: Staci Villanueva    Date of Procedure: June 19, 2019    Preoperative Diagnosis: Bilateral Sacroiliac Joint Pain    Post Operative Diagnosis: same    Procedure: SI Joint Injection bilateral      Consent: Informed consent was obtained prior to the procedure. The patient was given the opportunity to ask questions regarding the procedure and its associated risks. In addition to the potential risks associated with the procedure itself, the patient was informed both verbally and in writing of potential side effects of the use of glucocorticoids. The patient appeared to comprehend the informed consent and desired to have the procedure performed. Procedure: The patient was placed in the prone position on the flouroscopy table and the back was prepped and draped in the usual sterile manner. A #22 gauge spinal needle was then advanced to lie within the SI joint after local Lidocaine 1% injection. 1 cc isovue used to confirm the position. The procedure was repeated for the contralateral SI joint. A total of 30 mg of preservative free dexamethasone and 5 ml of Lidocaine was introduced into and around the SI joints. The injection area was cleaned and bandaids applied. No excessive bleeding was noted. Patient dressed and was discharged to home with instructions. Discussion:  (x) The patient tolerated the procedure well.     ( )       Becky Driscoll MD  June 19, 2019

## 2019-06-19 NOTE — H&P
Date of Surgery Update:  Evy Martínez was seen and examined. History and physical has been reviewed. The patient has been examined. There have been no significant clinical changes since the last office visit.       Signed By: Aimee Vizcarra MD     June 19, 2019 12:49 PM

## 2019-08-15 ENCOUNTER — OFFICE VISIT (OUTPATIENT)
Dept: ORTHOPEDIC SURGERY | Age: 74
End: 2019-08-15

## 2019-08-15 VITALS
OXYGEN SATURATION: 98 % | DIASTOLIC BLOOD PRESSURE: 53 MMHG | WEIGHT: 190.2 LBS | RESPIRATION RATE: 16 BRPM | TEMPERATURE: 97.2 F | HEIGHT: 63 IN | BODY MASS INDEX: 33.7 KG/M2 | SYSTOLIC BLOOD PRESSURE: 104 MMHG | HEART RATE: 65 BPM

## 2019-08-15 DIAGNOSIS — M35.3 PMR (POLYMYALGIA RHEUMATICA) (HCC): ICD-10-CM

## 2019-08-15 DIAGNOSIS — Z79.01 CHRONIC ANTICOAGULATION: ICD-10-CM

## 2019-08-15 DIAGNOSIS — Z98.1 S/P LUMBAR SPINAL FUSION: ICD-10-CM

## 2019-08-15 DIAGNOSIS — M47.816 LUMBAR FACET ARTHROPATHY: ICD-10-CM

## 2019-08-15 DIAGNOSIS — M53.3 SACROILIAC JOINT DYSFUNCTION: Primary | ICD-10-CM

## 2019-08-15 NOTE — PATIENT INSTRUCTIONS
Low Back Arthritis: Exercises Introduction Here are some examples of typical rehabilitation exercises for your condition. Start each exercise slowly. Ease off the exercise if you start to have pain. Your doctor or physical therapist will tell you when you can start these exercises and which ones will work best for you. When you are not being active, find a comfortable position for rest. Some people are comfortable on the floor or a medium-firm bed with a small pillow under their head and another under their knees. Some people prefer to lie on their side with a pillow between their knees. Don't stay in one position for too long. Take short walks (10 to 20 minutes) every 2 to 3 hours. Avoid slopes, hills, and stairs until you feel better. Walk only distances you can manage without pain, especially leg pain. How to do the exercises Pelvic tilt 1. Lie on your back with your knees bent. 2. \"Brace\" your stomachtighten your muscles by pulling in and imagining your belly button moving toward your spine. 3. Press your lower back into the floor. You should feel your hips and pelvis rock back. 4. Hold for 6 seconds while breathing smoothly. 5. Relax and allow your pelvis and hips to rock forward. 6. Repeat 8 to 12 times. Back stretches 1. Get down on your hands and knees on the floor. 2. Relax your head and allow it to droop. Round your back up toward the ceiling until you feel a nice stretch in your upper, middle, and lower back. Hold this stretch for as long as it feels comfortable, or about 15 to 30 seconds. 3. Return to the starting position with a flat back while you are on your hands and knees. 4. Let your back sway by pressing your stomach toward the floor. Lift your buttocks toward the ceiling. 5. Hold this position for 15 to 30 seconds. 6. Repeat 2 to 4 times. Follow-up care is a key part of your treatment and safety.  Be sure to make and go to all appointments, and call your doctor if you are having problems. It's also a good idea to know your test results and keep a list of the medicines you take. Where can you learn more? Go to http://geovanna-tish.info/. Enter R498 in the search box to learn more about \"Low Back Arthritis: Exercises. \" Current as of: September 20, 2018 Content Version: 12.1 © 9502-9449 [employer]. Care instructions adapted under license by [employer] (which disclaims liability or warranty for this information). If you have questions about a medical condition or this instruction, always ask your healthcare professional. [address] any warranty or liability for your use of this information.

## 2019-08-15 NOTE — LETTER
8/16/19 Patient: Milady Da Silva YOB: 1945 Date of Visit: 8/15/2019 Hailey Carrasquillo MD 
[address] [place] [address] VIA Facsimile: [phone] Dear Hailey Carrasquillo MD, Thank you for referring Ms. Candi Marks to [facility] for evaluation. My notes for this consultation are attached. If you have questions, please do not hesitate to call me. I look forward to following your patient along with you. Sincerely, Tere Gutierrez MD

## 2019-12-12 ENCOUNTER — OFFICE VISIT (OUTPATIENT)
Dept: ORTHOPEDIC SURGERY | Age: 74
End: 2019-12-12

## 2019-12-12 VITALS
WEIGHT: 191.4 LBS | TEMPERATURE: 98.2 F | DIASTOLIC BLOOD PRESSURE: 58 MMHG | SYSTOLIC BLOOD PRESSURE: 117 MMHG | HEIGHT: 64 IN | HEART RATE: 67 BPM | OXYGEN SATURATION: 96 % | BODY MASS INDEX: 32.68 KG/M2 | RESPIRATION RATE: 18 BRPM

## 2019-12-12 DIAGNOSIS — M54.50 ACUTE LOW BACK PAIN, UNSPECIFIED BACK PAIN LATERALITY, UNSPECIFIED WHETHER SCIATICA PRESENT: Primary | ICD-10-CM

## 2019-12-12 DIAGNOSIS — M35.3 PMR (POLYMYALGIA RHEUMATICA) (HCC): ICD-10-CM

## 2019-12-12 DIAGNOSIS — M62.838 MUSCLE SPASM: ICD-10-CM

## 2019-12-12 DIAGNOSIS — M53.3 PAIN OF BOTH SACROILIAC JOINTS: ICD-10-CM

## 2019-12-12 DIAGNOSIS — Z79.01 CHRONIC ANTICOAGULATION: ICD-10-CM

## 2019-12-12 RX ORDER — TRAMADOL HYDROCHLORIDE 50 MG/1
50 TABLET ORAL
Qty: 28 TAB | Refills: 0 | Status: SHIPPED | OUTPATIENT
Start: 2019-12-12 | End: 2019-12-19

## 2019-12-12 RX ORDER — BUPROPION HYDROCHLORIDE 150 MG/1
150 TABLET ORAL DAILY
COMMUNITY
Start: 2019-10-28 | End: 2021-09-23 | Stop reason: ALTCHOICE

## 2019-12-12 RX ORDER — METHYLPREDNISOLONE 4 MG/1
TABLET ORAL
Qty: 1 DOSE PACK | Refills: 1 | Status: SHIPPED | OUTPATIENT
Start: 2019-12-12 | End: 2020-06-01 | Stop reason: ALTCHOICE

## 2019-12-12 NOTE — LETTER
12/20/19 Patient: Dylan Diane YOB: 1945 Date of Visit: 12/12/2019 Maxx Villanueva MD 
[address] [place] [address] VIA Facsimile: [phone] Dear Maxx Villanueva MD, Thank you for referring Ms. Manpreet Salinas to [facility] for evaluation. My notes for this consultation are attached. If you have questions, please do not hesitate to call me. I look forward to following your patient along with you. Sincerely, Betzy Jimenez MD

## 2019-12-12 NOTE — PROGRESS NOTES
[facility]  [address] [place], [facility] [address]  Phone: [phone]  Fax: [phone]    Pt's YOB: 1945    ASSESSMENT   Diagnoses and all orders for this visit:    1. Acute low back pain, unspecified back pain laterality, unspecified whether sciatica present  -     methylPREDNISolone (MEDROL DOSEPACK) 4 mg tablet; Per dose pack instructions. -     traMADol (ULTRAM) 50 mg tablet; Take 1 Tab by mouth every six (6) hours as needed for Pain for up to 7 days. Max Daily Amount: 200 mg.    2. Pain of both sacroiliac joints  -     methylPREDNISolone (MEDROL DOSEPACK) 4 mg tablet; Per dose pack instructions.  -     SCHEDULE SURGERY  -     traMADol (ULTRAM) 50 mg tablet; Take 1 Tab by mouth every six (6) hours as needed for Pain for up to 7 days. Max Daily Amount: 200 mg.    3. Muscle spasm    4. Chronic anticoagulation    5. PMR (polymyalgia rheumatica) ([facility])         IMPRESSION AND PLAN:  Bryant Carter is a 69 y.o. female with history of lumbar pain. She notes increasing low back pain that radiates down both legs (L>R). She reports the onset of pain in early November 2019. Her pain has gradually improved with ice. Pt uses Flector 1.3% patches with some benefit. She is currently on Eliquis. She has previously tried Neurontin and muscle relaxers with no relief. 1) Pt was given information on lumbar arthritis exercises. 2) She received a Medrol Dosepak. 3) She will continue using the Flector 1.3% patches. 5)  Pt is not a candidate for NSAID's due to chronic anticoagulation with Eliquis. 6) She was prescribed Ultram 50 mg.  7) Ms. Rola Zamarripa has a reminder for a \"due or due soon\" health maintenance. I have asked that she contact her primary care provider, Rey Stoner MD, for follow-up on this health maintenance. 8)  demonstrated consistency with prescribing.      Follow-up and Dispositions    · Return in about 6 weeks (around 2020) for Medication follow up. HISTORY OF PRESENT ILLNESS:  Mehran Arriaga is a 69 y.o. female with history of lumbar pain and presents to the office today for follow up. She notes increasing low back pain that radiates down both legs (L>R). She reports the onset of pain in early 2019. Her pain has gradually improved with ice. Pt uses Flector 1.3% patches with some benefit. She is currently on Eliquis. She has previously tried Neurontin and muscle relaxers with no relief   She denies any loss of bowel/bladder control or leg weakness. Pt at this time desires to continue with current care. Pain Scale: /10    PCP: Lilli De Paz MD     Past Medical History:   Diagnosis Date    Arthritis     Hypercholesterolemia     Hypertension     Ill-defined condition     osteopenia    Migraine     PMR [facility] rheumatica) ([facility])     Thyroid disease         Social History     Socioeconomic History    Marital status:      Spouse name: Not on file    Number of children: Not on file    Years of education: Not on file    Highest education level: Not on file   Occupational History    Not on file   Social Needs    Financial resource strain: Not on file    Food insecurity:     Worry: Not on file     Inability: Not on file    Transportation needs:     Medical: Not on file     Non-medical: Not on file   Tobacco Use    Smoking status: Former Smoker     Packs/day: 1.00     Years: 5.00     Pack years: 5.00     Last attempt to quit: 1970     Years since quittin.9    Smokeless tobacco: Never Used   Substance and Sexual Activity    Alcohol use:  Yes     Alcohol/week: 0.8 standard drinks     Types: 1 Glasses of wine per week     Comment: 4 nights a week    Drug use: No    Sexual activity: Not on file     Comment: Hysterectomy   Lifestyle    Physical activity:     Days per week: Not on file     Minutes per session: Not on file    Stress: Not on file   Relationships    Social connections:     Talks on phone: Not on file     Gets together: Not on file     Attends Druze service: Not on file     Active member of club or organization: Not on file     Attends meetings of clubs or organizations: Not on file     Relationship status: Not on file    Intimate partner violence:     Fear of current or ex partner: Not on file     Emotionally abused: Not on file     Physically abused: Not on file     Forced sexual activity: Not on file   Other Topics Concern    Not on file   Social History Narrative    Not on file       Current Outpatient Medications   Medication Sig Dispense Refill    methylPREDNISolone (MEDROL DOSEPACK) 4 mg tablet Per dose pack instructions. 1 Dose Pack 1    buPROPion XL (WELLBUTRIN XL) 150 mg tablet Take 150 mg by mouth daily.  thyroid, Pork, (ARMOUR THYROID) 60 mg tablet Take 60 mg by mouth daily. Plus 50 mg qdaily      diclofenac (FLECTOR) 1.3 % pt12 1 Patch by TransDERmal route every twelve (12) hours every twelve (12) hours. Apply to left hip. 60 Patch 5    apixaban (ELIQUIS) 5 mg tablet Take 5 mg by mouth two (2) times a day.  flecainide acetate (FLECAINIDE PO) Take  by mouth two (2) times a day.  predniSONE (DELTASONE) 1 mg tablet Take 3 mg by mouth daily.  DULoxetine (CYMBALTA) 60 mg capsule Take 60 mg by mouth daily.  acetaminophen (TYLENOL EXTRA STRENGTH) 500 mg tablet Take  by mouth every six (6) hours as needed for Pain.  metoprolol succinate (TOPROL-XL) 25 mg XL tablet Take 25 mg by mouth daily. Take 1 tab po q am, take 1/2 tab po q pm  0    rosuvastatin (CRESTOR) 20 mg tablet Take 20 mg by mouth nightly.  cycloSPORINE (RESTASIS) 0.05 % ophthalmic emulsion Administer 1 Drop to both eyes two (2) times a day.  benazepril (LOTENSIN) 40 mg tablet Take 40 mg by mouth daily.  ALPRAZolam (XANAX) 0.5 mg tablet Take 0.5 mg by mouth.          Allergies   Allergen Reactions    Adhesive Tape-Silicones Other (comments)    Levaquin [Levofloxacin] Nausea and Vomiting         REVIEW OF SYSTEMS    Constitutional: Negative for fever, chills, or weight change. Respiratory: Negative for cough or shortness of breath. Cardiovascular: Negative for chest pain or palpitations. Gastrointestinal: Negative for acid reflux, change in bowel habits, or constipation. Genitourinary: Negative for dysuria and flank pain. Musculoskeletal: Positive for lumbar pain. Skin: Negative for rash. Neurological: Negative for headaches, dizziness, or numbness. Endo/Heme/Allergies: Negative for increased bruising. Psychiatric/Behavioral: Positive for difficulty with sleep. PHYSICAL EXAMINATION  Visit Vitals  /58   Pulse 67   Temp 98.2 °F (36.8 °C) (Oral)   Resp 18   Ht 5' 3.5\" (1.613 m)   Wt 191 lb 6.4 oz (86.8 kg)   SpO2 96%   BMI 33.37 kg/m²       Constitutional: Awake, alert, and in no acute distress. Neurological: 1+ symmetrical DTRs in the lower extremities. Sensation to light touch is intact. Skin: warm, dry, and intact. Musculoskeletal: Tenderness to palpation in the left lower lumbar region. Moderate pain with extension and axial loading. No pain with internal or external rotation of her hips. Negative straight leg raise bilaterally. Hip Flex  Quads Hamstrings Ankle DF EHL Ankle PF   Right  4/5  4/5  4/5 +4/5 +4/5 +4/5   Left  4/5  4/5  4/5 +4/5 +4/5 +4/5     IMAGING:    Lumbar spine 4V x-rays from 11/08/2018 were personally reviewed and demonstrated:  L2-5 fusion and hardware is intact. Severe degenerative disc L5-S1 and degenerative facets. No instability. Mild calcification in the aorta.      Lumbar Spine MRI from 09/22/2016 was personally reviewed and demonstrated:   FINDINGS:  Scoliosis of the lumbar spine convex to the left. Grade 1 retrolisthesis L2 on  L3 and L3 on L4. Grade 1 anterior listhesis of L4 on L5.  Degenerative discogenic  disease at all levels of the lumbar spine most prominent at L2-L3 where there is  moderate to severe disc space narrowing. Vertebral body heights are maintained. Marrow signal is within normal limits The conus medullaris terminates at  T12-L1. No abnormal enhancement of the thecal sac on postcontrast images. Susceptibility artifact from posterior fusion L2-L5 somewhat limits evaluation  and causes distortion. 2.4 x 0.6 x 0.6 cm fluid collection at the right  laminotomy site at L3-L4. 6.5 cm in length by 1.6 x 1.3 cm lobular T2  hyperintense collection in the left posterior paraspinal soft tissues. There may  be additional small collection posterior to the left spinal canal at L3-L4. Diffuse edema and atrophy of the paraspinal musculature.      L1/2: The spinal canal and neural foramina are widely patent.      L2/3: Diffuse disc bulge and grade 1 retrolisthesis. Mild foraminal stenosis. Mild facet and ligamentous hypertrophy. Central canal is patent.      L3/4: Diffuse disc bulge and slight retrolisthesis. Central canal and neural  foramen are patent.      L4/5: Diffuse disc bulge. Central and neural foramen are patent.      L5/S1: Diffuse disc bulge. Mild/moderate facet arthropathy. Central canal is  patent. Mild left foraminal stenosis.      IMPRESSION:   Posterior fusion L2-L5. Central canal is patent at these levels.      Fluid collection at the right hemilaminotomy site and in the adjacent posterior  paraspinal musculature most likely representing postoperative fluid  collection/seroma, abscess is unlikely. There may be additional small fluid  posterior to the left the spinal canal at L3-L4.      Mild degenerative changes at L5-S1. Written by Alysia Yun, as dictated by Yue Suggs MD.  I, Dr. Yue Suggs confirm that all documentation is accurate.

## 2020-01-07 ENCOUNTER — DOCUMENTATION ONLY (OUTPATIENT)
Dept: ORTHOPEDIC SURGERY | Age: 75
End: 2020-01-07

## 2020-01-15 ENCOUNTER — APPOINTMENT (OUTPATIENT)
Dept: GENERAL RADIOLOGY | Age: 75
End: 2020-01-15
Attending: PHYSICAL MEDICINE & REHABILITATION
Payer: MEDICARE

## 2020-01-15 ENCOUNTER — HOSPITAL ENCOUNTER (OUTPATIENT)
Age: 75
Setting detail: OUTPATIENT SURGERY
Discharge: HOME | End: 2020-01-15
Attending: PHYSICAL MEDICINE & REHABILITATION | Admitting: PHYSICAL MEDICINE & REHABILITATION
Payer: MEDICARE

## 2020-01-15 VITALS
HEIGHT: 63 IN | BODY MASS INDEX: 33.84 KG/M2 | WEIGHT: 191 LBS | SYSTOLIC BLOOD PRESSURE: 148 MMHG | DIASTOLIC BLOOD PRESSURE: 79 MMHG | OXYGEN SATURATION: 96 % | HEART RATE: 108 BPM | RESPIRATION RATE: 18 BRPM | TEMPERATURE: 97.5 F

## 2020-01-15 PROCEDURE — 77030039433 HC TY MYLEOGRAM BD -B: Performed by: PHYSICAL MEDICINE & REHABILITATION

## 2020-01-15 PROCEDURE — 76010000009 HC PAIN MGT 0 TO 30 MIN PROC: Performed by: PHYSICAL MEDICINE & REHABILITATION

## 2020-01-15 PROCEDURE — 77030003676 HC NDL SPN MPRI -A: Performed by: PHYSICAL MEDICINE & REHABILITATION

## 2020-01-15 PROCEDURE — 74011000250 HC RX REV CODE- 250: Performed by: PHYSICAL MEDICINE & REHABILITATION

## 2020-01-15 PROCEDURE — 74011636320 HC RX REV CODE- 636/320: Performed by: PHYSICAL MEDICINE & REHABILITATION

## 2020-01-15 RX ORDER — DIAZEPAM 5 MG/1
5-20 TABLET ORAL ONCE
Status: CANCELLED | OUTPATIENT
Start: 2020-01-15 | End: 2020-01-15

## 2020-01-15 RX ORDER — LIDOCAINE HYDROCHLORIDE 10 MG/ML
INJECTION, SOLUTION EPIDURAL; INFILTRATION; INTRACAUDAL; PERINEURAL AS NEEDED
Status: DISCONTINUED | OUTPATIENT
Start: 2020-01-15 | End: 2020-01-15 | Stop reason: HOSPADM

## 2020-01-15 NOTE — H&P
Date of Surgery Update:  Brittany Carpenter was seen and examined. History and physical has been reviewed. The patient has been examined. There have been no significant clinical changes since the last office visit.       Signed By: Daisy Lassiter MD     January 15, 2020 2:04 PM

## 2020-01-15 NOTE — DISCHARGE INSTRUCTIONS
[facility]   ([facility])  Dr. Frieda Wright, Dr. Cintia James, Dr. Keeley Stovall not drive a car, operate heavy machinery or dangerous equipment for 24 hours. * Activity as tolerated; rest for the remainder of the day. * Resume pre-block medications including those for your family doctor. * Do not drink alcoholic beverages for 24 hours. Alcohol and the medications you have received may interact and cause an adverse reaction. * You may feel better this evening and worse tomorrow, as the numbing medications wears off and the steroid has yet to begin to work. After 48 hrs the steroid should begin to release bringing you relief. * You may shower this evening and remove any bandages. * Avoid hot tubs and heating pads for 24 hours. You may use cold packs on the procedure site as tolerated for the first 24 hours. * If a headache develops, drink plenty of fluids and rest.  Take over the counter medications for headache if needed. If the headache continues longer than 24 hours, call MD at the [address] [place] [address]. [phone]    * Continue taking pain medications as needed. * You may resume your regular diet if tolerated. Otherwise, start with sips of water and advance slowly. * If Diabetic: check your blood sugar three times a day for the next 3 days. If your sugar is greater than 300 call your family doctor. If your sugar is greater than 400, have someone transport you to the nearest Emergency Room. * If you experience any of the following problems, Please Call the [address] [place] [address] at [phone].         * Shortness of Breath    * Fever of 101 or higher    * Nausea / Vomiting    * Severe Headache    * Weakness or numbness in arms or legs that is not      resolving    * Prolonged increase in pain greater than 4 days      DISCHARGE SUMMARY from Nurse      PATIENT INSTRUCTIONS:    After oral sedation, for 24 hours or while taking prescription Narcotics:  · Limit your activities  · Do not drive and operate hazardous machinery  · Do not make important personal or business decisions  · Do  not drink alcoholic beverages  · If you have not urinated within 8 hours after discharge, please contact your surgeon on call. Report the following to your surgeon:  · Excessive pain, swelling, redness or odor of or around the surgical area  · Temperature over 101  · Nausea and vomiting lasting longer than 4 hours or if unable to take medications  · Any signs of decreased circulation or nerve impairment to extremity: change in color, persistent  numbness, tingling, coldness or increase pain  · Any questions            What to do at Home:  Recommended activity: Activity as tolerated, NO DRIVING FOR 12 Hours post injection          *  Please give a list of your current medications to your Primary Care Provider. *  Please update this list whenever your medications are discontinued, doses are      changed, or new medications (including over-the-counter products) are added. *  Please carry medication information at all times in case of emergency situations. These are general instructions for a healthy lifestyle:    No smoking/ No tobacco products/ Avoid exposure to second hand smoke    Surgeon General's Warning:  Quitting smoking now greatly reduces serious risk to your health. Obesity, smoking, and sedentary lifestyle greatly increases your risk for illness    A healthy diet, regular physical exercise & weight monitoring are important for maintaining a healthy lifestyle    You may be retaining fluid if you have a history of heart failure or if you experience any of the following symptoms:  Weight gain of 3 pounds or more overnight or 5 pounds in a week, increased swelling in our hands or feet or shortness of breath while lying flat in bed.   Please call your doctor as soon as you notice any of these symptoms; do not wait until your next office visit. Recognize signs and symptoms of STROKE:    F-face looks uneven    A-arms unable to move or move unevenly    S-speech slurred or non-existent    T-time-call 911 as soon as signs and symptoms begin-DO NOT go       Back to bed or wait to see if you get better-TIME IS BRAIN.

## 2020-02-03 ENCOUNTER — TELEPHONE (OUTPATIENT)
Dept: ORTHOPEDIC SURGERY | Age: 75
End: 2020-02-03

## 2020-02-03 NOTE — TELEPHONE ENCOUNTER
Looks like dr Wilian Costa gave her a MDP w/ a refill back in December, did she take both of those?  If not I would recommend taking that

## 2020-02-03 NOTE — TELEPHONE ENCOUNTER
Patient called and stated that her back went out\", patient asked  if she could be prescribed something for pain temporarily, so she can travel out of town tomorrow to attend her grandsons graduation.  Please advise     Patient tel: [phone]

## 2020-02-03 NOTE — TELEPHONE ENCOUNTER
Returned call to patient, verified Name/, informed patient of message below. Per patient she has not filled her refill of her MDP as of today's date. Per patient she will call [facility] pharmacy to fill Rx. No further action required at this time.

## 2020-02-12 NOTE — PROGRESS NOTES
[facility]  Jaclyn Mcdonald., [address] [place] [address], [facility] [address]  Phone: [phone]  Fax: [phone]    Pt's YOB: 1945    ASSESSMENT   Diagnoses and all orders for this visit:    1. Lumbar pain  -     AMB POC XRAY, SPINE, LUMBOSACRAL; 4+  -     MRI LUMB SPINE WO CONT; Future  -     traMADol (ULTRAM) 50 mg tablet; Take 1 Tab by mouth every four to six (4-6) hours as needed (Severe Pain) for up to 7 days. Max Daily Amount: 300 mg.    2. Acute bilateral low back pain with right-sided sciatica  -     MRI LUMB SPINE WO CONT; Future  -     predniSONE (DELTASONE) 10 mg tablet; 6 pills Day 1, 5 pills Day 2, 4 pills Day 3&4, 3 pills Day 5&6, 2 pills Day 7&8, 1 pill Day 9&10, 1/2 pill Day 11&12  -     traMADol (ULTRAM) 50 mg tablet; Take 1 Tab by mouth every four to six (4-6) hours as needed (Severe Pain) for up to 7 days. Max Daily Amount: 300 mg.    3. Muscle spasm  -     MRI LUMB SPINE WO CONT; Future    4. Right leg weakness  -     MRI LUMB SPINE WO CONT; Future    5. Lumbar facet arthropathy  -     MRI LUMB SPINE WO CONT; Future  -     predniSONE (DELTASONE) 10 mg tablet; 6 pills Day 1, 5 pills Day 2, 4 pills Day 3&4, 3 pills Day 5&6, 2 pills Day 7&8, 1 pill Day 9&10, 1/2 pill Day 11&12    6. Chronic anticoagulation    7. PMR (polymyalgia rheumatica) ([facility])         IMPRESSION AND PLAN:  Javier Veloz is a 69 y.o. female with history of chronic lumbar pain. She complains of increased pain across the lower back but denies any pain radiating down the legs at this time. About 12-13 days ago she experienced severe right lower back pain with electrical pain radiating down the right thigh. Pt takes Ultram 50 mg intermittently as her pain warrants with benefit. 1) Pt was given information on lumbar arthritis exercises. 2) She is not a candidate for NSAID's due to chronic anticoagulation with Eliquis. 3) A lumbar MRI was ordered.  She has progressive lumbar pain and is not a candidate for NSAID's.   4) Pt was prescribed a large prednisone taper. 5) She was also prescribed Ultram 50 mg 1 tab Q4-6 hours prn severe pain. 6) Ms. Whitney Hoff has a reminder for a \"due or due soon\" health maintenance. I have asked that she contact her primary care provider, Diana Shaikh MD, for follow-up on this health maintenance. 7)  demonstrated consistency with prescribing. Follow-up and Dispositions    · Return in about 2 weeks (around 2/27/2020) for Diagnostic Test follow up, Medication follow up. HISTORY OF PRESENT ILLNESS:  Shanika Chinchilla is a 69 y.o. female with history of chronic lumbar pain and presents to the office today for follow up. She complains of increased pain across the lower back but denies any pain radiating down the legs at this time. Pt admits that her left-sided lower back pain is generally more severe, but more recently, her right-sided symptoms have been more severe. About 12-13 days ago she was sitting while painting her granddaughter's nails, and when she turned she experienced severe right lower back pain with electrical pain radiating down the right thigh. She admits to increased pain with cold and rainy weather. Of note, she had a L2-3, L3-4, and L4/5 laminectomy TLIF by Dr. Garth Camargo on 03/16/2015. Pt takes Ultram 50 mg intermittently as her pain warrants with benefit. She admits that she sleeps better at night when taking the Ultram. Pt notes improvement with a Medrol Dosepak since her last office visit. She states that she tapered off the prednisone about 3 weeks for her polymyalgia rheumatica. Pt notes that she was on the prednisone for about 13 years. Of note, she is also on Eliquis. Pt at this time desires to proceed with a lumbar MRI.     Pain Scale: 4/10    PCP: Diana Shaikh MD     Past Medical History:   Diagnosis Date    Arthritis     Hypercholesterolemia     Hypertension     Ill-defined condition     osteopenia    Migraine     PMR (polym[facility]algia rheumatica) ([facility])     Thyroid disease         Social History     Socioeconomic History    Marital status:      Spouse name: Not on file    Number of children: Not on file    Years of education: Not on file    Highest education level: Not on file   Occupational History    Not on file   Social Needs    Financial resource strain: Not on file    Food insecurity:     Worry: Not on file     Inability: Not on file    Transportation needs:     Medical: Not on file     Non-medical: Not on file   Tobacco Use    Smoking status: Former Smoker     Packs/day: 1.00     Years: 5.00     Pack years: 5.00     Last attempt to quit: 1970     Years since quittin.1    Smokeless tobacco: Never Used   Substance and Sexual Activity    Alcohol use:  Yes     Alcohol/week: 0.8 standard drinks     Types: 1 Glasses of wine per week     Comment: 4 nights a week    Drug use: No    Sexual activity: Not on file     Comment: Hysterectomy   Lifestyle    Physical activity:     Days per week: Not on file     Minutes per session: Not on file    Stress: Not on file   Relationships    Social connections:     Talks on phone: Not on file     Gets together: Not on file     Attends [employer] service: Not on file     Active member of club or organization: Not on file     Attends meetings of clubs or organizations: Not on file     Relationship status: Not on file    Intimate partner violence:     Fear of current or ex partner: Not on file     Emotionally abused: Not on file     Physically abused: Not on file     Forced sexual activity: Not on file   Other Topics Concern    Not on file   Social History Narrative    Not on file       Current Outpatient Medications   Medication Sig Dispense Refill    predniSONE (DELTASONE) 10 mg tablet 6 pills Day 1, 5 pills Day 2, 4 pills Day 3&4, 3 pills Day 5&6, 2 pills Day 7&8, 1 pill Day 9&10, 1/2 pill Day 11&12 32 Tab 0    traMADol (ULTRAM) 50 mg tablet Take 1 Tab by mouth every four to six (4-6) hours as needed (Severe Pain) for up to 7 days. Max Daily Amount: 300 mg. 42 Tab 0    buPROPion XL (WELLBUTRIN XL) 150 mg tablet Take 150 mg by mouth daily.  thyroid, Pork, (ARMOUR THYROID) 60 mg tablet Take 60 mg by mouth daily. Plus 50 mg qdaily      diclofenac (FLECTOR) 1.3 % pt12 1 Patch by TransDERmal route every twelve (12) hours every twelve (12) hours. Apply to left hip. 60 Patch 5    apixaban (ELIQUIS) 5 mg tablet Take 5 mg by mouth two (2) times a day.  flecainide acetate (FLECAINIDE PO) Take  by mouth two (2) times a day.  predniSONE (DELTASONE) 1 mg tablet Take 3 mg by mouth daily.  DULoxetine (CYMBALTA) 60 mg capsule Take 60 mg by mouth daily.  acetaminophen (TYLENOL EXTRA STRENGTH) 500 mg tablet Take  by mouth every six (6) hours as needed for Pain.  metoprolol succinate (TOPROL-XL) 25 mg XL tablet Take 25 mg by mouth daily. Take 1 tab po q am, take 1/2 tab po q pm  0    rosuvastatin (CRESTOR) 20 mg tablet Take 20 mg by mouth nightly.  cycloSPORINE (RESTASIS) 0.05 % ophthalmic emulsion Administer 1 Drop to both eyes two (2) times a day.  benazepril (LOTENSIN) 40 mg tablet Take 40 mg by mouth daily.  ALPRAZolam (XANAX) 0.5 mg tablet Take 0.5 mg by mouth.  methylPREDNISolone (MEDROL DOSEPACK) 4 mg tablet Per dose pack instructions. 1 Dose Pack 1       Allergies   Allergen Reactions    Adhesive Tape-Silicones Other (comments)    Levaquin [Levofloxacin] Nausea and Vomiting         REVIEW OF SYSTEMS    Constitutional: Negative for fever, chills, or weight change. Respiratory: Negative for cough or shortness of breath. Cardiovascular: Negative for chest pain or palpitations. Gastrointestinal: Negative for acid reflux, change in bowel habits, or constipation. Genitourinary: Negative for dysuria and flank pain. Musculoskeletal: Positive for lumbar pain. Skin: Negative for rash.    Neurological: Negative for headaches, dizziness, or numbness. Endo/Heme/Allergies: Negative for increased bruising. Psychiatric/Behavioral: Negative for difficulty with sleep. PHYSICAL EXAMINATION  Visit Vitals  /68 (BP 1 Location: Left arm, BP Patient Position: Sitting)   Pulse 64   Temp 98.1 °F (36.7 °C) (Oral)   Resp 16   Ht 5' 3\" (1.6 m)   Wt 188 lb 9.6 oz (85.5 kg)   SpO2 97%   BMI 33.41 kg/m²       Constitutional: Awake, alert, and in no acute distress. Neurological: 1+ symmetrical DTRs in the upper extremities. 1+ symmetrical DTRs in the lower extremities. Sensation to light touch is intact. Negative Hart's sign bilaterally. Skin: warm, dry, and intact. Musculoskeletal: Tenderness to palpation in the lower lumbar region and over the sacroiliac joints. Pain with extension and axial loading. No pain with internal or external rotation of her hips. Positive straight leg raise bilaterally. Biceps  Triceps Deltoids Wrist Ext Wrist Flex Hand Intrin   Right +4/5 +4/5 +4/5 +4/5 +4/5 +4/5   Left +4/5 +4/5 +4/5 +4/5 +4/5 +4/5      Hip Flex  Quads Hamstrings Ankle DF EHL Ankle PF   Right +4/5 +4/5 +4/5 +4/5 +4/5 +4/5   Left +4/5 +4/5 +4/5 +4/5 +4/5 +4/5     IMAGING:    Lumbar spine 4V x-rays from 02/13/2020 were personally reviewed and demonstrated:  L2-5 fusion. Degenerative disc at L1-2. Severe degenerative disc at L5-S1. Degenerative facets. Atherosclerosis. No instability. Hardware appears intact.      Lumbar Spine MRI from 09/22/2016 was personally reviewed and demonstrated:   FINDINGS:  Scoliosis of the lumbar spine convex to the left. Grade 1 retrolisthesis L2 on  L3 and L3 on L4. Grade 1 anterior listhesis of L4 on L5. Degenerative discogenic  disease at all levels of the lumbar spine most prominent at L2-L3 where there is  moderate to severe disc space narrowing. Vertebral body heights are maintained. Marrow signal is within normal limits The conus medullaris terminates at  T12-L1.  No abnormal enhancement of the thecal sac on postcontrast images. Susceptibility artifact from posterior fusion L2-L5 somewhat limits evaluation  and causes distortion. 2.4 x 0.6 x 0.6 cm fluid collection at the right  laminotomy site at L3-L4. 6.5 cm in length by 1.6 x 1.3 cm lobular T2  hyperintense collection in the left posterior paraspinal soft tissues. There may  be additional small collection posterior to the left spinal canal at L3-L4. Diffuse edema and atrophy of the paraspinal musculature.      L1/2: The spinal canal and neural foramina are widely patent.      L2/3: Diffuse disc bulge and grade 1 retrolisthesis. Mild foraminal stenosis. Mild facet and ligamentous hypertrophy. Central canal is patent.      L3/4: Diffuse disc bulge and slight retrolisthesis. Central canal and neural  foramen are patent.      L4/5: Diffuse disc bulge. Central and neural foramen are patent.      L5/S1: Diffuse disc bulge. Mild/moderate facet arthropathy. Central canal is  patent. Mild left foraminal stenosis.      IMPRESSION:   Posterior fusion L2-L5. Central canal is patent at these levels.      Fluid collection at the right hemilaminotomy site and in the adjacent posterior  paraspinal musculature most likely representing postoperative fluid  collection/seroma, abscess is unlikely. There may be additional small fluid  posterior to the left the spinal canal at L3-L4.      Mild degenerative changes at L5-S1. Written by Naman Lancaster, as dictated by Isa Rahman MD.  I, Dr. Isa Rahman confirm that all documentation is accurate.

## 2020-02-13 ENCOUNTER — OFFICE VISIT (OUTPATIENT)
Dept: ORTHOPEDIC SURGERY | Age: 75
End: 2020-02-13

## 2020-02-13 VITALS
OXYGEN SATURATION: 97 % | HEART RATE: 64 BPM | HEIGHT: 63 IN | BODY MASS INDEX: 33.42 KG/M2 | DIASTOLIC BLOOD PRESSURE: 68 MMHG | SYSTOLIC BLOOD PRESSURE: 109 MMHG | TEMPERATURE: 98.1 F | WEIGHT: 188.6 LBS | RESPIRATION RATE: 16 BRPM

## 2020-02-13 DIAGNOSIS — R29.898 RIGHT LEG WEAKNESS: ICD-10-CM

## 2020-02-13 DIAGNOSIS — M54.41 ACUTE BILATERAL LOW BACK PAIN WITH RIGHT-SIDED SCIATICA: ICD-10-CM

## 2020-02-13 DIAGNOSIS — M35.3 PMR (POLYMYALGIA RHEUMATICA) (HCC): ICD-10-CM

## 2020-02-13 DIAGNOSIS — Z79.01 CHRONIC ANTICOAGULATION: ICD-10-CM

## 2020-02-13 DIAGNOSIS — M47.816 LUMBAR FACET ARTHROPATHY: ICD-10-CM

## 2020-02-13 DIAGNOSIS — M62.838 MUSCLE SPASM: ICD-10-CM

## 2020-02-13 DIAGNOSIS — M54.50 LUMBAR PAIN: Primary | ICD-10-CM

## 2020-02-13 RX ORDER — PREDNISONE 10 MG/1
TABLET ORAL
Qty: 32 TAB | Refills: 0 | Status: SHIPPED | OUTPATIENT
Start: 2020-02-13 | End: 2020-07-27 | Stop reason: ALTCHOICE

## 2020-02-13 RX ORDER — TRAMADOL HYDROCHLORIDE 50 MG/1
50 TABLET ORAL
Qty: 42 TAB | Refills: 0 | Status: SHIPPED | OUTPATIENT
Start: 2020-02-13 | End: 2020-02-20

## 2020-02-13 NOTE — LETTER
2/14/20 Patient: Arianna Avalos YOB: 1945 Date of Visit: 2/13/2020 Jennifer Lovett MD 
[address] [place] [address] VIA Facsimile: [phone] Dear Jennifer Lovett MD, Thank you for referring Ms. Arlin Shen to [facility] for evaluation. My notes for this consultation are attached. If you have questions, please do not hesitate to call me. I look forward to following your patient along with you. Sincerely, Naveed Scott MD

## 2020-02-13 NOTE — PATIENT INSTRUCTIONS
Low Back Arthritis: Exercises Introduction Here are some examples of typical rehabilitation exercises for your condition. Start each exercise slowly. Ease off the exercise if you start to have pain. Your doctor or physical therapist will tell you when you can start these exercises and which ones will work best for you. When you are not being active, find a comfortable position for rest. Some people are comfortable on the floor or a medium-firm bed with a small pillow under their head and another under their knees. Some people prefer to lie on their side with a pillow between their knees. Don't stay in one position for too long. Take short walks (10 to 20 minutes) every 2 to 3 hours. Avoid slopes, hills, and stairs until you feel better. Walk only distances you can manage without pain, especially leg pain. How to do the exercises Pelvic tilt 1. Lie on your back with your knees bent. 2. \"Brace\" your stomachtighten your muscles by pulling in and imagining your belly button moving toward your spine. 3. Press your lower back into the floor. You should feel your hips and pelvis rock back. 4. Hold for 6 seconds while breathing smoothly. 5. Relax and allow your pelvis and hips to rock forward. 6. Repeat 8 to 12 times. Back stretches 1. Get down on your hands and knees on the floor. 2. Relax your head and allow it to droop. Round your back up toward the ceiling until you feel a nice stretch in your upper, middle, and lower back. Hold this stretch for as long as it feels comfortable, or about 15 to 30 seconds. 3. Return to the starting position with a flat back while you are on your hands and knees. 4. Let your back sway by pressing your stomach toward the floor. Lift your buttocks toward the ceiling. 5. Hold this position for 15 to 30 seconds. 6. Repeat 2 to 4 times. Follow-up care is a key part of your treatment and safety.  Be sure to make and go to all appointments, and call your doctor if you are having problems. It's also a good idea to know your test results and keep a list of the medicines you take. Where can you learn more? Go to http://geovanna-tish.info/. Enter U546 in the search box to learn more about \"Low Back Arthritis: Exercises. \" Current as of: June 26, 2019 Content Version: 12.2 © 8008-0860 [employer]. Care instructions adapted under license by [employer] (which disclaims liability or warranty for this information). If you have questions about a medical condition or this instruction, always ask your healthcare professional. [address] any warranty or liability for your use of this information.

## 2020-02-24 ENCOUNTER — HOSPITAL ENCOUNTER (OUTPATIENT)
Age: 75
Discharge: HOME | End: 2020-02-24
Attending: PHYSICAL MEDICINE & REHABILITATION
Payer: MEDICARE

## 2020-02-24 DIAGNOSIS — M62.838 MUSCLE SPASM: ICD-10-CM

## 2020-02-24 DIAGNOSIS — M54.50 LUMBAR PAIN: ICD-10-CM

## 2020-02-24 DIAGNOSIS — M47.816 LUMBAR FACET ARTHROPATHY: ICD-10-CM

## 2020-02-24 DIAGNOSIS — M54.41 ACUTE BILATERAL LOW BACK PAIN WITH RIGHT-SIDED SCIATICA: ICD-10-CM

## 2020-02-24 DIAGNOSIS — R29.898 RIGHT LEG WEAKNESS: ICD-10-CM

## 2020-02-24 PROCEDURE — 72148 MRI LUMBAR SPINE W/O DYE: CPT

## 2020-02-27 ENCOUNTER — OFFICE VISIT (OUTPATIENT)
Dept: ORTHOPEDIC SURGERY | Age: 75
End: 2020-02-27

## 2020-02-27 VITALS
HEIGHT: 63 IN | RESPIRATION RATE: 16 BRPM | HEART RATE: 69 BPM | OXYGEN SATURATION: 97 % | SYSTOLIC BLOOD PRESSURE: 113 MMHG | DIASTOLIC BLOOD PRESSURE: 63 MMHG | BODY MASS INDEX: 33.1 KG/M2 | TEMPERATURE: 98.1 F | WEIGHT: 186.8 LBS

## 2020-02-27 DIAGNOSIS — G89.29 CHRONIC SACROILIAC PAIN: ICD-10-CM

## 2020-02-27 DIAGNOSIS — Z79.01 CHRONIC ANTICOAGULATION: ICD-10-CM

## 2020-02-27 DIAGNOSIS — M48.062 SPINAL STENOSIS OF LUMBAR REGION WITH NEUROGENIC CLAUDICATION: Primary | ICD-10-CM

## 2020-02-27 DIAGNOSIS — M47.816 LUMBAR FACET ARTHROPATHY: ICD-10-CM

## 2020-02-27 DIAGNOSIS — M54.16 LEFT LUMBAR RADICULOPATHY: ICD-10-CM

## 2020-02-27 DIAGNOSIS — M53.3 CHRONIC SACROILIAC PAIN: ICD-10-CM

## 2020-02-27 RX ORDER — PREGABALIN 75 MG/1
CAPSULE ORAL
Qty: 60 CAP | Refills: 1 | Status: SHIPPED | OUTPATIENT
Start: 2020-02-27 | End: 2021-09-23 | Stop reason: ALTCHOICE

## 2020-02-27 NOTE — PROGRESS NOTES
[facility]  [address] [place], [facility] [address]  Phone: [phone]  Fax: [phone]    Pt's YOB: 1945    ASSESSMENT   Diagnoses and all orders for this visit:    1. Spinal stenosis of lumbar region with neurogenic claudication  -     SCHEDULE SURGERY    2. Left lumbar radiculopathy  -     pregabalin (LYRICA) 75 mg capsule; Take 1 cap by mouth at night for 1 week, then increase to 2 as directed. -     SCHEDULE SURGERY    3. Lumbar facet arthropathy  -     SCHEDULE SURGERY    4. Chronic sacroiliac pain    5. Chronic anticoagulation         IMPRESSION AND PLAN:  Leena Henriquez is a 69 y.o. female with history of chronic lumbar pain and presents to the office today for MRI follow up. She notes that she is no longer experiencing sharp lower back pain but she does complain of pressure in the lower back with pain in the anterior aspect of the left thigh. Pt takes Cymbalta 60 mg 1 cap daily and notes sedation when taking Neurontin the past.       1) Pt was given information on lumbar arthritis exercises. 2) She was scheduled for a left L1-L2 SNRB. 3) Pt was prescribed Lyrica 75 mg 1 cap in the evening for 1 week, then increased to 2 as directed. 4) She is not a candidate for NSAID's due to chronic anticoagulation with Eliquis. 5) Ms. Anju Romero has a reminder for a \"due or due soon\" health maintenance. I have asked that she contact her primary care provider, Bessie Ryan MD, for follow-up on this health maintenance. 6)  demonstrated consistency with prescribing. Follow-up and Dispositions    · Return in about 6 weeks (around 4/9/2020) for Medication follow up, injection follow up. HISTORY OF PRESENT ILLNESS:  Leena Henriquez is a 69 y.o. female with history of chronic lumbar pain and presents to the office today for MRI follow up.  She notes that she is no longer experiencing sharp lower back pain but she does complain of pressure in the lower back. Pt also complains of pain in the anterior aspect of the left thigh and notes very occasional pain in the right leg. She reports difficulty finding a comfortable position in bed when turning side to side. Of note, she had a L2-3, L3-4, and L4/5 laminectomy TLIF by Dr. Ashly Morton on 2015. Pt takes Cymbalta 60 mg 1 cap daily. She admits to sedation when taking Neurontin the past but she denies ever taking Lyrica or Topamax. Pt notes that she took Pamelor in the past and she is currently on Eliquis. Pt at this time desires to proceed with medication evaluation and steroid injections. Pain Scale: 3/10    PCP: Yassine Welch MD     Past Medical History:   Diagnosis Date    Arthritis     Hypercholesterolemia     Hypertension     Ill-defined condition     osteopenia    Migraine     PMR [facility]ym[facility]algia rheumatica) ([facility])     Thyroid disease         Social History     Socioeconomic History    Marital status:      Spouse name: Not on file    Number of children: Not on file    Years of education: Not on file    Highest education level: Not on file   Occupational History    Not on file   Social Needs    Financial resource strain: Not on file    Food insecurity:     Worry: Not on file     Inability: Not on file    Transportation needs:     Medical: Not on file     Non-medical: Not on file   Tobacco Use    Smoking status: Former Smoker     Packs/day: 1.00     Years: 5.00     Pack years: 5.00     Last attempt to quit: 1970     Years since quittin.1    Smokeless tobacco: Never Used   Substance and Sexual Activity    Alcohol use:  Yes     Alcohol/week: 0.8 standard drinks     Types: 1 Glasses of wine per week     Comment: 4 nights a week    Drug use: No    Sexual activity: Not on file     Comment: Hysterectomy   Lifestyle    Physical activity:     Days per week: Not on file     Minutes per session: Not on file    Stress: Not on file   Relationships    Social connections:     Talks on phone: Not on file     Gets together: Not on file     Attends [employer] service: Not on file     Active member of club or organization: Not on file     Attends meetings of clubs or organizations: Not on file     Relationship status: Not on file    Intimate partner violence:     Fear of current or ex partner: Not on file     Emotionally abused: Not on file     Physically abused: Not on file     Forced sexual activity: Not on file   Other Topics Concern    Not on file   Social History Narrative    Not on file       Current Outpatient Medications   Medication Sig Dispense Refill    pregabalin (LYRICA) 75 mg capsule Take 1 cap by mouth at night for 1 week, then increase to 2 as directed. 60 Cap 1    predniSONE (DELTASONE) 10 mg tablet 6 pills Day 1, 5 pills Day 2, 4 pills Day 3&4, 3 pills Day 5&6, 2 pills Day 7&8, 1 pill Day 9&10, 1/2 pill Day 11&12 32 Tab 0    methylPREDNISolone (MEDROL DOSEPACK) 4 mg tablet Per dose pack instructions. 1 Dose Pack 1    buPROPion XL (WELLBUTRIN XL) 150 mg tablet Take 150 mg by mouth daily.  thyroid, Pork, (ARMOUR THYROID) 60 mg tablet Take 60 mg by mouth daily. Plus 50 mg qdaily      diclofenac (FLECTOR) 1.3 % pt12 1 Patch by TransDERmal route every twelve (12) hours every twelve (12) hours. Apply to left hip. 60 Patch 5    apixaban (ELIQUIS) 5 mg tablet Take 5 mg by mouth two (2) times a day.  flecainide acetate (FLECAINIDE PO) Take  by mouth two (2) times a day.  predniSONE (DELTASONE) 1 mg tablet Take 3 mg by mouth daily.  DULoxetine (CYMBALTA) 60 mg capsule Take 60 mg by mouth daily.  acetaminophen (TYLENOL EXTRA STRENGTH) 500 mg tablet Take  by mouth every six (6) hours as needed for Pain.  metoprolol succinate (TOPROL-XL) 25 mg XL tablet Take 25 mg by mouth daily. Take 1 tab po q am, take 1/2 tab po q pm  0    rosuvastatin (CRESTOR) 20 mg tablet Take 20 mg by mouth nightly.       cycloSPORINE (RESTASIS) 0.05 % ophthalmic emulsion Administer 1 Drop to both eyes two (2) times a day.  benazepril (LOTENSIN) 40 mg tablet Take 40 mg by mouth daily.  ALPRAZolam (XANAX) 0.5 mg tablet Take 0.5 mg by mouth. Allergies   Allergen Reactions    Adhesive Tape-Silicones Other (comments)    Levaquin [Levofloxacin] Nausea and Vomiting         REVIEW OF SYSTEMS    Constitutional: Negative for fever, chills, or weight change. Respiratory: Negative for cough or shortness of breath. Cardiovascular: Negative for chest pain or palpitations. Gastrointestinal: Negative for acid reflux, change in bowel habits, or constipation. Genitourinary: Negative for dysuria and flank pain. Musculoskeletal: Positive for lumbar pain. Skin: Negative for rash. Neurological: Negative for headaches, dizziness, or numbness. Endo/Heme/Allergies: Negative for increased bruising. Psychiatric/Behavioral: Negative for difficulty with sleep. PHYSICAL EXAMINATION  Visit Vitals  /63 (BP 1 Location: Left arm, BP Patient Position: Sitting)   Pulse 69   Temp 98.1 °F (36.7 °C) (Oral)   Resp 16   Ht 5' 3\" (1.6 m)   Wt 186 lb 12.8 oz (84.7 kg)   SpO2 97%   BMI 33.09 kg/m²       Constitutional: Awake, alert, and in no acute distress. Neurological: 1+ symmetrical DTRs in the lower extremities. Sensation to light touch is intact. Skin: warm, dry, and intact. Musculoskeletal: Tenderness to palpation in the lower lumbar region and over the sacroiliac joints. Pain with extension and axial loading. No pain with internal or external rotation of her hips. Positive straight leg raise bilaterally.      Hip Flex  Quads Hamstrings Ankle DF EHL Ankle PF   Right +4/5 +4/5 +4/5 +4/5 +4/5 +4/5   Left +4/5 +4/5 +4/5 +4/5 +4/5 +4/5     IMAGING:    Lumbar spine MRI from 02/24/2020 was personally reviewed with the patient and demonstrated:  Results from [place] encounter on 02/24/20   MRI LUMB SPINE WO CONT    Narrative MR lumbar spine without contrast    HISTORY: Chronic lumbar pain, acute bilateral low back pain with right-sided  sciatica, right leg weakness, previous lumbar fusion. COMPARISON: MRI 9/25/2015    TECHNIQUE: Lumbar spine scanned with axial and sagittal T1W scans, sagittal  STIR, axial and sagittal T2W scans. FINDINGS: L2-L5 posterolateral fusion. Stable grade 1 anterolisthesis of L4 on  L5. Mild dextroscoliosis. Normal vertebral body heights. Heterogeneous marrow  signal on the basis of endplate degenerative changes. L1 vertebral hemangioma. The conus medullaris is normal in signal and caliber ending at the T12-L1 disc  level. No clumping of the cauda equina nerve roots. Postoperative changes in the  paraspinal soft tissues. No fluid collection or mass. Spinal canal and neural foramen: Limited visualization due to hardware related  artifact. Bulging disc and osteophyte complexes at T9-10 and T10-11 without  significant spinal canal stenosis. T12-L1: Tiny left central disc protrusion, new. No spinal canal or foraminal  stenosis. L1-2: Significant worsening of degenerative disc and facet disease. Disc is  narrowed with bulging disc and osteophyte complex. Superimposed left central  disc protrusion. Moderate facet and ligamentum flavum hypertrophy with trace  bilateral facet effusions. Mild to moderate spinal canal and right greater than  left foraminal stenoses. No exiting nerve root compression. L2-3: Similar disc space narrowing with disc osteophyte complex and facet  arthropathy with ligamentum flavum thickening. Mild spinal canal and foraminal  stenoses, stable. L3-4: Similar disc space narrowing. Effective decompression of the spinal canal  with no residual stenosis. Widely patent left neural foramen but there is  similar degree of moderate right foraminal stenosis. L4-5: Similar disc space narrowing and disc bulge. Spinal canal is effectively  decompressed with no significant residual stenosis.  Mild foraminal stenoses  without nerve root compression, stable. L5-S1: Disc bulge and facet hypertrophy. No spinal canal stenosis. Stable mild  to moderate left foraminal stenosis. Impression IMPRESSION:    1. Interval worsening of degenerative spinal disease most prominent at L1-2  where there is disc osteophyte complex and superimposed left central disc  protrusion producing mild to moderate spinal canal stenosis and right greater  than left foraminal stenoses. 2.  New tiny left central disc protrusion at T12-L1 without spinal stenosis. 3.  Effective decompression of the L3-4 and L4-5 spinal canal with mild residual  spinal stenosis at L2-3. Written by Benjamin Ron, as dictated by Yolis Sultana MD.  I, Dr. Yolis Sultana confirm that all documentation is accurate.

## 2020-02-27 NOTE — LETTER
2/28/20 Patient: Laura Brennan YOB: 1945 Date of Visit: 2/27/2020 Chrisandra Phalen, MD 
[address] [place] [address] VIA Facsimile: [phone] Dear Chrisandra Phalen, MD, Thank you for referring Ms. Kenyetta Aragon to [facility] for evaluation. My notes for this consultation are attached. If you have questions, please do not hesitate to call me. I look forward to following your patient along with you. Sincerely, Carlos Chinchilla MD

## 2020-02-27 NOTE — PATIENT INSTRUCTIONS
Low Back Arthritis: Exercises Introduction Here are some examples of typical rehabilitation exercises for your condition. Start each exercise slowly. Ease off the exercise if you start to have pain. Your doctor or physical therapist will tell you when you can start these exercises and which ones will work best for you. When you are not being active, find a comfortable position for rest. Some people are comfortable on the floor or a medium-firm bed with a small pillow under their head and another under their knees. Some people prefer to lie on their side with a pillow between their knees. Don't stay in one position for too long. Take short walks (10 to 20 minutes) every 2 to 3 hours. Avoid slopes, hills, and stairs until you feel better. Walk only distances you can manage without pain, especially leg pain. How to do the exercises Pelvic tilt 1. Lie on your back with your knees bent. 2. \"Brace\" your stomachtighten your muscles by pulling in and imagining your belly button moving toward your spine. 3. Press your lower back into the floor. You should feel your hips and pelvis rock back. 4. Hold for 6 seconds while breathing smoothly. 5. Relax and allow your pelvis and hips to rock forward. 6. Repeat 8 to 12 times. Back stretches 1. Get down on your hands and knees on the floor. 2. Relax your head and allow it to droop. Round your back up toward the ceiling until you feel a nice stretch in your upper, middle, and lower back. Hold this stretch for as long as it feels comfortable, or about 15 to 30 seconds. 3. Return to the starting position with a flat back while you are on your hands and knees. 4. Let your back sway by pressing your stomach toward the floor. Lift your buttocks toward the ceiling. 5. Hold this position for 15 to 30 seconds. 6. Repeat 2 to 4 times. Follow-up care is a key part of your treatment and safety.  Be sure to make and go to all appointments, and call your doctor if you are having problems. It's also a good idea to know your test results and keep a list of the medicines you take. Where can you learn more? Go to http://geovanna-tish.info/. Enter I263 in the search box to learn more about \"Low Back Arthritis: Exercises. \" Current as of: June 26, 2019 Content Version: 12.2 © 9798-1633 [employer]. Care instructions adapted under license by [employer] (which disclaims liability or warranty for this information). If you have questions about a medical condition or this instruction, always ask your healthcare professional. [address] any warranty or liability for your use of this information.

## 2020-03-02 ENCOUNTER — TELEPHONE (OUTPATIENT)
Dept: ORTHOPEDIC SURGERY | Age: 75
End: 2020-03-02

## 2020-03-02 NOTE — TELEPHONE ENCOUNTER
P/A submitted via cover my meds website for patient's Lyrica medication. Used key code: [id number]  And notes from previous office visits to submit prior authorization. Form printed and sent to scanning.     Obtained following message from website:    Salina Hardwick (Key: [id number]) - [id number]  Pregabalin 75MG capsules  Status: PA Response - Approved    Created: March 2nd, 2020 [phone]    Sent: March 2nd, 2020

## 2020-03-02 NOTE — TELEPHONE ENCOUNTER
Called and spoke with Doreen Cano at [facility]. Informed of below message. Doreen Cano verbalized agreement/understanding. Per Doreen Cano they have gotten a paid claim for medication, will begin to fill medication now. No further action required at this time.

## 2020-03-02 NOTE — TELEPHONE ENCOUNTER
PA is required for Pregabalin 75mg    Cover My Meds Key:  [employer] Key: [id number] - PA Case ID: [id number]

## 2020-03-05 ENCOUNTER — DOCUMENTATION ONLY (OUTPATIENT)
Dept: ORTHOPEDIC SURGERY | Age: 75
End: 2020-03-05

## 2020-03-18 ENCOUNTER — HOSPITAL ENCOUNTER (OUTPATIENT)
Age: 75
Setting detail: OUTPATIENT SURGERY
Discharge: HOME | End: 2020-03-18
Attending: PHYSICAL MEDICINE & REHABILITATION | Admitting: PHYSICAL MEDICINE & REHABILITATION
Payer: MEDICARE

## 2020-03-18 ENCOUNTER — APPOINTMENT (OUTPATIENT)
Dept: GENERAL RADIOLOGY | Age: 75
End: 2020-03-18
Attending: PHYSICAL MEDICINE & REHABILITATION
Payer: MEDICARE

## 2020-03-18 VITALS
HEART RATE: 67 BPM | RESPIRATION RATE: 18 BRPM | OXYGEN SATURATION: 94 % | DIASTOLIC BLOOD PRESSURE: 76 MMHG | SYSTOLIC BLOOD PRESSURE: 139 MMHG | TEMPERATURE: 97.9 F

## 2020-03-18 PROCEDURE — 76010000009 HC PAIN MGT 0 TO 30 MIN PROC: Performed by: PHYSICAL MEDICINE & REHABILITATION

## 2020-03-18 PROCEDURE — 74011000250 HC RX REV CODE- 250: Performed by: PHYSICAL MEDICINE & REHABILITATION

## 2020-03-18 PROCEDURE — 77030003669 HC NDL SPN COOK -B: Performed by: PHYSICAL MEDICINE & REHABILITATION

## 2020-03-18 PROCEDURE — 77030003676 HC NDL SPN MPRI -A: Performed by: PHYSICAL MEDICINE & REHABILITATION

## 2020-03-18 PROCEDURE — 77030039433 HC TY MYLEOGRAM BD -B: Performed by: PHYSICAL MEDICINE & REHABILITATION

## 2020-03-18 PROCEDURE — 74011636320 HC RX REV CODE- 636/320: Performed by: PHYSICAL MEDICINE & REHABILITATION

## 2020-03-18 PROCEDURE — 74011250636 HC RX REV CODE- 250/636: Performed by: PHYSICAL MEDICINE & REHABILITATION

## 2020-03-18 RX ORDER — LIDOCAINE HYDROCHLORIDE 10 MG/ML
INJECTION, SOLUTION EPIDURAL; INFILTRATION; INTRACAUDAL; PERINEURAL AS NEEDED
Status: DISCONTINUED | OUTPATIENT
Start: 2020-03-18 | End: 2020-03-18 | Stop reason: HOSPADM

## 2020-03-18 RX ORDER — DIAZEPAM 5 MG/1
5-20 TABLET ORAL ONCE
Status: DISCONTINUED | OUTPATIENT
Start: 2020-03-18 | End: 2020-03-18 | Stop reason: HOSPADM

## 2020-03-18 RX ORDER — DEXAMETHASONE SODIUM PHOSPHATE 100 MG/10ML
INJECTION INTRAMUSCULAR; INTRAVENOUS AS NEEDED
Status: DISCONTINUED | OUTPATIENT
Start: 2020-03-18 | End: 2020-03-18 | Stop reason: HOSPADM

## 2020-03-18 NOTE — DISCHARGE INSTRUCTIONS
[facility]   ([facility])  Dr. Arben Mg, Dr. Palmira Barrera, Dr. Mariel Youngblood not drive a car, operate heavy machinery or dangerous equipment for 24 hours. * Activity as tolerated; rest for the remainder of the day. * Resume pre-block medications including those for your family doctor. * Do not drink alcoholic beverages for 24 hours. Alcohol and the medications you have received may interact and cause an adverse reaction. * You may feel better this evening and worse tomorrow, as the numbing medications wears off and the steroid has yet to begin to work. After 48 hrs the steroid should begin to release bringing you relief. * You may shower this evening and remove any bandages. * Avoid hot tubs and heating pads for 24 hours. You may use cold packs on the procedure site as tolerated for the first 24 hours. * If a headache develops, drink plenty of fluids and rest.  Take over the counter medications for headache if needed. If the headache continues longer than 24 hours, call MD at the [address] [place] [address]. [phone]    * Continue taking pain medications as needed. * You may resume your regular diet if tolerated. Otherwise, start with sips of water and advance slowly. * If Diabetic: check your blood sugar three times a day for the next 3 days. If your sugar is greater than 300 call your family doctor. If your sugar is greater than 400, have someone transport you to the nearest Emergency Room. * If you experience any of the following problems, Please Call the [address] [place] [address] at [phone].         * Shortness of Breath    * Fever of 101 or higher    * Nausea / Vomiting    * Severe Headache    * Weakness or numbness in arms or legs that is not      resolving    * Prolonged increase in pain greater than 4 days      DISCHARGE SUMMARY from Nurse      PATIENT INSTRUCTIONS:    After oral sedation, for 24 hours or while taking prescription Narcotics:  · Limit your activities  · Do not drive and operate hazardous machinery  · Do not make important personal or business decisions  · Do  not drink alcoholic beverages  · If you have not urinated within 8 hours after discharge, please contact your surgeon on call. Report the following to your surgeon:  · Excessive pain, swelling, redness or odor of or around the surgical area  · Temperature over 101  · Nausea and vomiting lasting longer than 4 hours or if unable to take medications  · Any signs of decreased circulation or nerve impairment to extremity: change in color, persistent  numbness, tingling, coldness or increase pain  · Any questions            What to do at Home:  Recommended activity: Activity as tolerated, NO DRIVING FOR 12 Hours post injection          *  Please give a list of your current medications to your Primary Care Provider. *  Please update this list whenever your medications are discontinued, doses are      changed, or new medications (including over-the-counter products) are added. *  Please carry medication information at all times in case of emergency situations. These are general instructions for a healthy lifestyle:    No smoking/ No tobacco products/ Avoid exposure to second hand smoke    Surgeon General's Warning:  Quitting smoking now greatly reduces serious risk to your health. Obesity, smoking, and sedentary lifestyle greatly increases your risk for illness    A healthy diet, regular physical exercise & weight monitoring are important for maintaining a healthy lifestyle    You may be retaining fluid if you have a history of heart failure or if you experience any of the following symptoms:  Weight gain of 3 pounds or more overnight or 5 pounds in a week, increased swelling in our hands or feet or shortness of breath while lying flat in bed.   Please call your doctor as soon as you notice any of these symptoms; do not wait until your next office visit. Recognize signs and symptoms of STROKE:    F-face looks uneven    A-arms unable to move or move unevenly    S-speech slurred or non-existent    T-time-call 911 as soon as signs and symptoms begin-DO NOT go       Back to bed or wait to see if you get better-TIME IS BRAIN.

## 2020-03-18 NOTE — H&P
Date of Surgery Update:  Brittany Carpenter was seen and examined. History and physical has been reviewed. The patient has been examined. There have been no significant clinical changes since the last office visit.       Signed By: Daisy Lassiter MD     March 18, 2020 12:52 PM

## 2020-03-18 NOTE — PROCEDURES
PROCEDURE NOTE      Patient Name: Lasandra Moritz    Date of Procedure: March 18, 2020    Preoperative Diagnosis:  SPINAL STENOSIS/RADICULOPATHY    Post Operative Diagnosis:  SPINAL STENOSIS/RADICULOPATHY    Procedure :    left L1 Selective Nerve Root Block  left L2 Selective Nerve Root Block    Consent:  Informed consent was obtained prior to the procedure. The patient was given the opportunity to ask questions regarding the procedure and its associated risks. In addition to the potential risks associated with the procedure itself, the patient was informed both verbally and in writing of the potential side effects of the use of glucocorticoid. The patient appeared to comprehend the informed consent and desired to have the procedure performed. Procedure: The patient was placed in the prone position on the fluoroscopy table and the back was prepped and draped in the usual sterile manner. The exact spinal level was  identified using fluoroscopy, and Lidocaine 1 % was injected locally, a # 22 gauge spinal needle was passed to the transverse process. The depth was noted and the needle redirected to pass inferior and approximately one cm anterior to the transverse process. YES  1 cc of Isovue M-200 was used to verify positioning in the epidural and paravertebral space and outlined the course of the spinal nerve into the epidural space. The same procedure was repeated at each spinal level indicated above. A total of 10 mg of preservative free Dexamethasone and 5 cc of Lidocaine was slowly injected. The patient tolerated the procedure well. The injection area was cleaned and bandaids applied. Not excessive bleeding was noted. Patient dressed and discharged to home with instructions. Discussion: The patient tolerated the procedure well.                                               Pastora Gutierrez MD  March 18, 2020

## 2020-04-02 ENCOUNTER — VIRTUAL VISIT (OUTPATIENT)
Dept: ORTHOPEDIC SURGERY | Age: 75
End: 2020-04-02

## 2020-04-02 DIAGNOSIS — Z79.01 CHRONIC ANTICOAGULATION: ICD-10-CM

## 2020-04-02 DIAGNOSIS — M62.838 MUSCLE SPASM: ICD-10-CM

## 2020-04-02 DIAGNOSIS — M54.41 ACUTE BILATERAL LOW BACK PAIN WITH RIGHT-SIDED SCIATICA: ICD-10-CM

## 2020-04-02 DIAGNOSIS — M48.062 SPINAL STENOSIS OF LUMBAR REGION WITH NEUROGENIC CLAUDICATION: ICD-10-CM

## 2020-04-02 DIAGNOSIS — M54.16 LEFT LUMBAR RADICULOPATHY: Primary | ICD-10-CM

## 2020-04-02 NOTE — PROGRESS NOTES
[facility]  [address] [place], [address]  Phone: [phone]  Fax: [phone]    Pt's YOB: 1945    ASSESSMENT   Diagnoses and all orders for this visit:    1. Left lumbar radiculopathy    2. Spinal stenosis of lumbar region with neurogenic claudication    3. Acute bilateral low back pain with right-sided sciatica    4. Muscle spasm    5. Chronic anticoagulation         IMPRESSION AND PLAN:  Andrade Benjamin is a 69 y.o. female with history of chronic lumbar pain. Pt notes significant improvement with a left L1 and left L2 SNRB on 03/18/2020 and notes that she has not experienced any pain since the procedure. 1) Pt was given information on lumbar arthritis exercises. 2) Ms. Justin Santana has a reminder for a \"due or due soon\" health maintenance. I have asked that she contact her primary care provider, Abbi Rivera MD, for follow-up on this health maintenance. 3)  demonstrated consistency with prescribing. 4) Pt is not taking any neuropathic medication at this time but may resume this if needed   Follow-up and Dispositions    · Return in about 2 months (around 6/2/2020) for virtual visit . CPT Codes 27492-16381 for Established Patients may apply to this TeleHealth Visit  Time-based coding, delete if not needed: I spent at least 15 minutes with this established patient, and >50% of the time was spent counseling and/or coordinating care regarding her injections and medications. Due to this being a TeleHealth evaluation, many elements of the physical examination are unable to be assessed.      Pursuant to the emergency declaration under the [facility] waiver authority and the [employer] Act, this Virtual  Visit was conducted, with patient's consent, to reduce the patient's risk of exposure to COVID-19 and provide continuity of care for an established patient. Services were provided through a video synchronous discussion virtually to substitute for in-person clinic visit. HISTORY OF PRESENT ILLNESS:  Misa Bhatia is a 69 y.o. female with history of chronic lumbar pain and is seen from her home by synchronous (real-time) audio-video technology via doxy. me on 4/2/2020 with her verbal consent for follow up. Pt notes significant improvement with a left L1 and left L2 SNRB on 03/18/2020 and notes that she has not experienced any pain since the procedure. She started Lyrica 75 mg, increased to 2 caps QHS, but then discontinued the medication since she did not tolerate it. Pt notes that she has also discontinued her Wellbutrin and Cymbalta and overall she is feeling great at this time. She also has been able to be more active around her house and was cautioned about \"overdoing it\" as she feels so much better. She denies any new concerns and is quite pleased with how much better she feels. Pt at this time desires to continue with current care.     Pain Scale: /10    PCP: Sharlene Mcnamara MD     Past Medical History:   Diagnosis Date    Arthritis     Hypercholesterolemia     Hypertension     Ill-defined condition     osteopenia    Migraine     PMR [facility]ym[facility]algia rhe[facility] ([facility]     Thyroid disease         Social History     Socioeconomic History    Marital status:      Spouse name: Not on file    Number of children: Not on file    Years of education: Not on file    Highest education level: Not on file   Occupational History    Not on file   Social Needs    Financial resource strain: Not on file    Food insecurity     Worry: Not on file     Inability: Not on file    Transportation needs     Medical: Not on file     Non-medical: Not on file   Tobacco Use    Smoking status: Former Smoker     Packs/day: 1.00     Years: 5.00     Pack years: 5.00     Last attempt to quit: 1/5/1970     Years since quittin.2    Smokeless tobacco: Never Used   Substance and Sexual Activity    Alcohol use: Yes     Alcohol/week: 0.8 standard drinks     Types: 1 Glasses of wine per week     Comment: 4 nights a week    Drug use: No    Sexual activity: Not on file     Comment: Hysterectomy   Lifestyle    Physical activity     Days per week: Not on file     Minutes per session: Not on file    Stress: Not on file   Relationships    Social connections     Talks on phone: Not on file     Gets together: Not on file     Attends Faith service: Not on file     Active member of club or organization: Not on file     Attends meetings of clubs or organizations: Not on file     Relationship status: Not on file    Intimate partner violence     Fear of current or ex partner: Not on file     Emotionally abused: Not on file     Physically abused: Not on file     Forced sexual activity: Not on file   Other Topics Concern    Not on file   Social History Narrative    Not on file       Current Outpatient Medications   Medication Sig Dispense Refill    pregabalin (LYRICA) 75 mg capsule Take 1 cap by mouth at night for 1 week, then increase to 2 as directed. 60 Cap 1    predniSONE (DELTASONE) 10 mg tablet 6 pills Day 1, 5 pills Day 2, 4 pills Day 3&4, 3 pills Day 5&6, 2 pills Day 7&8, 1 pill Day 9&10, 1/2 pill Day 11&12 32 Tab 0    methylPREDNISolone (MEDROL DOSEPACK) 4 mg tablet Per dose pack instructions. 1 Dose Pack 1    buPROPion XL (WELLBUTRIN XL) 150 mg tablet Take 150 mg by mouth daily.  thyroid, Pork, (ARMOUR THYROID) 60 mg tablet Take 60 mg by mouth daily. Plus 50 mg qdaily      diclofenac (FLECTOR) 1.3 % pt12 1 Patch by TransDERmal route every twelve (12) hours every twelve (12) hours. Apply to left hip. 60 Patch 5    apixaban (ELIQUIS) 5 mg tablet Take 5 mg by mouth two (2) times a day.  flecainide acetate (FLECAINIDE PO) Take  by mouth two (2) times a day.       predniSONE (DELTASONE) 1 mg tablet Take 3 mg by mouth daily.  DULoxetine (CYMBALTA) 60 mg capsule Take 60 mg by mouth daily.  acetaminophen (TYLENOL EXTRA STRENGTH) 500 mg tablet Take  by mouth every six (6) hours as needed for Pain.  metoprolol succinate (TOPROL-XL) 25 mg XL tablet Take 25 mg by mouth daily. Take 1 tab po q am, take 1/2 tab po q pm  0    rosuvastatin (CRESTOR) 20 mg tablet Take 20 mg by mouth nightly.  cycloSPORINE (RESTASIS) 0.05 % ophthalmic emulsion Administer 1 Drop to both eyes two (2) times a day.  benazepril (LOTENSIN) 40 mg tablet Take 40 mg by mouth daily.  ALPRAZolam (XANAX) 0.5 mg tablet Take 0.5 mg by mouth. Allergies   Allergen Reactions    Adhesive Tape-Silicones Other (comments)    Levaquin [Levofloxacin] Nausea and Vomiting         REVIEW OF SYSTEMS    Constitutional: Negative for fever, chills, or weight change. Musculoskeletal: Positive for lumbar pain at times. Neurological: Negative for headaches, dizziness, or numbness. Psychiatric/Behavioral: Negative for difficulty with sleep. As per HPI    IMAGING:    Lumbar spine MRI from 02/24/2020 was personally reviewed with the patient and demonstrated:       Results from [place] encounter on 02/24/20   MRI LUMB SPINE WO CONT     Narrative MR lumbar spine without contrast     HISTORY: Chronic lumbar pain, acute bilateral low back pain with right-sided  sciatica, right leg weakness, previous lumbar fusion.     COMPARISON: MRI 9/25/2015     TECHNIQUE: Lumbar spine scanned with axial and sagittal T1W scans, sagittal  STIR, axial and sagittal T2W scans.     FINDINGS: L2-L5 posterolateral fusion. Stable grade 1 anterolisthesis of L4 on  L5. Mild dextroscoliosis. Normal vertebral body heights. Heterogeneous marrow  signal on the basis of endplate degenerative changes. L1 vertebral hemangioma. The conus medullaris is normal in signal and caliber ending at the T12-L1 disc  level. No clumping of the cauda equina nerve roots. Postoperative changes in the  paraspinal soft tissues. No fluid collection or mass.     Spinal canal and neural foramen: Limited visualization due to hardware related  artifact. Bulging disc and osteophyte complexes at T9-10 and T10-11 without  significant spinal canal stenosis.      T12-L1: Tiny left central disc protrusion, new. No spinal canal or foraminal  stenosis.     L1-2: Significant worsening of degenerative disc and facet disease. Disc is  narrowed with bulging disc and osteophyte complex. Superimposed left central  disc protrusion. Moderate facet and ligamentum flavum hypertrophy with trace  bilateral facet effusions. Mild to moderate spinal canal and right greater than  left foraminal stenoses. No exiting nerve root compression.     L2-3: Similar disc space narrowing with disc osteophyte complex and facet  arthropathy with ligamentum flavum thickening. Mild spinal canal and foraminal  stenoses, stable.     L3-4: Similar disc space narrowing. Effective decompression of the spinal canal  with no residual stenosis. Widely patent left neural foramen but there is  similar degree of moderate right foraminal stenosis.     L4-5: Similar disc space narrowing and disc bulge. Spinal canal is effectively  decompressed with no significant residual stenosis. Mild foraminal stenoses  without nerve root compression, stable.      L5-S1: Disc bulge and facet hypertrophy. No spinal canal stenosis. Stable mild  to moderate left foraminal stenosis.       Impression IMPRESSION:     1. Interval worsening of degenerative spinal disease most prominent at L1-2  where there is disc osteophyte complex and superimposed left central disc  protrusion producing mild to moderate spinal canal stenosis and right greater  than left foraminal stenoses. 2.  New tiny left central disc protrusion at T12-L1 without spinal stenosis.   3.  Effective decompression of the L3-4 and L4-5 spinal canal with mild residual  spinal stenosis at L2-3.    Written by Valeria Green, as dictated by Iliana Donaldson MD.  I, Dr. Iliana Donaldson confirm that all documentation is accurate.

## 2020-06-01 ENCOUNTER — OFFICE VISIT (OUTPATIENT)
Dept: ORTHOPEDIC SURGERY | Age: 75
End: 2020-06-01

## 2020-06-01 VITALS
OXYGEN SATURATION: 98 % | WEIGHT: 199.8 LBS | HEART RATE: 66 BPM | DIASTOLIC BLOOD PRESSURE: 56 MMHG | SYSTOLIC BLOOD PRESSURE: 121 MMHG | RESPIRATION RATE: 20 BRPM | TEMPERATURE: 98.6 F | HEIGHT: 63 IN | BODY MASS INDEX: 35.4 KG/M2

## 2020-06-01 DIAGNOSIS — M53.3 SACROILIAC JOINT DYSFUNCTION: ICD-10-CM

## 2020-06-01 DIAGNOSIS — Z79.01 CHRONIC ANTICOAGULATION: ICD-10-CM

## 2020-06-01 DIAGNOSIS — M48.062 SPINAL STENOSIS OF LUMBAR REGION WITH NEUROGENIC CLAUDICATION: ICD-10-CM

## 2020-06-01 DIAGNOSIS — E66.01 SEVERE OBESITY (HCC): ICD-10-CM

## 2020-06-01 DIAGNOSIS — M47.816 LUMBAR FACET ARTHROPATHY: ICD-10-CM

## 2020-06-01 DIAGNOSIS — M54.16 LEFT LUMBAR RADICULOPATHY: Primary | ICD-10-CM

## 2020-06-01 DIAGNOSIS — G89.29 OTHER CHRONIC PAIN: ICD-10-CM

## 2020-06-01 DIAGNOSIS — M62.838 MUSCLE SPASM: ICD-10-CM

## 2020-06-01 RX ORDER — DICLOFENAC EPOLAMINE 0.01 G/1
1 PATCH TOPICAL EVERY 12 HOURS
Qty: 60 PATCH | Refills: 5 | Status: CANCELLED | OUTPATIENT
Start: 2020-06-01

## 2020-06-01 RX ORDER — LIDOCAINE 50 MG/G
PATCH TOPICAL
Qty: 60 PATCH | Refills: 2 | Status: SHIPPED | OUTPATIENT
Start: 2020-06-01 | End: 2020-11-30

## 2020-06-01 NOTE — PROGRESS NOTES
[facility]  [address] [place], [address]  Phone: [phone]  Fax: [phone]    Pt's YOB: 1945    ASSESSMENT   Diagnoses and all orders for this visit:    1. Left lumbar radiculopathy  -     lidocaine (LIDODERM) 5 %; Apply patch to the affected area for 12 hours a day and remove for 12 hours a day. 2. Spinal stenosis of lumbar region with neurogenic claudication  -     SCHEDULE SURGERY  -     lidocaine (LIDODERM) 5 %; Apply patch to the affected area for 12 hours a day and remove for 12 hours a day. 3. Muscle spasm  -     SCHEDULE SURGERY    4. Lumbar facet arthropathy  -     SCHEDULE SURGERY  -     lidocaine (LIDODERM) 5 %; Apply patch to the affected area for 12 hours a day and remove for 12 hours a day. 5. Other chronic pain  -     lidocaine (LIDODERM) 5 %; Apply patch to the affected area for 12 hours a day and remove for 12 hours a day. 6. Chronic anticoagulation    7. Severe obesity ([address]    8. Sacroiliac joint dysfunction         IMPRESSION AND PLAN:  Johann Santos is a 69 y.o. female with history of chronic lumbar pain. She reports pressure and pain across the lower back but denies any pain radiating down the legs at this time. Pt notes that she had flare in her polymyalgia rheumatica so she is currently on prednisone. She tried Lyrica 75 mg 2 caps QHS but did not tolerate the medication secondary to brain fog. Pt notes relief when using Lidoderm patches. 1) Pt was given information on lumbar arthritis and sacroiliac exercises. 2) She was scheduled for an L5 facet injection. 3) Pt is not a candidate for NSAID's due to chronic anticoagulation with Eliquis. 4) She received a refill of Lidoderm patches. 5) I encouraged the patient to lose weight and resume water exercise when the [place] reopens. 6) Ms. Diana Carvajal has a reminder for a \"due or due soon\" health maintenance.  I have asked that she contact her primary care provider, Dai Wakefield MD, for follow-up on this health maintenance. 7)  demonstrated consistency with prescribing. Follow-up and Dispositions    · Return in about 4 weeks (around 6/29/2020). HISTORY OF PRESENT ILLNESS:  Washington Gar is a 69 y.o. female with history of chronic lumbar pain and presents to the office today for follow up. Pt notes significant temporary improvement with a left L1 and left L2 SNRB on 03/18/2020. She reports pressure and pain across the lower back but denies any pain radiating down the legs at this time. Pt also complains of difficulty changing positions in bed secondary to pain. She admits that she often feels as if she has to lean forward and she ambulates with the assistance of a single point cane. Pt admits that she injured her left foot since her last office visit and she presents to the office today wearing a left ankle support. According to the patient, she had arthritis and tendonitis in the left foot. She states that an orthotic has been ordered but she is waiting on insurance coverage to be fitted. Pt notes that she had flare in her polymyalgia rheumatica so she is currently on elevated doses of prednisone. She admits that she has been less active with the PMR, and has gained weight about 10 lbs with COVID-19 and her inability to perform water exercise with the [employer] closed. Pt tried Lyrica 75 mg 2 caps QHS but did not tolerate the medication secondary to brain fog. She reports relief when using Lidoderm patches. Of note. She is currently on Eliquis. Pt had previous surgery with Dr. Elizabeth Paulson but admits that she is hesitant since she did not have a good experience previously with [facility]. Pt at this time desires to proceed with steroid injections.     Pain Scale: 6/10    PCP: Dai Wakefield MD     Past Medical History:   Diagnosis Date    Arthritis     Hypercholesterolemia     Hypertension     Ill-defined condition     osteopenia    Migraine     [facility]     Thyroid disease         Social History     Socioeconomic History    Marital status:      Spouse name: Not on file    Number of children: Not on file    Years of education: Not on file    Highest education level: Not on file   Occupational History    Not on file   Social Needs    Financial resource strain: Not on file    Food insecurity     Worry: Not on file     Inability: Not on file    Transportation needs     Medical: Not on file     Non-medical: Not on file   Tobacco Use    Smoking status: Former Smoker     Packs/day: 1.00     Years: 5.00     Pack years: 5.00     Last attempt to quit: 1970     Years since quittin.4    Smokeless tobacco: Never Used   Substance and Sexual Activity    Alcohol use: Yes     Alcohol/week: 0.8 standard drinks     Types: 1 Glasses of wine per week     Comment: 4 nights a week    Drug use: No    Sexual activity: Not on file     Comment: Hysterectomy   Lifestyle    Physical activity     Days per week: Not on file     Minutes per session: Not on file    Stress: Not on file   Relationships    Social connections     Talks on phone: Not on file     Gets together: Not on file     Attends Zoroastrian service: Not on file     Active member of club or organization: Not on file     Attends meetings of clubs or organizations: Not on file     Relationship status: Not on file    Intimate partner violence     Fear of current or ex partner: Not on file     Emotionally abused: Not on file     Physically abused: Not on file     Forced sexual activity: Not on file   Other Topics Concern    Not on file   Social History Narrative    Not on file       Current Outpatient Medications   Medication Sig Dispense Refill    lidocaine (LIDODERM) 5 % Apply patch to the affected area for 12 hours a day and remove for 12 hours a day.  60 Patch 2    predniSONE (DELTASONE) 10 mg tablet 6 pills Day 1, 5 pills Day 2, 4 pills Day 3&4, 3 pills Day 5&6, 2 pills Day 7&8, 1 pill Day 9&10, 1/2 pill Day 11&12 32 Tab 0    thyroid, Pork, (ARMOUR THYROID) 60 mg tablet Take 60 mg by mouth daily. Plus 50 mg qdaily      diclofenac (FLECTOR) 1.3 % pt12 1 Patch by TransDERmal route every twelve (12) hours every twelve (12) hours. Apply to left hip. 60 Patch 5    apixaban (ELIQUIS) 5 mg tablet Take 5 mg by mouth two (2) times a day.  flecainide acetate (FLECAINIDE PO) Take  by mouth two (2) times a day.  predniSONE (DELTASONE) 1 mg tablet Take 3 mg by mouth daily.  acetaminophen (TYLENOL EXTRA STRENGTH) 500 mg tablet Take  by mouth every six (6) hours as needed for Pain.  metoprolol succinate (TOPROL-XL) 25 mg XL tablet Take 25 mg by mouth daily. Take 1 tab po q am, take 1/2 tab po q pm  0    rosuvastatin (CRESTOR) 20 mg tablet Take 20 mg by mouth nightly.  cycloSPORINE (RESTASIS) 0.05 % ophthalmic emulsion Administer 1 Drop to both eyes two (2) times a day.  benazepril (LOTENSIN) 40 mg tablet Take 40 mg by mouth daily.  ALPRAZolam (XANAX) 0.5 mg tablet Take 0.5 mg by mouth.  pregabalin (LYRICA) 75 mg capsule Take 1 cap by mouth at night for 1 week, then increase to 2 as directed. (Patient not taking: Reported on 6/1/2020) 60 Cap 1    buPROPion XL (WELLBUTRIN XL) 150 mg tablet Take 150 mg by mouth daily.  DULoxetine (CYMBALTA) 60 mg capsule Take 60 mg by mouth daily. Allergies   Allergen Reactions    Adhesive Tape-Silicones Other (comments)    Levaquin [Levofloxacin] Nausea and Vomiting         REVIEW OF SYSTEMS    Constitutional: Negative for fever or chills. Positive for 9 lb weight gain. Respiratory: Negative for cough or shortness of breath. Cardiovascular: Negative for chest pain or palpitations. Gastrointestinal: Negative for acid reflux, change in bowel habits, or constipation. Genitourinary: Negative for dysuria and flank pain. Musculoskeletal: Positive for lumbar pain.   Neurological: Negative for headaches, dizziness, or numbness. Endo/Heme/Allergies: Negative for increased bruising. Psychiatric/Behavioral: Positive for difficulty with sleep. PHYSICAL EXAMINATION  Visit Vitals  /56   Pulse 66   Temp 98.6 °F (37 °C) (Oral)   Resp 20   Ht 5' 3\" (1.6 m)   Wt 199 lb 12.8 oz (90.6 kg)   SpO2 98%   BMI 35.39 kg/m²       Constitutional: Awake, alert, and in no acute distress. Neurological: 1+ symmetrical DTRs in the upper extremities. 1+ symmetrical DTRs in the lower extremities. Sensation to light touch is intact. Negative Hart's sign bilaterally. Skin: warm, dry, and intact. Musculoskeletal: Tenderness to palpation in the lower lumbar region and over the sacroiliac joints. Moderate pain with extension and axial loading. Improvement with forward flexion. No pain with internal or external rotation of her hips. Negative straight leg raise bilaterally. Patient ambulates with the assistance of a single point cane. Hip Flex  Quads Hamstrings Ankle DF EHL Ankle PF   Right +4/5 +4/5 +4/5 +4/5 +4/5 +4/5   Left +4/5 +4/5 +4/5 +4/5 +4/5 +4/5     IMAGING:    Lumbar spine MRI from 02/24/2020 was personally reviewed with the patient and demonstrated:          Results from [facility] on 02/24/20   MRI LUMB SPINE WO CONT     Narrative MR lumbar spine without contrast     HISTORY: Chronic lumbar pain, acute bilateral low back pain with right-sided  sciatica, right leg weakness, previous lumbar fusion.     COMPARISON: MRI 9/25/2015     TECHNIQUE: Lumbar spine scanned with axial and sagittal T1W scans, sagittal  STIR, axial and sagittal T2W scans.     FINDINGS: L2-L5 posterolateral fusion. Stable grade 1 anterolisthesis of L4 on  L5. Mild dextroscoliosis. Normal vertebral body heights. Heterogeneous marrow  signal on the basis of endplate degenerative changes. L1 vertebral hemangioma. The conus medullaris is normal in signal and caliber ending at the T12-L1 disc  level.  No clumping of the cauda equina nerve roots. Postoperative changes in the  paraspinal soft tissues. No fluid collection or mass.     Spinal canal and neural foramen: Limited visualization due to hardware related  artifact. Bulging disc and osteophyte complexes at T9-10 and T10-11 without  significant spinal canal stenosis.      T12-L1: Tiny left central disc protrusion, new. No spinal canal or foraminal  stenosis.     L1-2: Significant worsening of degenerative disc and facet disease. Disc is  narrowed with bulging disc and osteophyte complex. Superimposed left central  disc protrusion. Moderate facet and ligamentum flavum hypertrophy with trace  bilateral facet effusions. Mild to moderate spinal canal and right greater than  left foraminal stenoses. No exiting nerve root compression.     L2-3: Similar disc space narrowing with disc osteophyte complex and facet  arthropathy with ligamentum flavum thickening. Mild spinal canal and foraminal  stenoses, stable.     L3-4: Similar disc space narrowing. Effective decompression of the spinal canal  with no residual stenosis. Widely patent left neural foramen but there is  similar degree of moderate right foraminal stenosis.     L4-5: Similar disc space narrowing and disc bulge. Spinal canal is effectively  decompressed with no significant residual stenosis. Mild foraminal stenoses  without nerve root compression, stable.      L5-S1: Disc bulge and facet hypertrophy. No spinal canal stenosis. Stable mild  to moderate left foraminal stenosis.       Impression IMPRESSION:     1.  Interval worsening of degenerative spinal disease most prominent at L1-2  where there is disc osteophyte complex and superimposed left central disc  protrusion producing mild to moderate spinal canal stenosis and right greater  than left foraminal stenoses. 2.  New tiny left central disc protrusion at T12-L1 without spinal stenosis.   3.  Effective decompression of the L3-4 and L4-5 spinal canal with mild residual  spinal stenosis at L2-3. Written by Maryann Poole, as dictated by Jorgito Dunaway MD.  I, Dr. Jorgito Dunaway confirm that all documentation is accurate.

## 2020-06-01 NOTE — LETTER
6/1/20 Patient: Misa Bhatia YOB: 1945 Date of Visit: 6/1/2020 Rich Rosa MD 
[address] [place] [address] VIA Facsimile: [phone] Dear Rich Rosa MD, Thank you for referring Ms. Mary Darden to [facility] for evaluation. My notes for this consultation are attached. If you have questions, please do not hesitate to call me. I look forward to following your patient along with you. Sincerely, Nehemiah Altamirano MD

## 2020-06-01 NOTE — PATIENT INSTRUCTIONS
Low Back Arthritis: Exercises Introduction Here are some examples of typical rehabilitation exercises for your condition. Start each exercise slowly. Ease off the exercise if you start to have pain. Your doctor or physical therapist will tell you when you can start these exercises and which ones will work best for you. When you are not being active, find a comfortable position for rest. Some people are comfortable on the floor or a medium-firm bed with a small pillow under their head and another under their knees. Some people prefer to lie on their side with a pillow between their knees. Don't stay in one position for too long. Take short walks (10 to 20 minutes) every 2 to 3 hours. Avoid slopes, hills, and stairs until you feel better. Walk only distances you can manage without pain, especially leg pain. How to do the exercises Pelvic tilt 1. Lie on your back with your knees bent. 2. \"Brace\" your stomachtighten your muscles by pulling in and imagining your belly button moving toward your spine. 3. Press your lower back into the floor. You should feel your hips and pelvis rock back. 4. Hold for 6 seconds while breathing smoothly. 5. Relax and allow your pelvis and hips to rock forward. 6. Repeat 8 to 12 times. Back stretches 1. Get down on your hands and knees on the floor. 2. Relax your head and allow it to droop. Round your back up toward the ceiling until you feel a nice stretch in your upper, middle, and lower back. Hold this stretch for as long as it feels comfortable, or about 15 to 30 seconds. 3. Return to the starting position with a flat back while you are on your hands and knees. 4. Let your back sway by pressing your stomach toward the floor. Lift your buttocks toward the ceiling. 5. Hold this position for 15 to 30 seconds. 6. Repeat 2 to 4 times. Follow-up care is a key part of your treatment and safety.  Be sure to make and go to all appointments, and call your doctor if you are having problems. It's also a good idea to know your test results and keep a list of the medicines you take. Where can you learn more? Go to http://geovanna-tish.info/ Enter B622 in the search box to learn more about \"Low Back Arthritis: Exercises. \" Current as of: March 2, 2020               Content Version: 12.5 © 2006-2020 [employer]. Care instructions adapted under license by [employer] (which disclaims liability or warranty for this information). If you have questions about a medical condition or this instruction, always ask your healthcare professional. [address] any warranty or liability for your use of this information. Sacroiliac Pain: Exercises Introduction Here are some examples of exercises for you to try. The exercises may be suggested for a condition or for rehabilitation. Start each exercise slowly. Ease off the exercises if you start to have pain. You will be told when to start these exercises and which ones will work best for you. How to do the exercises Knee-to-chest stretch 7. Do not do the knee-to-chest exercise if it causes or increases back or leg pain. 8. Lie on your back with your knees bent and your feet flat on the floor. You can put a small pillow under your head and neck if it is more comfortable. 9. Grasp your hands under one knee and bring the knee to your chest, keeping the other foot flat on the floor. 10. Keep your lower back pressed to the floor. Hold for at least 15 to 30 seconds. 11. Relax and lower the knee to the starting position. Repeat with the other leg. 12. Repeat 2 to 4 times with each leg. 13. To get more stretch, keep your other leg flat on the floor while pulling your knee to your chest.   
Bridging 7. Lie on your back with both knees bent. Your knees should be bent about 90 degrees. 8. Tighten your belly muscles by pulling in your belly button toward your spine. Then push your feet into the floor, squeeze your buttocks, and lift your hips off the floor until your shoulders, hips, and knees are all in a straight line. 9. Hold for about 6 seconds as you continue to breathe normally, and then slowly lower your hips back down to the floor and rest for up to 10 seconds. 10. Repeat 8 to 12 times. Hip extension 1. Get down on your hands and knees on the floor. 2. Keeping your back and neck straight, lift one leg straight out behind you. When you lift your leg, keep your hips level. Don't let your back twist, and don't let your hip drop toward the floor. 3. Hold for 6 seconds. Repeat 8 to 12 times with each leg. 4. If you feel steady and strong when you do this exercise, you can make it more difficult. To do this, when you lift your leg, also lift the opposite arm straight out in front of you. For example, lift the left leg and the right arm at the same time. (This is sometimes called the \"bird dog exercise. \") Hold for 6 seconds, and repeat 8 to 12 times on each side. Clamshell 1. Lie on your side with a pillow under your head. Keep your feet and knees together and your knees bent. 2. Raise your top knee, but keep your feet together. Do not let your hips roll back. Your legs should open up like a clamshell. 3. Hold for 6 seconds. 4. Slowly lower your knee back down. Rest for 10 seconds. 5. Repeat 8 to 12 times. 6. Switch to your other side and repeat steps 1 through 5. Hamstring wall stretch 1. Lie on your back in a doorway, with one leg through the open door. 2. Slide your affected leg up the wall to straighten your knee. You should feel a gentle stretch down the back of your leg. 3. Hold the stretch for at least 1 minute to begin. Then try to lengthen the time you hold the stretch to as long as 6 minutes. 4. Switch legs, and repeat steps 1 through 3. 5. Repeat 2 to 4 times. 6. If you do not have a place to do this exercise in a doorway, there is another way to do it: 
7. Lie on your back, and bend one knee. 8. Loop a towel under the ball and toes of that foot, and hold the ends of the towel in your hands. 9. Straighten your knee, and slowly pull back on the towel. You should feel a gentle stretch down the back of your leg. 10. Switch legs, and repeat steps 1 through 3. 
11. Repeat 2 to 4 times. 1. Do not arch your back. 2. Do not bend either knee. 3. Keep one heel touching the floor and the other heel touching the wall. Do not point your toes. Lower abdominal strengthening 1. Lie on your back with your knees bent and your feet flat on the floor. 2. Tighten your belly muscles by pulling your belly button in toward your spine. 3. Lift one foot off the floor and bring your knee toward your chest, so that your knee is straight above your hip and your leg is bent like the letter \"L. \" 
4. Lift the other knee up to the same position. 5. Lower one leg at a time to the starting position. 6. Keep alternating legs until you have lifted each leg 8 to 12 times. 7. Be sure to keep your belly muscles tight and your back still as you are moving your legs. Be sure to breathe normally. Piriformis stretch 1. Lie on your back with your legs straight. 2. Lift your affected leg, and bend your knee. With your opposite hand, reach across your body, and then gently pull your knee toward your opposite shoulder. 3. Hold the stretch for 15 to 30 seconds. 4. Switch legs and repeat steps 1 through 3. 
5. Repeat 2 to 4 times. Follow-up care is a key part of your treatment and safety. Be sure to make and go to all appointments, and call your doctor if you are having problems. It's also a good idea to know your test results and keep a list of the medicines you take. Where can you learn more? Go to http://geovanna-tish.info/ Enter D705 in the search box to learn more about \"Sacroiliac Pain: Exercises. \" Current as of: March 2, 2020               Content Version: 12.5 © 2006-2020 [employer]. Care instructions adapted under license by [employer] (which disclaims liability or warranty for this information). If you have questions about a medical condition or this instruction, always ask your healthcare professional. [place] [address] any warranty or liability for your use of this information.

## 2020-06-17 ENCOUNTER — APPOINTMENT (OUTPATIENT)
Dept: GENERAL RADIOLOGY | Age: 75
End: 2020-06-17
Attending: PHYSICAL MEDICINE & REHABILITATION
Payer: MEDICARE

## 2020-06-17 ENCOUNTER — HOSPITAL ENCOUNTER (OUTPATIENT)
Age: 75
Setting detail: OUTPATIENT SURGERY
Discharge: HOME | End: 2020-06-17
Attending: PHYSICAL MEDICINE & REHABILITATION | Admitting: PHYSICAL MEDICINE & REHABILITATION
Payer: MEDICARE

## 2020-06-17 VITALS
OXYGEN SATURATION: 95 % | DIASTOLIC BLOOD PRESSURE: 88 MMHG | HEART RATE: 75 BPM | RESPIRATION RATE: 17 BRPM | TEMPERATURE: 97 F | SYSTOLIC BLOOD PRESSURE: 177 MMHG

## 2020-06-17 PROCEDURE — 74011000250 HC RX REV CODE- 250: Performed by: PHYSICAL MEDICINE & REHABILITATION

## 2020-06-17 PROCEDURE — 74011250636 HC RX REV CODE- 250/636: Performed by: PHYSICAL MEDICINE & REHABILITATION

## 2020-06-17 PROCEDURE — 77030039433 HC TY MYLEOGRAM BD -B: Performed by: PHYSICAL MEDICINE & REHABILITATION

## 2020-06-17 PROCEDURE — 76010000009 HC PAIN MGT 0 TO 30 MIN PROC: Performed by: PHYSICAL MEDICINE & REHABILITATION

## 2020-06-17 PROCEDURE — 77030003676 HC NDL SPN MPRI -A: Performed by: PHYSICAL MEDICINE & REHABILITATION

## 2020-06-17 RX ORDER — DIAZEPAM 5 MG/1
5-20 TABLET ORAL ONCE
Status: DISCONTINUED | OUTPATIENT
Start: 2020-06-17 | End: 2020-06-17 | Stop reason: HOSPADM

## 2020-06-17 RX ORDER — DEXAMETHASONE SODIUM PHOSPHATE 100 MG/10ML
INJECTION INTRAMUSCULAR; INTRAVENOUS AS NEEDED
Status: DISCONTINUED | OUTPATIENT
Start: 2020-06-17 | End: 2020-06-17 | Stop reason: HOSPADM

## 2020-06-17 RX ORDER — LIDOCAINE HYDROCHLORIDE 10 MG/ML
INJECTION, SOLUTION EPIDURAL; INFILTRATION; INTRACAUDAL; PERINEURAL AS NEEDED
Status: DISCONTINUED | OUTPATIENT
Start: 2020-06-17 | End: 2020-06-17 | Stop reason: HOSPADM

## 2020-06-17 NOTE — H&P
Date of Surgery Update:  Preeti Mc was seen and examined. History and physical has been reviewed. The patient has been examined. There have been no significant clinical changes since the last office visit. The patient was counseled at length about the risks of ruby Covid-19 during their perioperative period and any recovery window from their procedure. The patient was made aware that ruby Covid-19  may worsen their prognosis for recovering from their procedure and lend to a higher morbidity and/or mortality risk. All material risks, benefits, and reasonable alternatives including postponing the procedure were discussed. The patient does  wish to proceed with the procedure at this time.         Signed By: Tracey Petty MD     June 17, 2020 12:52 PM

## 2020-06-17 NOTE — PERIOP NOTES
Patient tolerated procedure well. No complications noted. VSS. No redness, swelling, or bleeding from injection site. Dressing dry and intact. Armband removed and shredded. Patient waited in waiting room for 10 minutes. RN ambulated to main entrance with patient. Patient discharged alive and well, in stable condition.

## 2020-06-17 NOTE — PROCEDURES
Facet Joint Block Procedure Note    Patient Name: Bishop Danielle    Date of Procedure: June 17, 2020    Preoperative Diagnosis: Spinal stenosis, lumbar region with neurogenic claudication [M48.062], lumbar facet, muscle spasm    Post Operative Diagnosis:Spinal stenosis, lumbar region with neurogenic claudication [M48.062] , same    Procedure:  bilateral  L5-S1 Facet Joint Block    Consent: Informed consent was obtained prior to the procedure. The patient was given the opportunity to ask questions regarding the procedure and its associated risks. In addition to the potential risks associated with the procedure itself, the patient was informed both verbally and in writing of potential side effects of the use of glucocorticoids. The patient appeared to comprehend the informed consent and desired to have the procedure performed. Procedure: The patient was placed in the prone position on the flouroscopy table and the back was prepped and draped in the usual sterile manner. At each blocked level, the exact location of the facet joint was identified with flouroscopy, and after local Lidocaine 1% injection, a #22 gauge spinal needle was then advanced toward the joint. A total of 10 mg of preservative free Dexamethasone and 5 cc of Lidocaine was injected around and equally divided among all of the sites. The patient tolerated the procedure well. The injection area was cleaned and bandaids applied. No excessive bleeding was noted. Patient dressed and was discharged to home with instructions.        King Aliyah MD  June 17, 2020

## 2020-06-17 NOTE — DISCHARGE INSTRUCTIONS
[facility]   ([facility])  Dr. Tarun Maharaj, Dr. [facility], Dr. Ruslan Lozada not drive a car, operate heavy machinery or dangerous equipment for 24 hours. * Activity as tolerated; rest for the remainder of the day. * Resume pre-block medications including those for your family doctor. * Do not drink alcoholic beverages for 24 hours. Alcohol and the medications you have received may interact and cause an adverse reaction. * You may feel better this evening and worse tomorrow, as the numbing medications wears off and the steroid has yet to begin to work. After 48 hrs the steroid should begin to release bringing you relief. * You may shower this evening and remove any bandages. * Avoid hot tubs and heating pads for 24 hours. You may use cold packs on the procedure site as tolerated for the first 24 hours. * If a headache develops, drink plenty of fluids and rest.  Take over the counter medications for headache if needed. If the headache continues longer than 24 hours, call MD at the [address] [place] [address]. [phone]    * Continue taking pain medications as needed. * You may resume your regular diet if tolerated. Otherwise, start with sips of water and advance slowly. * If Diabetic: check your blood sugar three times a day for the next 3 days. If your sugar is greater than 300 call your family doctor. If your sugar is greater than 400, have someone transport you to the nearest Emergency Room. * If you experience any of the following problems, Please Call the [address] [place] [address] at [phone].         * Shortness of Breath    * Fever of 101 or higher    * Nausea / Vomiting    * Severe Headache    * Weakness or numbness in arms or legs that is not      resolving    * Prolonged increase in pain greater than 4 days      DISCHARGE SUMMARY from Nurse      PATIENT INSTRUCTIONS:    After oral sedation, for 24 hours or while taking prescription Narcotics:  · Limit your activities  · Do not drive and operate hazardous machinery  · Do not make important personal or business decisions  · Do  not drink alcoholic beverages  · If you have not urinated within 8 hours after discharge, please contact your surgeon on call. Report the following to your surgeon:  · Excessive pain, swelling, redness or odor of or around the surgical area  · Temperature over 101  · Nausea and vomiting lasting longer than 4 hours or if unable to take medications  · Any signs of decreased circulation or nerve impairment to extremity: change in color, persistent  numbness, tingling, coldness or increase pain  · Any questions            What to do at Home:  Recommended activity: Activity as tolerated, NO DRIVING FOR 12 Hours post injection          *  Please give a list of your current medications to your Primary Care Provider. *  Please update this list whenever your medications are discontinued, doses are      changed, or new medications (including over-the-counter products) are added. *  Please carry medication information at all times in case of emergency situations. These are general instructions for a healthy lifestyle:    No smoking/ No tobacco products/ Avoid exposure to second hand smoke    Surgeon General's Warning:  Quitting smoking now greatly reduces serious risk to your health. Obesity, smoking, and sedentary lifestyle greatly increases your risk for illness    A healthy diet, regular physical exercise & weight monitoring are important for maintaining a healthy lifestyle    You may be retaining fluid if you have a history of heart failure or if you experience any of the following symptoms:  Weight gain of 3 pounds or more overnight or 5 pounds in a week, increased swelling in our hands or feet or shortness of breath while lying flat in bed.   Please call your doctor as soon as you notice any of these symptoms; do not wait until your next office visit. Recognize signs and symptoms of STROKE:    F-face looks uneven    A-arms unable to move or move unevenly    S-speech slurred or non-existent    T-time-call 911 as soon as signs and symptoms begin-DO NOT go       Back to bed or wait to see if you get better-TIME IS BRAIN.

## 2020-07-27 ENCOUNTER — OFFICE VISIT (OUTPATIENT)
Dept: ORTHOPEDIC SURGERY | Age: 75
End: 2020-07-27

## 2020-07-27 VITALS
OXYGEN SATURATION: 95 % | HEART RATE: 76 BPM | WEIGHT: 196.8 LBS | TEMPERATURE: 97.8 F | HEIGHT: 63 IN | RESPIRATION RATE: 18 BRPM | DIASTOLIC BLOOD PRESSURE: 53 MMHG | SYSTOLIC BLOOD PRESSURE: 139 MMHG | BODY MASS INDEX: 34.87 KG/M2

## 2020-07-27 DIAGNOSIS — M62.838 MUSCLE SPASM: ICD-10-CM

## 2020-07-27 DIAGNOSIS — M48.062 SPINAL STENOSIS OF LUMBAR REGION WITH NEUROGENIC CLAUDICATION: ICD-10-CM

## 2020-07-27 DIAGNOSIS — M47.812 CERVICAL FACET JOINT SYNDROME: ICD-10-CM

## 2020-07-27 DIAGNOSIS — G89.29 CHRONIC SACROILIAC PAIN: ICD-10-CM

## 2020-07-27 DIAGNOSIS — M79.18 MYOFASCIAL PAIN: ICD-10-CM

## 2020-07-27 DIAGNOSIS — M54.2 CERVICAL PAIN: Primary | ICD-10-CM

## 2020-07-27 DIAGNOSIS — M53.3 CHRONIC SACROILIAC PAIN: ICD-10-CM

## 2020-07-27 DIAGNOSIS — Z79.01 CHRONIC ANTICOAGULATION: ICD-10-CM

## 2020-07-27 DIAGNOSIS — M35.3 PMR (POLYMYALGIA RHEUMATICA) (HCC): ICD-10-CM

## 2020-07-27 DIAGNOSIS — M50.30 DDD (DEGENERATIVE DISC DISEASE), CERVICAL: ICD-10-CM

## 2020-07-27 NOTE — PATIENT INSTRUCTIONS
Neck Arthritis: Exercises Introduction Here are some examples of exercises for you to try. The exercises may be suggested for a condition or for rehabilitation. Start each exercise slowly. Ease off the exercises if you start to have pain. You will be told when to start these exercises and which ones will work best for you. How to do the exercises Neck stretches to the side 1. This stretch works best if you keep your shoulder down as you lean away from it. To help you remember to do this, start by relaxing your shoulders and lightly holding on to your thighs or your chair. 2. Tilt your head toward your shoulder and hold for 15 to 30 seconds. Let the weight of your head stretch your muscles. 3. Repeat 2 to 4 times toward each shoulder. Chin tuck 1. Lie on the floor with a rolled-up towel under your neck. Your head should be touching the floor. 2. Slowly bring your chin toward your chest. 
3. Hold for a count of 6, and then relax for up to 10 seconds. 4. Repeat 8 to 12 times. Active cervical rotation 1. Sit in a firm chair, or stand up straight. 2. Keeping your chin level, turn your head to the right, and hold for 15 to 30 seconds. 3. Turn your head to the left and hold for 15 to 30 seconds. 4. Repeat 2 to 4 times to each side. Shoulder blade squeeze 1. While standing, squeeze your shoulder blades together. 2. Do not raise your shoulders up as you are squeezing. 3. Hold for 6 seconds. 4. Repeat 8 to 12 times. Shoulder rolls 1. Sit comfortably with your feet shoulder-width apart. You can also do this exercise standing up. 2. Roll your shoulders up, then back, and then down in a smooth, circular motion. 3. Repeat 2 to 4 times. Follow-up care is a key part of your treatment and safety. Be sure to make and go to all appointments, and call your doctor if you are having problems. It's also a good idea to know your test results and keep a list of the medicines you take. Where can you learn more? Go to http://geovanna-tish.info/ Enter U339 in the search box to learn more about \"Neck Arthritis: Exercises. \" Current as of: March 2, 2020               Content Version: 12.5 © 2006-2020 Healthwise, Incorporated. Care instructions adapted under license by [employer] (which disclaims liability or warranty for this information). If you have questions about a medical condition or this instruction, always ask your healthcare professional. [address] any warranty or liability for your use of this information. Sacroiliac Pain: Exercises Introduction Here are some examples of exercises for you to try. The exercises may be suggested for a condition or for rehabilitation. Start each exercise slowly. Ease off the exercises if you start to have pain. You will be told when to start these exercises and which ones will work best for you. How to do the exercises Knee-to-chest stretch 4. Do not do the knee-to-chest exercise if it causes or increases back or leg pain. 5. Lie on your back with your knees bent and your feet flat on the floor. You can put a small pillow under your head and neck if it is more comfortable. 6. Grasp your hands under one knee and bring the knee to your chest, keeping the other foot flat on the floor. 7. Keep your lower back pressed to the floor. Hold for at least 15 to 30 seconds. 8. Relax and lower the knee to the starting position. Repeat with the other leg. 9. Repeat 2 to 4 times with each leg. 10. To get more stretch, keep your other leg flat on the floor while pulling your knee to your chest.   
Bridging 5. Lie on your back with both knees bent. Your knees should be bent about 90 degrees. 6. Tighten your belly muscles by pulling in your belly button toward your spine.  Then push your feet into the floor, squeeze your buttocks, and lift your hips off the floor until your shoulders, hips, and knees are all in a straight line. 7. Hold for about 6 seconds as you continue to breathe normally, and then slowly lower your hips back down to the floor and rest for up to 10 seconds. 8. Repeat 8 to 12 times. Hip extension 5. Get down on your hands and knees on the floor. 6. Keeping your back and neck straight, lift one leg straight out behind you. When you lift your leg, keep your hips level. Don't let your back twist, and don't let your hip drop toward the floor. 7. Hold for 6 seconds. Repeat 8 to 12 times with each leg. 8. If you feel steady and strong when you do this exercise, you can make it more difficult. To do this, when you lift your leg, also lift the opposite arm straight out in front of you. For example, lift the left leg and the right arm at the same time. (This is sometimes called the \"bird dog exercise. \") Hold for 6 seconds, and repeat 8 to 12 times on each side. Clamshell 5. Lie on your side with a pillow under your head. Keep your feet and knees together and your knees bent. 6. Raise your top knee, but keep your feet together. Do not let your hips roll back. Your legs should open up like a clamshell. 7. Hold for 6 seconds. 8. Slowly lower your knee back down. Rest for 10 seconds. 9. Repeat 8 to 12 times. 10. Switch to your other side and repeat steps 1 through 5. Hamstring wall stretch 4. Lie on your back in a doorway, with one leg through the open door. 5. Slide your affected leg up the wall to straighten your knee. You should feel a gentle stretch down the back of your leg. 6. Hold the stretch for at least 1 minute to begin. Then try to lengthen the time you hold the stretch to as long as 6 minutes. 7. Switch legs, and repeat steps 1 through 3. 
8. Repeat 2 to 4 times. 9. If you do not have a place to do this exercise in a doorway, there is another way to do it: 
10. Lie on your back, and bend one knee. 11. Loop a towel under the ball and toes of that foot, and hold the ends of the towel in your hands. 12. Straighten your knee, and slowly pull back on the towel. You should feel a gentle stretch down the back of your leg. 13. Switch legs, and repeat steps 1 through 3. 
14. Repeat 2 to 4 times. 1. Do not arch your back. 2. Do not bend either knee. 3. Keep one heel touching the floor and the other heel touching the wall. Do not point your toes. Lower abdominal strengthening 1. Lie on your back with your knees bent and your feet flat on the floor. 2. Tighten your belly muscles by pulling your belly button in toward your spine. 3. Lift one foot off the floor and bring your knee toward your chest, so that your knee is straight above your hip and your leg is bent like the letter \"L. \" 
4. Lift the other knee up to the same position. 5. Lower one leg at a time to the starting position. 6. Keep alternating legs until you have lifted each leg 8 to 12 times. 7. Be sure to keep your belly muscles tight and your back still as you are moving your legs. Be sure to breathe normally. Piriformis stretch 1. Lie on your back with your legs straight. 2. Lift your affected leg, and bend your knee. With your opposite hand, reach across your body, and then gently pull your knee toward your opposite shoulder. 3. Hold the stretch for 15 to 30 seconds. 4. Switch legs and repeat steps 1 through 3. 
5. Repeat 2 to 4 times. Follow-up care is a key part of your treatment and safety. Be sure to make and go to all appointments, and call your doctor if you are having problems. It's also a good idea to know your test results and keep a list of the medicines you take. Where can you learn more? Go to http://geovanna-tish.info/ Enter I303 in the search box to learn more about \"Sacroiliac Pain: Exercises. \" Current as of: March 2, 2020               Content Version: 12.5 © 1159-4633 [employer]. Care instructions adapted under license by [employer] (which disclaims liability or warranty for this information). If you have questions about a medical condition or this instruction, always ask your healthcare professional. [address] any warranty or liability for your use of this information.

## 2020-07-27 NOTE — PROGRESS NOTES
[facility]  [address] [place], [address]  Phone: [phone]  Fax: [phone]    Pt's YOB: 1945    ASSESSMENT   Diagnoses and all orders for this visit:    1. Cervical pain  -     AMB POC XRAY, SPINE, CERVICAL; 2 OR 3    2. Chronic sacroiliac pain    3. Muscle spasm    4. Spinal stenosis of lumbar region with neurogenic claudication    5. Cervical facet joint syndrome    6. DDD (degenerative disc disease), cervical    7. PMR (polymyalgia rheumatica) (HCC)    8. Myofascial pain    9. Chronic anticoagulation         IMPRESSION AND PLAN:  Tom Day is a 69 y.o. female with history of chronic lumbar pain. She followed up with Dr. Ana María Sidhu on Monday, 7/20/2020, and was referred to Dr. Seven Saleh for surgical evaluation. She reports minimal relief with a bilateral L5-S1 facet injection on 6/17/2020 but notes  significant temporary relief with previous sacroiliac joint injections. Pt uses Lidoderm patches with benefit. 1) Pt was given information on cervical arthritis and sacroiliac exercises. 2) She is not a candidate for NSAID's due to chronic anticoagulation with Eliquis. 3) Pt will continue using Lidoderm patches and does not need a refill at this time. 5) Pt was given information on how to use a TheraCane. 6) I recommended she decrease her Cymbalta 60 mg to 30 mg daily to assess for changes in the side effects (ex. Sweating)   7) Ms. Benjamín Flores has a reminder for a \"due or due soon\" health maintenance. I have asked that she contact her primary care provider, Lamberto Sewell MD, for follow-up on this health maintenance. 8)  demonstrated consistency with prescribing. Follow-up and Dispositions    · Return in about 2 months (around 9/27/2020) for Medication follow up.              HISTORY OF PRESENT ILLNESS:  Tom Day is a 69 y.o. female with history of chronic lumbar pain and presents to the office today for follow up. Pt reports pressure and pain across the lower back but denies any pain radiating down the legs at this time. She also complains of difficulty changing positions in bed secondary to pain. Pt reports pain and a frequent popping sensation in the neck with side to side cervical flexion. She had a right shoulder replacement at [facility] and notes that she received left shoulder injections regularly. She followed up with Dr. Alexandria Carty on Monday, 7/20/2020, and was referred to Dr. Suzanna Shane for surgical evaluation. Pt notes that she is scheduled for a lumbar CT myelogram. She reports minimal relief with a bilateral L5-S1 facet injection on 6/17/2020. Pt notes significant temporary relief with previous sacroiliac joint injections. She reports that she likes to use the seated elliptical but all her exercise classes have been cancelled and the fitness center has been closed due to COVID-19. Pt reports relief when using Lidoderm patches. She has a history of polymyalgia rheumatica and notes that she has decreased to prednisone 3 mg daily. Pt notes that her PCP, Natty Mccray MD recently recommended she restart the Cymbalta 60 mg 1 tab daily for relief but she notes sedation and diaphoresis with the medication. Pt notes that she restarted the Cymbalta 60 mg less than 1 week ago. She notes that she has a TheraCane at home but admits that she has not recently used it. Pt at this time desires to continue with current care.     Pain Scale: 6/10    PCP: Natty Mccray MD     Past Medical History:   Diagnosis Date    Arthritis     Hypercholesterolemia     Hypertension     Ill-defined condition     osteopenia    Migraine     PMR [facility]ym[facility]algia [facility]a) ([facility])     Thyroid disease         Social History     Socioeconomic History    Marital status:      Spouse name: Not on file    Number of children: Not on file    Years of education: Not on file    Highest education level: Not on file   Occupational History    Not on file   Social Needs    Financial resource strain: Not on file    Food insecurity     Worry: Not on file     Inability: Not on file    Transportation needs     Medical: Not on file     Non-medical: Not on file   Tobacco Use    Smoking status: Former Smoker     Packs/day: 1.00     Years: 5.00     Pack years: 5.00     Last attempt to quit: 1970     Years since quittin.5    Smokeless tobacco: Never Used   Substance and Sexual Activity    Alcohol use: Yes     Alcohol/week: 0.8 standard drinks     Types: 1 Glasses of wine per week     Comment: 4 nights a week    Drug use: No    Sexual activity: Not on file     Comment: Hysterectomy   Lifestyle    Physical activity     Days per week: Not on file     Minutes per session: Not on file    Stress: Not on file   Relationships    Social connections     Talks on phone: Not on file     Gets together: Not on file     Attends Oriental orthodox service: Not on file     Active member of club or organization: Not on file     Attends meetings of clubs or organizations: Not on file     Relationship status: Not on file    Intimate partner violence     Fear of current or ex partner: Not on file     Emotionally abused: Not on file     Physically abused: Not on file     Forced sexual activity: Not on file   Other Topics Concern    Not on file   Social History Narrative    Not on file       Current Outpatient Medications   Medication Sig Dispense Refill    lidocaine (LIDODERM) 5 % Apply patch to the affected area for 12 hours a day and remove for 12 hours a day. 60 Patch 2    thyroid, Pork, (ARMOUR THYROID) 60 mg tablet Take 60 mg by mouth daily. Plus 50 mg qdaily      diclofenac (FLECTOR) 1.3 % pt12 1 Patch by TransDERmal route every twelve (12) hours every twelve (12) hours. Apply to left hip. 60 Patch 5    apixaban (ELIQUIS) 5 mg tablet Take 5 mg by mouth two (2) times a day.       flecainide acetate (FLECAINIDE PO) Take  by mouth two (2) times a day.  predniSONE (DELTASONE) 1 mg tablet Take 3 mg by mouth daily.  DULoxetine (CYMBALTA) 60 mg capsule Take 60 mg by mouth daily.  acetaminophen (TYLENOL EXTRA STRENGTH) 500 mg tablet Take  by mouth every six (6) hours as needed for Pain.  metoprolol succinate (TOPROL-XL) 25 mg XL tablet Take 25 mg by mouth daily. Take 1 tab po q am, take 1/2 tab po q pm  0    rosuvastatin (CRESTOR) 20 mg tablet Take 20 mg by mouth nightly.  cycloSPORINE (RESTASIS) 0.05 % ophthalmic emulsion Administer 1 Drop to both eyes two (2) times a day.  benazepril (LOTENSIN) 40 mg tablet Take 40 mg by mouth daily.  ALPRAZolam (XANAX) 0.5 mg tablet Take 0.5 mg by mouth.  pregabalin (LYRICA) 75 mg capsule Take 1 cap by mouth at night for 1 week, then increase to 2 as directed. (Patient not taking: Reported on 6/1/2020) 60 Cap 1    buPROPion XL (WELLBUTRIN XL) 150 mg tablet Take 150 mg by mouth daily. Allergies   Allergen Reactions    Adhesive Tape-Silicones Other (comments)    Levaquin [Levofloxacin] Nausea and Vomiting         REVIEW OF SYSTEMS    Constitutional: Negative for fever, chills, or weight change. Respiratory: Negative for cough or shortness of breath. Cardiovascular: Negative for chest pain or palpitations. Gastrointestinal: Negative for acid reflux, change in bowel habits, or constipation. Genitourinary: Negative for dysuria and flank pain. Musculoskeletal: Positive for lumbar and cervical pain. Neurological: Negative for headaches, dizziness, or numbness. Endo/Heme/Allergies: Negative for increased bruising. Psychiatric/Behavioral: Positive for difficulty with sleep. As per HPI    PHYSICAL EXAMINATION  Visit Vitals  /53   Pulse 76   Temp 97.8 °F (36.6 °C) (Oral)   Resp 18   Ht 5' 3\" (1.6 m)   Wt 196 lb 12.8 oz (89.3 kg)   SpO2 95%   BMI 34.86 kg/m²       Constitutional: Awake, alert, and in no acute distress.   Neurological: 1+ symmetrical DTRs in the upper extremities. 1+ symmetrical DTRs in the lower extremities. Sensation to light touch is intact. Negative Hart's sign bilaterally. Skin: warm, dry, and intact. Musculoskeletal: Decreased range of motion with side to side cervical flexion. Tight across the upper trapezius bilaterally. Tenderness to palpation in the lower lumbar and over the sacroiliac joints, L>R. Pain with extension, axial loading, and forward flexion. No pain with internal or external rotation of her hips. Negative straight leg raise bilaterally. Patient ambulates with the assistance of a single point cane. Biceps  Triceps Deltoids Wrist Ext Wrist Flex Hand Intrin   Right +4/5 +4/5 +4/5 +4/5 +4/5 +4/5   Left +4/5 +4/5 +4/5 +4/5 +4/5 +4/5      Hip Flex  Quads Hamstrings Ankle DF EHL Ankle PF   Right +4/5 +4/5 +4/5 +4/5 +4/5 +4/5   Left +4/5 +4/5 +4/5 +4/5 +4/5 +4/5     IMAGING:    Cervical spine 2V x-rays from 7/27/2020 were personally reviewed with the patient and demonstrated:  Diffuse degenerative changes with listhesis at C2-3. Lumbar spine MRI from 02/24/2020 was personally reviewed with the patient and demonstrated:          Results from [facility] on 02/24/20   MRI LUMB SPINE WO CONT     Narrative MR lumbar spine without contrast     HISTORY: Chronic lumbar pain, acute bilateral low back pain with right-sided  sciatica, right leg weakness, previous lumbar fusion.     COMPARISON: MRI 9/25/2015     TECHNIQUE: Lumbar spine scanned with axial and sagittal T1W scans, sagittal  STIR, axial and sagittal T2W scans.     FINDINGS: L2-L5 posterolateral fusion. Stable grade 1 anterolisthesis of L4 on  L5. Mild dextroscoliosis. Normal vertebral body heights. Heterogeneous marrow  signal on the basis of endplate degenerative changes. L1 vertebral hemangioma. The conus medullaris is normal in signal and caliber ending at the T12-L1 disc  level. No clumping of the cauda equina nerve roots.  Postoperative changes in the  paraspinal soft tissues. No fluid collection or mass.     Spinal canal and neural foramen: Limited visualization due to hardware related  artifact. Bulging disc and osteophyte complexes at T9-10 and T10-11 without  significant spinal canal stenosis.      T12-L1: Tiny left central disc protrusion, new. No spinal canal or foraminal  stenosis.     L1-2: Significant worsening of degenerative disc and facet disease. Disc is  narrowed with bulging disc and osteophyte complex. Superimposed left central  disc protrusion. Moderate facet and ligamentum flavum hypertrophy with trace  bilateral facet effusions. Mild to moderate spinal canal and right greater than  left foraminal stenoses. No exiting nerve root compression.     L2-3: Similar disc space narrowing with disc osteophyte complex and facet  arthropathy with ligamentum flavum thickening. Mild spinal canal and foraminal  stenoses, stable.     L3-4: Similar disc space narrowing. Effective decompression of the spinal canal  with no residual stenosis. Widely patent left neural foramen but there is  similar degree of moderate right foraminal stenosis.     L4-5: Similar disc space narrowing and disc bulge. Spinal canal is effectively  decompressed with no significant residual stenosis. Mild foraminal stenoses  without nerve root compression, stable.      L5-S1: Disc bulge and facet hypertrophy. No spinal canal stenosis. Stable mild  to moderate left foraminal stenosis.       Impression IMPRESSION:     1.  Interval worsening of degenerative spinal disease most prominent at L1-2  where there is disc osteophyte complex and superimposed left central disc  protrusion producing mild to moderate spinal canal stenosis and right greater  than left foraminal stenoses. 2.  New tiny left central disc protrusion at T12-L1 without spinal stenosis. 3.  Effective decompression of the L3-4 and L4-5 spinal canal with mild residual  spinal stenosis at L2-3.        Written by Amy Machuca, as dictated by Nannette Webber MD.  I, Dr. Nannette Webber confirm that all documentation is accurate.

## 2020-07-27 NOTE — LETTER
7/27/20 Patient: Jo-Ann Calvert YOB: 1945 Date of Visit: 7/27/2020 Yany Harris MD 
[address] [place] [address] VIA Facsimile: [phone] Dear Yany Harris MD, Thank you for referring Ms. Gisel Ventura to [facility] for evaluation. My notes for this consultation are attached. If you have questions, please do not hesitate to call me. I look forward to following your patient along with you. Sincerely, Gloria Sutton MD

## 2020-07-30 ENCOUNTER — TELEPHONE (OUTPATIENT)
Dept: ORTHOPEDIC SURGERY | Age: 75
End: 2020-07-30

## 2020-07-30 NOTE — TELEPHONE ENCOUNTER
Returned call to patient, verified Name/, informed patient we are unable to submit xray images/report electronically as they xrays are completed through an outside imaging platform. Informed patient Dr. Alyssia Rueda reads the xray, supplies official report, however that can take 2-3 weeks to be completed, available for patient to . Informed patient we can upload images to a CD, print report for her. Informed patient to give us a call back in about 2-3 weeks to see if the report is available. Patient verbalized agreement/understanding. No further action required at this time.

## 2020-07-30 NOTE — TELEPHONE ENCOUNTER
Patient called for Carol Jennings and said that [facility] is requesting a copy of the patient Xray of the Neck that was taken by Carol Jennings on 7/27/2020 at [facility]. Patient said that Etta needs the Xray sent electronically . [facility]   Tel. [phone]. Patient tel. [phone]. Note : patient was asked if she would like to  the copy,. But she said they requested it to be electronically.

## 2020-08-14 NOTE — TELEPHONE ENCOUNTER
Please make disc of images, patient is needing cervical xrays that were done in office on 07/27/2020. I will print report.

## 2020-08-14 NOTE — TELEPHONE ENCOUNTER
P[t calling for status update of her request. Her appt with cardiology is September 16 2020, but they'd like to get her in sooner and need the xrays.     Please contact the patient at C#[phone]

## 2020-08-18 NOTE — TELEPHONE ENCOUNTER
Returned call to patient, verified Name/, patient aware Disc of xrays as well as report are ready for her to  at the  of our [facility] location. No further action required at this time.

## 2020-09-24 ENCOUNTER — OFFICE VISIT (OUTPATIENT)
Dept: ORTHOPEDIC SURGERY | Age: 75
End: 2020-09-24
Payer: MEDICARE

## 2020-09-24 VITALS
HEART RATE: 58 BPM | OXYGEN SATURATION: 99 % | WEIGHT: 195 LBS | RESPIRATION RATE: 17 BRPM | SYSTOLIC BLOOD PRESSURE: 147 MMHG | DIASTOLIC BLOOD PRESSURE: 72 MMHG | TEMPERATURE: 97 F | BODY MASS INDEX: 35.88 KG/M2 | HEIGHT: 62 IN

## 2020-09-24 DIAGNOSIS — M48.062 SPINAL STENOSIS OF LUMBAR REGION WITH NEUROGENIC CLAUDICATION: ICD-10-CM

## 2020-09-24 DIAGNOSIS — M62.838 MUSCLE SPASM: ICD-10-CM

## 2020-09-24 DIAGNOSIS — M79.18 MYOFASCIAL PAIN: ICD-10-CM

## 2020-09-24 DIAGNOSIS — M50.30 DDD (DEGENERATIVE DISC DISEASE), CERVICAL: ICD-10-CM

## 2020-09-24 DIAGNOSIS — Z79.01 CHRONIC ANTICOAGULATION: ICD-10-CM

## 2020-09-24 DIAGNOSIS — M47.812 CERVICAL FACET JOINT SYNDROME: ICD-10-CM

## 2020-09-24 DIAGNOSIS — M53.3 CHRONIC SACROILIAC PAIN: Primary | ICD-10-CM

## 2020-09-24 DIAGNOSIS — G89.29 CHRONIC SACROILIAC PAIN: Primary | ICD-10-CM

## 2020-09-24 DIAGNOSIS — M35.3 PMR (POLYMYALGIA RHEUMATICA) (HCC): ICD-10-CM

## 2020-09-24 PROCEDURE — 3017F COLORECTAL CA SCREEN DOC REV: CPT | Performed by: PHYSICAL MEDICINE & REHABILITATION

## 2020-09-24 PROCEDURE — G8536 NO DOC ELDER MAL SCRN: HCPCS | Performed by: PHYSICAL MEDICINE & REHABILITATION

## 2020-09-24 PROCEDURE — G8427 DOCREV CUR MEDS BY ELIG CLIN: HCPCS | Performed by: PHYSICAL MEDICINE & REHABILITATION

## 2020-09-24 PROCEDURE — G8753 SYS BP > OR = 140: HCPCS | Performed by: PHYSICAL MEDICINE & REHABILITATION

## 2020-09-24 PROCEDURE — G8400 PT W/DXA NO RESULTS DOC: HCPCS | Performed by: PHYSICAL MEDICINE & REHABILITATION

## 2020-09-24 PROCEDURE — G8754 DIAS BP LESS 90: HCPCS | Performed by: PHYSICAL MEDICINE & REHABILITATION

## 2020-09-24 PROCEDURE — 1101F PT FALLS ASSESS-DOCD LE1/YR: CPT | Performed by: PHYSICAL MEDICINE & REHABILITATION

## 2020-09-24 PROCEDURE — 99214 OFFICE O/P EST MOD 30 MIN: CPT | Performed by: PHYSICAL MEDICINE & REHABILITATION

## 2020-09-24 PROCEDURE — G8417 CALC BMI ABV UP PARAM F/U: HCPCS | Performed by: PHYSICAL MEDICINE & REHABILITATION

## 2020-09-24 PROCEDURE — 1090F PRES/ABSN URINE INCON ASSESS: CPT | Performed by: PHYSICAL MEDICINE & REHABILITATION

## 2020-09-24 PROCEDURE — G8510 SCR DEP NEG, NO PLAN REQD: HCPCS | Performed by: PHYSICAL MEDICINE & REHABILITATION

## 2020-09-24 NOTE — LETTER
9/25/20 Patient: Mehran Arriaga YOB: 1945 Date of Visit: 9/24/2020 William Vargas MD 
[address] [place] [address] VIA Facsimile: [phone] Dear William Vargas MD, Thank you for referring Ms. Dane Amador to [address] for evaluation. My notes for this consultation are attached. If you have questions, please do not hesitate to call me. I look forward to following your patient along with you. Sincerely, Saad Duenas MD

## 2020-09-24 NOTE — PROGRESS NOTES
[facility]  [address] [place], [facility] [address]  Phone: [phone]  Fax: [phone]    Pt's YOB: 1945    ASSESSMENT   Diagnoses and all orders for this visit:    1. Chronic sacroiliac pain    2. Muscle spasm    3. Spinal stenosis of lumbar region with neurogenic claudication    4. Cervical facet joint syndrome    5. DDD (degenerative disc disease), cervical    6. PMR (polymyalgia rheumatica) ([facility])    7. Myofascial pain    8. Chronic anticoagulation         IMPRESSION AND PLAN:  Ilsa Paulson is a 69 y.o. female with history of chronic lumbar pain. Pt complains of pain and stiffness in the neck and lower back, particularly at night when laying down. She reports new intermittent sharp stabbing pain in the right lower back/buttock. Pt has polymyalgia rheumatica and is currently on prednisone 5 mg. She also uses Lidoderm patches at night which helps with sleep. 1) Pt was given information on lumbar arthritis exercises. 2) She is not a candidate for NSAID's due to chronic anticoagulation with Eliquis. 3) I recommended the patient try water exercise, solomon chi, and chair yoga. 4) I also recommended that the patient lose weight. 5) Ms. Maria Fernanda Garcia has a reminder for a \"due or due soon\" health maintenance. I have asked that she contact her primary care provider, Allison Travis MD, for follow-up on this health maintenance. 6)  demonstrated consistency with prescribing. 7) Pt will continue with prednisone 5 mg daily  Follow-up and Dispositions    · Return in about 2 months (around 11/24/2020). 25 minutes of face to face contact was spent with the patient during today's office visit extensively discussing her symptoms, previous injections and medications, previous surgery, use of prednisone, upcoming and current treatment plan.     HISTORY OF PRESENT ILLNESS:  Ilsa Paulson is a 69 y.o. female with history of chronic lumbar pain and presents to the office today for follow up. Pt complains of pain and stiffness in the neck and lower back, particularly at night when laying down. She reports new intermittent sharp stabbing pain in the right lower back/buttock. Pt notes that previously her pain was more severe on the left, but over the last 2 weeks she has experienced severe right-sided pain. She admits to weakness in the legs and difficulty with ambulation. Pt notes that she joined a gym ([employer] in [place]) and uses the elliptical for 30-40 minutes for 2-3 x a week with benefit. She had previous lumbar surgery with Dr. Antonio Servin. Pt notes that she is scheduled to followed up with Dr. Joslyn Salmon for surgical consult and admits that she did not have a good experience when she was previously treated at [place]. She has polymyalgia rheumatica and is currently on prednisone 5 mg. Pt also uses Lidoderm patches at night which helps with sleep. Pt notes that she has TEN's unit at home. Pt at this time desires to continue with current care.     Pain Scale: 3/10    PCP: Reyes Del Toro MD     Past Medical History:   Diagnosis Date    Arthritis     Hypercholesterolemia     Hypertension     Ill-defined condition     osteopenia    Migraine     PMR (polymyalgia rheumatica) ([facility])     Thyroid disease         Social History     Socioeconomic History    Marital status:      Spouse name: Not on file    Number of children: Not on file    Years of education: Not on file    Highest education level: Not on file   Occupational History    Not on file   Social Needs    Financial resource strain: Not on file    Food insecurity     Worry: Not on file     Inability: Not on file    Transportation needs     Medical: Not on file     Non-medical: Not on file   Tobacco Use    Smoking status: Former Smoker     Packs/day: 1.00     Years: 5.00     Pack years: 5.00     Last attempt to quit: 1970     Years since quittin.7    Smokeless tobacco: Never Used   Substance and Sexual Activity    Alcohol use: Yes     Alcohol/week: 0.8 standard drinks     Types: 1 Glasses of wine per week     Comment: 4 nights a week    Drug use: No    Sexual activity: Not on file     Comment: Hysterectomy   Lifestyle    Physical activity     Days per week: Not on file     Minutes per session: Not on file    Stress: Not on file   Relationships    Social connections     Talks on phone: Not on file     Gets together: Not on file     Attends Rastafarian service: Not on file     Active member of club or organization: Not on file     Attends meetings of clubs or organizations: Not on file     Relationship status: Not on file    Intimate partner violence     Fear of current or ex partner: Not on file     Emotionally abused: Not on file     Physically abused: Not on file     Forced sexual activity: Not on file   Other Topics Concern    Not on file   Social History Narrative    Not on file       Current Outpatient Medications   Medication Sig Dispense Refill    lidocaine (LIDODERM) 5 % Apply patch to the affected area for 12 hours a day and remove for 12 hours a day. 60 Patch 2    thyroid, Pork, (ARMOUR THYROID) 60 mg tablet Take 60 mg by mouth daily. Plus 50 mg qdaily      apixaban (ELIQUIS) 5 mg tablet Take 5 mg by mouth two (2) times a day.  flecainide acetate (FLECAINIDE PO) Take  by mouth two (2) times a day.  predniSONE (DELTASONE) 1 mg tablet Take 3 mg by mouth daily.  DULoxetine (CYMBALTA) 60 mg capsule Take 60 mg by mouth daily.  acetaminophen (TYLENOL EXTRA STRENGTH) 500 mg tablet Take  by mouth every six (6) hours as needed for Pain.  metoprolol succinate (TOPROL-XL) 25 mg XL tablet Take 50 mg by mouth daily. Take 1 tab po q am, take 1 tab po q pm  0    rosuvastatin (CRESTOR) 20 mg tablet Take 20 mg by mouth nightly.  cycloSPORINE (RESTASIS) 0.05 % ophthalmic emulsion Administer 1 Drop to both eyes two (2) times a day.  benazepril (LOTENSIN) 40 mg tablet Take 40 mg by mouth daily.  ALPRAZolam (XANAX) 0.5 mg tablet Take 0.5 mg by mouth.  pregabalin (LYRICA) 75 mg capsule Take 1 cap by mouth at night for 1 week, then increase to 2 as directed. (Patient not taking: Reported on 6/1/2020) 60 Cap 1    buPROPion XL (WELLBUTRIN XL) 150 mg tablet Take 150 mg by mouth daily.  diclofenac (FLECTOR) 1.3 % pt12 1 Patch by TransDERmal route every twelve (12) hours every twelve (12) hours. Apply to left hip. 60 Patch 5       Allergies   Allergen Reactions    Adhesive Tape-Silicones Other (comments)    Levaquin [Levofloxacin] Nausea and Vomiting         REVIEW OF SYSTEMS    Constitutional: Negative for fever, chills, or weight change. Respiratory: Negative for cough or shortness of breath. Cardiovascular: Negative for chest pain or palpitations. Gastrointestinal: Negative for acid reflux, change in bowel habits, or constipation. Genitourinary: Negative for dysuria and flank pain. Musculoskeletal: Positive for cervical and lumbar pain. Neurological: Negative for headaches, dizziness, or numbness. Psychiatric/Behavioral: Positive for difficulty with sleep. As per HPI     PHYSICAL EXAMINATION  Visit Vitals  BP (!) 147/72 (BP 1 Location: Left arm)   Pulse (!) 58   Temp 97 °F (36.1 °C)   Resp 17   Ht 5' 2\" (1.575 m)   Wt 195 lb (88.5 kg)   SpO2 99%   BMI 35.67 kg/m²       Constitutional: Awake, alert, and in no acute distress. Neurological: 1+ symmetrical DTRs in the upper extremities. 1+ symmetrical DTRs in the lower extremities. Sensation to light touch is intact. Negative Hart's sign bilaterally. Skin: warm, dry, and intact. Musculoskeletal: Tenderness to palpation in the lower lumbar region and over the sacroiliac joints. Moderate pain with extension and axial loading. Improvement with forward flexion. No pain with internal or external rotation of her hips. Negative straight leg raise bilaterally. Biceps  Triceps Deltoids Wrist Ext Wrist Flex Hand Intrin   Right +4/5 +4/5 +4/5 +4/5 +4/5 +4/5   Left +4/5 +4/5 +4/5 +4/5 +4/5 +4/5      Hip Flex  Quads Hamstrings Ankle DF EHL Ankle PF   Right +4/5 +4/5 +4/5 +4/5 +4/5 +4/5   Left +4/5 +4/5 +4/5 +4/5 +4/5 +4/5     IMAGING:    Lumbar CT from 9/14/2020 was personally reviewed with the patient and demonstrated:  FINDINGS:     The imaged lung bases are clear. No retroperitoneal lymphadenopathy. Imaged portions of the kidneys, spleen, adrenal glands, and liver are normal.    Spinal cord terminates at the L1 level. Posterior fusion spanning L2-L5 with bilateral pedicle screws, interlocking vertical bars and multilevel laminotomies. No perihardware lucency to suggest loosening or infection. The left L5 pedicle screw is with its tip just outside the anterior cortex of L5. No fluid collection within the surgical bed. Vertebral body heights are preserved. At T10-T11, there is a mild broad-based disc osteophyte complex without high-grade central canal or foraminal narrowing. At T11-T12, there is a mild broad-based disc osteophyte complex without high-grade central canal or foraminal narrowing. At T12-L1, there is a mild broad-based disc osteophyte complex without high-grade central canal or foraminal narrowing. L1-L2: There is disc desiccation with vacuum disc phenomenon and moderate intervertebral disc space narrowing. A mild broad-based disc bulge with mild bilateral foraminal narrowing. Mild bilateral facet arthropathy. Mild central canal narrowing at this level. L2-L3: There is broad-based disc osteophyte complex superimposed on mild/moderate facet arthropathy. Mild central canal narrowing. Mild bilateral foraminal narrowing. Fredrick Dimitri L3-L4: No high-grade central canal or foraminal narrowing. L4-L5: There is grade 1 anterolisthesis of L4 with respect L5 with slight distortion of the thecal sac , without high-grade central canal narrowing. Mild/moderate right foraminal narrowing. Mild left foraminal narrowing. Lesle Gulling L5-S1: There is disc desiccation, vacuum disc phenomenon and moderate intervertebral disc space narrowing. Moderate/severe left facet arthropathy with synovial air. Mild/moderate right facet arthropathy. Asymmetric left moderate foraminal narrowing. Mild/moderate right foraminal narrowing. Small focus of air related to right facet arthropathy is noted within the right subarticular recess. Mild central canal narrowing at this level. IMPRESSION    Posterior fusion spanning L2-L5 without evidence of hardware complication. Disproportionate discogenic degenerative changes at L1-L2 and L5-S1. Foraminal narrowing is most advanced at L5-S1. Facet arthropathy is most advanced on the left at L5-S1. No high-grade central canal narrowing throughout the lumbar spinal column. Please refer to the body of the report for a comprehensive segmental analysis of the lumbar spinal column. Cervical spine 2V x-rays from 7/27/2020 were personally reviewed with the patient and demonstrated:  Diffuse degenerative changes with listhesis at C2-3.      Lumbar spine MRI from 02/24/2020 was personally reviewed with the patient and demonstrated:          Results from [facility] on 02/24/20   MRI LUMB SPINE WO CONT     Narrative MR lumbar spine without contrast     HISTORY: Chronic lumbar pain, acute bilateral low back pain with right-sided  sciatica, right leg weakness, previous lumbar fusion.     COMPARISON: MRI 9/25/2015     TECHNIQUE: Lumbar spine scanned with axial and sagittal T1W scans, sagittal  STIR, axial and sagittal T2W scans.     FINDINGS: L2-L5 posterolateral fusion. Stable grade 1 anterolisthesis of L4 on  L5. Mild dextroscoliosis. Normal vertebral body heights. Heterogeneous marrow  signal on the basis of endplate degenerative changes. L1 vertebral hemangioma.   The conus medullaris is normal in signal and caliber ending at the T12-L1 disc  level. No clumping of the cauda equina nerve roots. Postoperative changes in the  paraspinal soft tissues. No fluid collection or mass.     Spinal canal and neural foramen: Limited visualization due to hardware related  artifact. Bulging disc and osteophyte complexes at T9-10 and T10-11 without  significant spinal canal stenosis.      T12-L1: Tiny left central disc protrusion, new. No spinal canal or foraminal  stenosis.     L1-2: Significant worsening of degenerative disc and facet disease. Disc is  narrowed with bulging disc and osteophyte complex. Superimposed left central  disc protrusion. Moderate facet and ligamentum flavum hypertrophy with trace  bilateral facet effusions. Mild to moderate spinal canal and right greater than  left foraminal stenoses. No exiting nerve root compression.     L2-3: Similar disc space narrowing with disc osteophyte complex and facet  arthropathy with ligamentum flavum thickening. Mild spinal canal and foraminal  stenoses, stable.     L3-4: Similar disc space narrowing. Effective decompression of the spinal canal  with no residual stenosis. Widely patent left neural foramen but there is  similar degree of moderate right foraminal stenosis.     L4-5: Similar disc space narrowing and disc bulge. Spinal canal is effectively  decompressed with no significant residual stenosis. Mild foraminal stenoses  without nerve root compression, stable.      L5-S1: Disc bulge and facet hypertrophy. No spinal canal stenosis. Stable mild  to moderate left foraminal stenosis.       Impression IMPRESSION:     1.  Interval worsening of degenerative spinal disease most prominent at L1-2  where there is disc osteophyte complex and superimposed left central disc  protrusion producing mild to moderate spinal canal stenosis and right greater  than left foraminal stenoses. 2.  New tiny left central disc protrusion at T12-L1 without spinal stenosis.   3.  Effective decompression of the L3-4 and L4-5 spinal canal with mild residual  spinal stenosis at L2-3. Written by Amy Machuca, as dictated by Nannette Webber MD.  I, Dr. Nannette Webber confirm that all documentation is accurate.

## 2020-09-24 NOTE — PATIENT INSTRUCTIONS
Low Back Arthritis: Exercises Introduction Here are some examples of typical rehabilitation exercises for your condition. Start each exercise slowly. Ease off the exercise if you start to have pain. Your doctor or physical therapist will tell you when you can start these exercises and which ones will work best for you. When you are not being active, find a comfortable position for rest. Some people are comfortable on the floor or a medium-firm bed with a small pillow under their head and another under their knees. Some people prefer to lie on their side with a pillow between their knees. Don't stay in one position for too long. Take short walks (10 to 20 minutes) every 2 to 3 hours. Avoid slopes, hills, and stairs until you feel better. Walk only distances you can manage without pain, especially leg pain. How to do the exercises Pelvic tilt 1. Lie on your back with your knees bent. 2. \"Brace\" your stomachtighten your muscles by pulling in and imagining your belly button moving toward your spine. 3. Press your lower back into the floor. You should feel your hips and pelvis rock back. 4. Hold for 6 seconds while breathing smoothly. 5. Relax and allow your pelvis and hips to rock forward. 6. Repeat 8 to 12 times. Back stretches 1. Get down on your hands and knees on the floor. 2. Relax your head and allow it to droop. Round your back up toward the ceiling until you feel a nice stretch in your upper, middle, and lower back. Hold this stretch for as long as it feels comfortable, or about 15 to 30 seconds. 3. Return to the starting position with a flat back while you are on your hands and knees. 4. Let your back sway by pressing your stomach toward the floor. Lift your buttocks toward the ceiling. 5. Hold this position for 15 to 30 seconds. 6. Repeat 2 to 4 times. Follow-up care is a key part of your treatment and safety.  Be sure to make and go to all appointments, and call your doctor if you are having problems. It's also a good idea to know your test results and keep a list of the medicines you take. Where can you learn more? Go to http://www.gray.com/ Enter L100 in the search box to learn more about \"Low Back Arthritis: Exercises. \" Current as of: March 2, 2020               Content Version: 12.6 © 2006-2020 [employer]. Care instructions adapted under license by [employer] (which disclaims liability or warranty for this information). If you have questions about a medical condition or this instruction, always ask your healthcare professional. [address] any warranty or liability for your use of this information.

## 2020-10-20 ENCOUNTER — PATIENT MESSAGE (OUTPATIENT)
Dept: ORTHOPEDIC SURGERY | Age: 75
End: 2020-10-20

## 2020-11-04 ENCOUNTER — HOSPITAL ENCOUNTER (OUTPATIENT)
Age: 75
Setting detail: OUTPATIENT SURGERY
Discharge: HOME | End: 2020-11-04
Attending: PHYSICAL MEDICINE & REHABILITATION | Admitting: PHYSICAL MEDICINE & REHABILITATION
Payer: MEDICARE

## 2020-11-04 ENCOUNTER — APPOINTMENT (OUTPATIENT)
Dept: GENERAL RADIOLOGY | Age: 75
End: 2020-11-04
Attending: PHYSICAL MEDICINE & REHABILITATION
Payer: MEDICARE

## 2020-11-04 VITALS
HEART RATE: 79 BPM | DIASTOLIC BLOOD PRESSURE: 68 MMHG | OXYGEN SATURATION: 93 % | TEMPERATURE: 98.8 F | SYSTOLIC BLOOD PRESSURE: 148 MMHG | RESPIRATION RATE: 20 BRPM

## 2020-11-04 PROCEDURE — 74011000636 HC RX REV CODE- 636: Performed by: PHYSICAL MEDICINE & REHABILITATION

## 2020-11-04 PROCEDURE — 77030003676 HC NDL SPN MPRI -A: Performed by: PHYSICAL MEDICINE & REHABILITATION

## 2020-11-04 PROCEDURE — 77030039433 HC TY MYLEOGRAM BD -B: Performed by: PHYSICAL MEDICINE & REHABILITATION

## 2020-11-04 PROCEDURE — 2709999900 HC NON-CHARGEABLE SUPPLY: Performed by: PHYSICAL MEDICINE & REHABILITATION

## 2020-11-04 PROCEDURE — 74011250636 HC RX REV CODE- 250/636: Performed by: PHYSICAL MEDICINE & REHABILITATION

## 2020-11-04 PROCEDURE — 76010000009 HC PAIN MGT 0 TO 30 MIN PROC: Performed by: PHYSICAL MEDICINE & REHABILITATION

## 2020-11-04 PROCEDURE — 74011000250 HC RX REV CODE- 250: Performed by: PHYSICAL MEDICINE & REHABILITATION

## 2020-11-04 RX ORDER — DEXAMETHASONE SODIUM PHOSPHATE 100 MG/10ML
INJECTION INTRAMUSCULAR; INTRAVENOUS AS NEEDED
Status: DISCONTINUED | OUTPATIENT
Start: 2020-11-04 | End: 2020-11-04 | Stop reason: HOSPADM

## 2020-11-04 RX ORDER — DIAZEPAM 5 MG/1
5-20 TABLET ORAL ONCE
Status: DISCONTINUED | OUTPATIENT
Start: 2020-11-04 | End: 2020-11-04 | Stop reason: HOSPADM

## 2020-11-04 RX ORDER — LIDOCAINE HYDROCHLORIDE 10 MG/ML
INJECTION, SOLUTION EPIDURAL; INFILTRATION; INTRACAUDAL; PERINEURAL AS NEEDED
Status: DISCONTINUED | OUTPATIENT
Start: 2020-11-04 | End: 2020-11-04 | Stop reason: HOSPADM

## 2020-11-04 NOTE — DISCHARGE INSTRUCTIONS
[facility]   ([facility])  Dr. Honor Dakins, Dr. Luis Kraus, Dr. Estefany Patel Spinal Procedure (Block) Instructions    * Do not drive a car, operate heavy machinery or dangerous equipment, or make important decisions for 12-24 hours. * Light activity as tolerated; may rest for the remainder of the day. * Resume pre-block medications including those from your other doctors. * Do not drink alcoholic beverages for 24 hours. Alcohol and the medications you have received may interact and cause an adverse reaction. * You may feel better this evening and worse tomorrow, as the numbing medications wears off and the steroid has yet to begin to work. After 48-72 hrs the steroid should begin to release bringing you relief. If you had a medial branch block, no steroids were used. The medial branch block is a test to see if you are a candidate for radiofrequency ablation (RFA). The anesthetic (numbing medicine)  will wear off by the next day. * You may shower this evening and remove any bandages. * Avoid hot tubs/pools/tub soaks and heating pads for 24 hours. You may use cold packs on the procedure site as tolerated for the first 24 hours. * If a headache develops, drink plenty of fluids and rest.  Take over the counter medications for headache if needed. If the headache continues longer than 24 hours, call MD at the [address] [place] [address] Avenue. [phone]    * Continue taking pain medications as needed. * You may resume your regular diet if tolerated. Otherwise, start with sips of water and advance slowly. * If Diabetic: check your blood sugar three times a day for the next 3 days. If your sugar is greater than 300 call your family doctor. If your sugar is greater than 400, have someone transport you to the nearest Emergency Room. * If you experience any of the following problems, Please Call the [address] [place] [address] Avenue at [phone].         * Excessive pain, swelling, redness or odor at or around the surgical area    * Fever of 101 or higher    * Nausea / Vomiting lasting longer than 4 hours or if unable to take medications. * Severe Headache    * Weakness or numbness in arms or legs that is not      resolving   * Any NEW signs of decreased circulation or nerve impairment in leg: change in color, swelling, persistent numbness, tingling                    * Prolonged increase in pain greater than 4 days      PATIENT INSTRUCTIONS:    After oral sedation, for 12-24 hours or while taking prescription Narcotics:  · Limit your activities  · Do not drive and operate hazardous machinery  · Do not make important personal or business decisions  · Do  not drink alcoholic beverages  · If you have not urinated within 8 hours after discharge, please contact your surgeon on call. *  Please give a list of your current medications to your Primary Care Provider. *  Please update this list whenever your medications are discontinued, doses are      changed, or new medications (including over-the-counter products) are added. *  Please carry medication information at all times in case of emergency situations. These are general instructions for a healthy lifestyle:    No smoking/ No tobacco products/ Avoid exposure to second hand smoke    Surgeon General's Warning:  Quitting smoking now greatly reduces serious risk to your health. Obesity, smoking, and sedentary lifestyle greatly increases your risk for illness    A healthy diet, regular physical exercise & weight monitoring are important for maintaining a healthy lifestyle    You may be retaining fluid if you have a history of heart failure or if you experience any of the following symptoms:  Weight gain of 3 pounds or more overnight or 5 pounds in a week, increased swelling in our hands or feet or shortness of breath while lying flat in bed.   Please call your doctor as soon as you notice any of these symptoms; do not wait until your next office visit. Recognize signs and symptoms of STROKE:    F-face looks uneven    A-arms unable to move or move unevenly    S-speech slurred or non-existent    T-time-call 911 as soon as signs and symptoms begin-DO NOT go       Back to bed or wait to see if you get better-TIME IS BRAIN.

## 2020-11-04 NOTE — PROCEDURES
Bi Procedure Note    Patient Name: Marcel Wilson    Date of Procedure: November 4, 2020    Preoperative Diagnosis:      Post Operative Diagnosis: same    Procedure: SI Joint Injection bilateral      Consent: Informed consent was obtained prior to the procedure. The patient was given the opportunity to ask questions regarding the procedure and its associated risks. In addition to the potential risks associated with the procedure itself, the patient was informed both verbally and in writing of potential side effects of the use of glucocorticoids. The patient appeared to comprehend the informed consent and desired to have the procedure performed. Procedure: The patient was placed in the prone position on the flouroscopy table and the back was prepped and draped in the usual sterile manner. A #22 gauge spinal needle was then advanced to lie within the SI joint after local Lidocaine 1% injection. The procedure was repeated for the contralateral SI joint. A total of 10 mg of preservative free dexamethasone and 5 ml of Lidocaine was introduced into and around theSI joints. The injection area was cleaned and bandaids applied. No excessive bleeding was noted. Patient dressed and was discharged to home with instructions. Discussion:  (x) The patient tolerated the procedure well.     ( )       Fior Du MD  November 4, 2020

## 2020-11-29 DIAGNOSIS — G89.29 OTHER CHRONIC PAIN: ICD-10-CM

## 2020-11-29 DIAGNOSIS — M47.816 LUMBAR FACET ARTHROPATHY: ICD-10-CM

## 2020-11-29 DIAGNOSIS — M54.16 LEFT LUMBAR RADICULOPATHY: ICD-10-CM

## 2020-11-29 DIAGNOSIS — M48.062 SPINAL STENOSIS OF LUMBAR REGION WITH NEUROGENIC CLAUDICATION: ICD-10-CM

## 2020-11-30 RX ORDER — LIDOCAINE 50 MG/G
PATCH TOPICAL
Qty: 60 EACH | Refills: 5 | Status: SHIPPED | OUTPATIENT
Start: 2020-11-30

## 2020-12-02 NOTE — PROGRESS NOTES
[facility]  [address] [place], [facility] [place] [facility] [place] [address]  Phone: [phone]  Fax: [phone]    Pt's YOB: 1945    ASSESSMENT   Diagnoses and all orders for this visit:    1. Chronic sacroiliac pain    2. Muscle spasm    3. Spinal stenosis of lumbar region with neurogenic claudication    4. Cervical facet joint syndrome    5. PMR (polymyalgia rheumatica) ([facility])    6. Chronic anticoagulation         IMPRESSION AND PLAN:  Winter Hope is a 70 y.o. female with history of chronic lumbar pain. Pt reports significant relief lasting 1 week with bilateral sacroiliac joint injections on 11/4/2020. She followed up with Dr. Isai Serrano when her pain returned and she has discussed surgical intervention. She has polymyalgia rheumatica and is currently on prednisone. Pt uses Lidoderm patches as needed at night which helps with sleep. 1) Pt was given information on cervical and lumbar arthritis exercises. 2) She will continue using Lidoderm patches as needed. 3) Pt is not a candidate for NSAID's due to chronic anticoagulation with Eliquis. 4) Discussed ordering a cervical MRI at her next office visit if needed. Pat declined at this time. 5) Ms. Marylee Channel has a reminder for a \"due or due soon\" health maintenance. I have asked that she contact her primary care provider, Zoë Asif MD, for follow-up on this health maintenance. 6)  demonstrated consistency with prescribing. Follow-up and Dispositions    · Return in about 2 months (around 2/3/2021) for follow up. HISTORY OF PRESENT ILLNESS:  Winter Hope is a 70 y.o. female with history of chronic lumbar pain and presents to the office today for follow up. Pt reports significant relief lasting 1 week with bilateral sacroiliac joint injections on 11/4/2020. She followed up with Dr. Isai Serrano when her pain returned and she has discussed surgical intervention.  She has been diagnosed with anterior tibialis tendonitis in the left leg. Pt is followed by a podiatrist, Dr. Pily Purvis and notes relief with injections and orthotics but reports that a recent MRI demonstrated a complete rupture of the tibialis anterior tendon. She admits to difficulty with ambulation secondary to her foot pain and thinks this is also contributing to her lower back pain. Pt also complains of continued pain in the neck, R>L. She admits to issues with balance but denies any weakness in the arms a this time. Pt previously attended cervical physical therapy and was prescribed a home traction unti. She has polymyalgia rheumatica and is currently on prednisone. Pt is also on Eliquis. She uses Lidoderm patches as needed at night which helps with sleep. Pt likes to stay active, goes to the gym, and uses an elliptical 3-4 x a week. Pt at this time desires to continue with current care. Pain Scale: 7/10    PCP: Milagros Mathew MD     Past Medical History:   Diagnosis Date    Arthritis     Hypercholesterolemia     Hypertension     Ill-defined condition     osteopenia    Migraine     PM[facility]ym[facility]a) ([facility])     Thyroid disease         Social History     Socioeconomic History    Marital status:      Spouse name: Not on file    Number of children: Not on file    Years of education: Not on file    Highest education level: Not on file   Occupational History    Not on file   Social Needs    Financial resource strain: Not on file    Food insecurity     Worry: Not on file     Inability: Not on file    Transportation needs     Medical: Not on file     Non-medical: Not on file   Tobacco Use    Smoking status: Former Smoker     Packs/day: 1.00     Years: 5.00     Pack years: 5.00     Last attempt to quit: 1970     Years since quittin.9    Smokeless tobacco: Never Used   Substance and Sexual Activity    Alcohol use:  Yes     Alcohol/week: 0.8 standard drinks     Types: 1 Glasses of wine per week     Comment: 4 nights a week    Drug use: No    Sexual activity: Not on file     Comment: Hysterectomy   Lifestyle    Physical activity     Days per week: Not on file     Minutes per session: Not on file    Stress: Not on file   Relationships    Social connections     Talks on phone: Not on file     Gets together: Not on file     Attends Evangelical service: Not on file     Active member of club or organization: Not on file     Attends meetings of clubs or organizations: Not on file     Relationship status: Not on file    Intimate partner violence     Fear of current or ex partner: Not on file     Emotionally abused: Not on file     Physically abused: Not on file     Forced sexual activity: Not on file   Other Topics Concern    Not on file   Social History Narrative    Not on file       Current Outpatient Medications   Medication Sig Dispense Refill    lidocaine (LIDODERM) 5 % APPLY PATCH TO THE AFFECTED AREA FOR 12 HOURS A DAY AND REMOVE FOR 12 HOURS A DAY 60 Each 5    thyroid, Pork, (ARMOUR THYROID) 60 mg tablet Take 60 mg by mouth daily. Plus 50 mg qdaily      diclofenac (FLECTOR) 1.3 % pt12 1 Patch by TransDERmal route every twelve (12) hours every twelve (12) hours. Apply to left hip. 60 Patch 5    apixaban (ELIQUIS) 5 mg tablet Take 5 mg by mouth two (2) times a day.  flecainide acetate (FLECAINIDE PO) Take  by mouth two (2) times a day.  predniSONE (DELTASONE) 1 mg tablet Take 3 mg by mouth daily.  DULoxetine (CYMBALTA) 60 mg capsule Take 60 mg by mouth daily.  acetaminophen (TYLENOL EXTRA STRENGTH) 500 mg tablet Take  by mouth every six (6) hours as needed for Pain.  metoprolol succinate (TOPROL-XL) 25 mg XL tablet Take 50 mg by mouth daily. Take 1 tab po q am, take 1 tab po q pm  0    rosuvastatin (CRESTOR) 20 mg tablet Take 20 mg by mouth nightly.  cycloSPORINE (RESTASIS) 0.05 % ophthalmic emulsion Administer 1 Drop to both eyes two (2) times a day.       benazepril (LOTENSIN) 40 mg tablet Take 40 mg by mouth daily.  ALPRAZolam (XANAX) 0.5 mg tablet Take 0.5 mg by mouth.  pregabalin (LYRICA) 75 mg capsule Take 1 cap by mouth at night for 1 week, then increase to 2 as directed. (Patient not taking: Reported on 6/1/2020) 60 Cap 1    buPROPion XL (WELLBUTRIN XL) 150 mg tablet Take 150 mg by mouth daily. Allergies   Allergen Reactions    Adhesive Tape-Silicones Other (comments)    Levaquin [Levofloxacin] Nausea and Vomiting         REVIEW OF SYSTEMS    Constitutional: Negative for fever, chills, or weight change. Respiratory: Negative for cough or shortness of breath. Cardiovascular: Negative for chest pain or palpitations. Gastrointestinal: Negative for acid reflux, change in bowel habits, or constipation. Genitourinary: Negative for dysuria and flank pain. Musculoskeletal: Positive for cervical and lumbar pain. Neurological: Negative for headaches, dizziness, or numbness. Endo/Heme/Allergies: Negative for increased bruising. Psychiatric/Behavioral: Positive for difficulty with sleep. As per HPI    PHYSICAL EXAMINATION  Visit Vitals  /67 (BP 1 Location: Left arm, BP Patient Position: Sitting)   Pulse 66   Temp 97.4 °F (36.3 °C) (Skin)   Resp 16   Ht 5' 2\" (1.575 m)   Wt 194 lb (88 kg)   SpO2 99%   BMI 35.48 kg/m²       Constitutional: Awake, alert, and in no acute distress. Neurological: 1+ symmetrical DTRs in the upper extremities. 1+ symmetrical DTRs in the lower extremities. Sensation to light touch is intact. Negative Hart's sign bilaterally. Skin: warm, dry, and intact. Musculoskeletal: Very tight across the upper trapezius bilaterally, R>L. Decreased range of motion with side to side cervical flexion. Tenderness to palpation in the lower lumbar region and over the sacroiliac joints. Moderate pain with extension and axial loading. Improvement with forward flexion.  No pain with internal or external rotation of her hips. Negative straight leg raise bilaterally. Patient ambulates with the assistance of a single point cane. Biceps  Triceps Deltoids Wrist Ext Wrist Flex Hand Intrin   Right +4/5 +4/5 +4/5 +4/5 +4/5 +4/5   Left +4/5 +4/5 +4/5 +4/5 +4/5 +4/5      Hip Flex  Quads Hamstrings Ankle DF EHL Ankle PF   Right +4/5 +4/5 +4/5 +4/5 +4/5 +4/5   Left +4/5 +4/5 +4/5 +4/5 +4/5 +4/5     IMAGING:    Lumbar CT from 9/14/2020 was personally reviewed with the patient and demonstrated:  FINDINGS:     The imaged lung bases are clear. No retroperitoneal lymphadenopathy. Imaged portions of the kidneys, spleen, adrenal glands, and liver are normal.    Spinal cord terminates at the L1 level. Posterior fusion spanning L2-L5 with bilateral pedicle screws, interlocking vertical bars and multilevel laminotomies. No perihardware lucency to suggest loosening or infection. The left L5 pedicle screw is with its tip just outside the anterior cortex of L5. No fluid collection within the surgical bed. Vertebral body heights are preserved. At T10-T11, there is a mild broad-based disc osteophyte complex without high-grade central canal or foraminal narrowing. At T11-T12, there is a mild broad-based disc osteophyte complex without high-grade central canal or foraminal narrowing. At T12-L1, there is a mild broad-based disc osteophyte complex without high-grade central canal or foraminal narrowing. L1-L2: There is disc desiccation with vacuum disc phenomenon and moderate intervertebral disc space narrowing. A mild broad-based disc bulge with mild bilateral foraminal narrowing. Mild bilateral facet arthropathy. Mild central canal narrowing at this level. L2-L3: There is broad-based disc osteophyte complex superimposed on mild/moderate facet arthropathy. Mild central canal narrowing. Mild bilateral foraminal narrowing. Philbert Amanda L3-L4: No high-grade central canal or foraminal narrowing.       L4-L5: There is grade 1 anterolisthesis of L4 with respect L5 with slight distortion of the thecal sac , without high-grade central canal narrowing. Mild/moderate right foraminal narrowing. Mild left foraminal narrowing. Mally Dewitt L5-S1: There is disc desiccation, vacuum disc phenomenon and moderate intervertebral disc space narrowing. Moderate/severe left facet arthropathy with synovial air. Mild/moderate right facet arthropathy. Asymmetric left moderate foraminal narrowing. Mild/moderate right foraminal narrowing. Small focus of air related to right facet arthropathy is noted within the right subarticular recess. Mild central canal narrowing at this level. IMPRESSION    Posterior fusion spanning L2-L5 without evidence of hardware complication. Disproportionate discogenic degenerative changes at L1-L2 and L5-S1. Foraminal narrowing is most advanced at L5-S1. Facet arthropathy is most advanced on the left at L5-S1. No high-grade central canal narrowing throughout the lumbar spinal column. Please refer to the body of the report for a comprehensive segmental analysis of the lumbar spinal column.     Cervical spine 2V x-rays from 7/27/2020 were personally reviewed with the patient and demonstrated:  Diffuse degenerative changes with listhesis at C2-3.      Lumbar spine MRI from 02/24/2020 was personally reviewed with the patient and demonstrated:          Results from Hospital Encounter on 02/24/20   MRI LUMB SPINE WO CONT     Narrative MR lumbar spine without contrast     HISTORY: Chronic lumbar pain, acute bilateral low back pain with right-sided  sciatica, right leg weakness, previous lumbar fusion.     COMPARISON: MRI 9/25/2015     TECHNIQUE: Lumbar spine scanned with axial and sagittal T1W scans, sagittal  STIR, axial and sagittal T2W scans.     FINDINGS: L2-L5 posterolateral fusion. Stable grade 1 anterolisthesis of L4 on  L5. Mild dextroscoliosis. Normal vertebral body heights.  Heterogeneous marrow  signal on the basis of endplate degenerative changes. L1 vertebral hemangioma. The conus medullaris is normal in signal and caliber ending at the T12-L1 disc  level. No clumping of the cauda equina nerve roots. Postoperative changes in the  paraspinal soft tissues. No fluid collection or mass.     Spinal canal and neural foramen: Limited visualization due to hardware related  artifact. Bulging disc and osteophyte complexes at T9-10 and T10-11 without  significant spinal canal stenosis.      T12-L1: Tiny left central disc protrusion, new. No spinal canal or foraminal  stenosis.     L1-2: Significant worsening of degenerative disc and facet disease. Disc is  narrowed with bulging disc and osteophyte complex. Superimposed left central  disc protrusion. Moderate facet and ligamentum flavum hypertrophy with trace  bilateral facet effusions. Mild to moderate spinal canal and right greater than  left foraminal stenoses. No exiting nerve root compression.     L2-3: Similar disc space narrowing with disc osteophyte complex and facet  arthropathy with ligamentum flavum thickening. Mild spinal canal and foraminal  stenoses, stable.     L3-4: Similar disc space narrowing. Effective decompression of the spinal canal  with no residual stenosis. Widely patent left neural foramen but there is  similar degree of moderate right foraminal stenosis.     L4-5: Similar disc space narrowing and disc bulge. Spinal canal is effectively  decompressed with no significant residual stenosis. Mild foraminal stenoses  without nerve root compression, stable.      L5-S1: Disc bulge and facet hypertrophy. No spinal canal stenosis. Stable mild  to moderate left foraminal stenosis.         Impression IMPRESSION:     1.  Interval worsening of degenerative spinal disease most prominent at L1-2  where there is disc osteophyte complex and superimposed left central disc  protrusion producing mild to moderate spinal canal stenosis and right greater  than left foraminal stenoses. 2.  New tiny left central disc protrusion at T12-L1 without spinal stenosis. 3.  Effective decompression of the L3-4 and L4-5 spinal canal with mild residual  spinal stenosis at L2-3. Written by Tash Elam, as dictated by Aida St MD.  I, Dr. Aida St confirm that all documentation is accurate.

## 2020-12-03 ENCOUNTER — OFFICE VISIT (OUTPATIENT)
Dept: ORTHOPEDIC SURGERY | Age: 75
End: 2020-12-03
Payer: MEDICARE

## 2020-12-03 VITALS
HEIGHT: 62 IN | OXYGEN SATURATION: 99 % | RESPIRATION RATE: 16 BRPM | HEART RATE: 66 BPM | BODY MASS INDEX: 35.7 KG/M2 | TEMPERATURE: 97.4 F | DIASTOLIC BLOOD PRESSURE: 67 MMHG | SYSTOLIC BLOOD PRESSURE: 137 MMHG | WEIGHT: 194 LBS

## 2020-12-03 DIAGNOSIS — Z79.01 CHRONIC ANTICOAGULATION: ICD-10-CM

## 2020-12-03 DIAGNOSIS — M47.812 CERVICAL FACET JOINT SYNDROME: ICD-10-CM

## 2020-12-03 DIAGNOSIS — M35.3 PMR (POLYMYALGIA RHEUMATICA) (HCC): ICD-10-CM

## 2020-12-03 DIAGNOSIS — M48.062 SPINAL STENOSIS OF LUMBAR REGION WITH NEUROGENIC CLAUDICATION: ICD-10-CM

## 2020-12-03 DIAGNOSIS — M62.838 MUSCLE SPASM: ICD-10-CM

## 2020-12-03 DIAGNOSIS — G89.29 CHRONIC SACROILIAC PAIN: Primary | ICD-10-CM

## 2020-12-03 DIAGNOSIS — M53.3 CHRONIC SACROILIAC PAIN: Primary | ICD-10-CM

## 2020-12-03 PROCEDURE — G8536 NO DOC ELDER MAL SCRN: HCPCS | Performed by: PHYSICAL MEDICINE & REHABILITATION

## 2020-12-03 PROCEDURE — 3017F COLORECTAL CA SCREEN DOC REV: CPT | Performed by: PHYSICAL MEDICINE & REHABILITATION

## 2020-12-03 PROCEDURE — G8754 DIAS BP LESS 90: HCPCS | Performed by: PHYSICAL MEDICINE & REHABILITATION

## 2020-12-03 PROCEDURE — G8400 PT W/DXA NO RESULTS DOC: HCPCS | Performed by: PHYSICAL MEDICINE & REHABILITATION

## 2020-12-03 PROCEDURE — G8752 SYS BP LESS 140: HCPCS | Performed by: PHYSICAL MEDICINE & REHABILITATION

## 2020-12-03 PROCEDURE — G8432 DEP SCR NOT DOC, RNG: HCPCS | Performed by: PHYSICAL MEDICINE & REHABILITATION

## 2020-12-03 PROCEDURE — G8427 DOCREV CUR MEDS BY ELIG CLIN: HCPCS | Performed by: PHYSICAL MEDICINE & REHABILITATION

## 2020-12-03 PROCEDURE — G8417 CALC BMI ABV UP PARAM F/U: HCPCS | Performed by: PHYSICAL MEDICINE & REHABILITATION

## 2020-12-03 PROCEDURE — 99213 OFFICE O/P EST LOW 20 MIN: CPT | Performed by: PHYSICAL MEDICINE & REHABILITATION

## 2020-12-03 PROCEDURE — 1101F PT FALLS ASSESS-DOCD LE1/YR: CPT | Performed by: PHYSICAL MEDICINE & REHABILITATION

## 2020-12-03 PROCEDURE — 1090F PRES/ABSN URINE INCON ASSESS: CPT | Performed by: PHYSICAL MEDICINE & REHABILITATION

## 2020-12-03 NOTE — LETTER
12/4/20 Patient: Jaspal Barger YOB: 1945 Date of Visit: 12/3/2020 Tez Lanier MD 
[address] [place], [place] [id number] [phone] [facility] [address] VIA Facsimile: [phone] Dear Tez Lanier MD, Thank you for referring Ms. Sadie Hansen to [facility] for evaluation. My notes for this consultation are attached. If you have questions, please do not hesitate to call me. I look forward to following your patient along with you. Sincerely, Chana Cabrera MD

## 2020-12-03 NOTE — PATIENT INSTRUCTIONS

## 2021-04-14 NOTE — PROGRESS NOTES
[facility]  [address]   Phone: [phone]  Fax: [phone]    Pt's YOB: 1945    ASSESSMENT   Diagnoses and all orders for this visit:    1. Chronic right sacroiliac joint pain    2. Muscle spasm    3. Spinal stenosis of lumbar region with neurogenic claudication    4. Cervical facet joint syndrome    5. PMR (polymyalgia rheumatica) (HCC)    6. Chronic anticoagulation    7. Primary osteoarthritis of left shoulder         IMPRESSION AND PLAN:  Ines Osorio is a 70 y.o. female with history of chronic lumbar pain. Pt had a left SI joint fusion 7 weeks ago by Dr. Trang Braga with significant relief. She notes that she is now considering a right SI joint fusion since she experiences right-sided pain with ambulation. 1) Pt was given information on lumbar arthritis and sacroiliac exercises. 2) She is not a candidate for NSAID's due to chronic anticoagulation with Eliquis. 3) I recommended the patient try water exercise, solomon chi, and chair yoga and encouraged her to continue exercising regularly. 4) I recommended the patient use Voltaren gel on her left shoulder. 5) Ms. Eric Altman has a reminder for a \"due or due soon\" health maintenance. I have asked that she contact her primary care provider, Francisco Lowry MD, for follow-up on this health maintenance. 6)  demonstrated consistency with prescribing. Follow-up and Dispositions    · Return in about 4 months (around 8/15/2021), or follow up. HISTORY OF PRESENT ILLNESS:  Ines Osorio is a 70 y.o. female with history of chronic lumbar pain and presents to the office today for follow up. Pt had a left SI joint fusion 7 weeks ago by Dr. Trang Braga with significant relief. She notes that she is now considering a right SI joint fusion since she experiences right-sided pain with prolonged ambulation.  Pt reports that she is scheduled to follow up with Dr. Messi Blunt next week. She notes continued pain in the neck that radiates down the left arm, particularly when lying down. Pt reports that she will need a left shoulder replacement. She had a right shoulder replacement by a surgeon at the [facility]. Pt admits to recent weight loss, suspects this may be related to her thyroid, and plans to discuss her symptoms with her PCP, Allison Travis MD. Of note, she is currently on Eliquis. She likes to stay active, goes to the gym, and uses a treadmill for 20 minutes and elliptical for 30 minutes. Pt reports that she has received both doses of the COVID-19 vaccination. Pt at this time desires to continue with current care. Pain Scale: 3/10    PCP: Allison Travis MD     Past Medical History:   Diagnosis Date    Arthritis     Hypercholesterolemia     Hypertension     Ill-defined condition     osteopenia    Migraine     PMR (polymyalgia rheumatica) (HCC)     Thyroid disease         Social History     Socioeconomic History    Marital status:      Spouse name: Not on file    Number of children: Not on file    Years of education: Not on file    Highest education level: Not on file   Occupational History    Not on file   Social Needs    Financial resource strain: Not on file    Food insecurity     Worry: Not on file     Inability: Not on file    Transportation needs     Medical: Not on file     Non-medical: Not on file   Tobacco Use    Smoking status: Former Smoker     Packs/day: 1.00     Years: 5.00     Pack years: 5.00     Quit date: 1970     Years since quittin.3    Smokeless tobacco: Never Used   Substance and Sexual Activity    Alcohol use:  Yes     Alcohol/week: 0.8 standard drinks     Types: 1 Glasses of wine per week     Comment: 4 nights a week    Drug use: No    Sexual activity: Not on file     Comment: Hysterectomy   Lifestyle    Physical activity     Days per week: Not on file     Minutes per session: Not on file    Stress: Not on file   Relationships    Social connections     Talks on phone: Not on file     Gets together: Not on file     Attends Advent service: Not on file     Active member of club or organization: Not on file     Attends meetings of clubs or organizations: Not on file     Relationship status: Not on file    Intimate partner violence     Fear of current or ex partner: Not on file     Emotionally abused: Not on file     Physically abused: Not on file     Forced sexual activity: Not on file   Other Topics Concern    Not on file   Social History Narrative    Not on file       Current Outpatient Medications   Medication Sig Dispense Refill    lidocaine (LIDODERM) 5 % APPLY PATCH TO THE AFFECTED AREA FOR 12 HOURS A DAY AND REMOVE FOR 12 HOURS A DAY 60 Each 5    pregabalin (LYRICA) 75 mg capsule Take 1 cap by mouth at night for 1 week, then increase to 2 as directed. (Patient not taking: Reported on 6/1/2020) 60 Cap 1    buPROPion XL (WELLBUTRIN XL) 150 mg tablet Take 150 mg by mouth daily.  thyroid, Pork, (ARMOUR THYROID) 60 mg tablet Take 60 mg by mouth daily. Plus 50 mg qdaily      diclofenac (FLECTOR) 1.3 % pt12 1 Patch by TransDERmal route every twelve (12) hours every twelve (12) hours. Apply to left hip. 60 Patch 5    apixaban (ELIQUIS) 5 mg tablet Take 5 mg by mouth two (2) times a day.  flecainide acetate (FLECAINIDE PO) Take  by mouth two (2) times a day.  predniSONE (DELTASONE) 1 mg tablet Take 3 mg by mouth daily.  DULoxetine (CYMBALTA) 60 mg capsule Take 60 mg by mouth daily.  acetaminophen (TYLENOL EXTRA STRENGTH) 500 mg tablet Take  by mouth every six (6) hours as needed for Pain.  metoprolol succinate (TOPROL-XL) 25 mg XL tablet Take 50 mg by mouth daily. Take 1 tab po q am, take 1 tab po q pm  0    rosuvastatin (CRESTOR) 20 mg tablet Take 20 mg by mouth nightly.       cycloSPORINE (RESTASIS) 0.05 % ophthalmic emulsion Administer 1 Drop to both eyes two (2) times a day.  benazepril (LOTENSIN) 40 mg tablet Take 40 mg by mouth daily.  ALPRAZolam (XANAX) 0.5 mg tablet Take 0.5 mg by mouth. Allergies   Allergen Reactions    Adhesive Tape-Silicones Other (comments)    Levaquin [Levofloxacin] Nausea and Vomiting         REVIEW OF SYSTEMS    Constitutional: Negative for fever, chills, or weight change. Respiratory: Negative for cough or shortness of breath. Cardiovascular: Negative for chest pain or palpitations. Gastrointestinal: Negative for acid reflux, change in bowel habits, or constipation. Genitourinary: Negative for dysuria and flank pain. Musculoskeletal: Positive for cervical and sacroiliac pain. Neurological: Negative for headaches, dizziness, or numbness. Endo/Heme/Allergies: Negative for increased bruising. Psychiatric/Behavioral: Positive for difficulty with sleep. As per HPI    PHYSICAL EXAMINATION  Visit Vitals  /71 (BP 1 Location: Left upper arm)   Pulse 85   Temp 97.2 °F (36.2 °C)   Resp 18   Ht 5' 1.5\" (1.562 m)   Wt 200 lb (90.7 kg)   SpO2 97%   BMI 37.18 kg/m²       Constitutional: Awake, alert, and in no acute distress. Neurological: 1+ symmetrical DTRs in the upper extremities. 1+ symmetrical DTRs in the lower extremities. Sensation to light touch is intact. Negative Hart's sign bilaterally. Skin: warm, dry, and intact. Musculoskeletal: Very tight across the upper trapezius bilaterally, R>L. Decreased range of motion with side to side cervical flexion. Pain with abduction to 90 degrees of the left shoulder. Tenderness to palpation in the lower lumbar region. Moderate pain with extension and axial loading. Improvement with forward flexion. No pain with internal or external rotation of her hips. Negative straight leg raise bilaterally.       Biceps  Triceps Deltoids Wrist Ext Wrist Flex Hand Intrin   Right +4/5 +4/5 +4/5 +4/5 +4/5 +4/5   Left +4/5 +4/5 +4/5 +4/5 +4/5 +4/5      Hip Flex  Quads Hamstrings Ankle DF EHL Ankle PF   Right +4/5 +4/5 +4/5 +4/5 +4/5 +4/5   Left +4/5 +4/5 +4/5 +4/5 +4/5 +4/5     IMAGING:    Lumbar CT from 9/14/2020 was personally reviewed with the patient and demonstrated:  FINDINGS:     The imaged lung bases are clear. No retroperitoneal lymphadenopathy. Imaged portions of the kidneys, spleen, adrenal glands, and liver are normal.    Spinal cord terminates at the L1 level. Posterior fusion spanning L2-L5 with bilateral pedicle screws, interlocking vertical bars and multilevel laminotomies. No perihardware lucency to suggest loosening or infection. The left L5 pedicle screw is with its tip just outside the anterior cortex of L5. No fluid collection within the surgical bed. Vertebral body heights are preserved. At T10-T11, there is a mild broad-based disc osteophyte complex without high-grade central canal or foraminal narrowing. At T11-T12, there is a mild broad-based disc osteophyte complex without high-grade central canal or foraminal narrowing. At T12-L1, there is a mild broad-based disc osteophyte complex without high-grade central canal or foraminal narrowing. L1-L2: There is disc desiccation with vacuum disc phenomenon and moderate intervertebral disc space narrowing. A mild broad-based disc bulge with mild bilateral foraminal narrowing. Mild bilateral facet arthropathy. Mild central canal narrowing at this level. L2-L3: There is broad-based disc osteophyte complex superimposed on mild/moderate facet arthropathy. Mild central canal narrowing. Mild bilateral foraminal narrowing. .      L3-L4: No high-grade central canal or foraminal narrowing.      L4-L5: There is grade 1 anterolisthesis of L4 with respect L5 with slight distortion of the thecal sac , without high-grade central canal narrowing. Mild/moderate right foraminal narrowing. Mild left foraminal narrowing. .      L5-S1: There is disc desiccation, vacuum disc phenomenon and moderate intervertebral disc space narrowing. Moderate/severe left facet arthropathy with synovial air. Mild/moderate right facet arthropathy. Asymmetric left moderate foraminal narrowing. Mild/moderate right foraminal narrowing. Small focus of air related to right facet arthropathy is noted within the right subarticular recess. Mild central canal narrowing at this level. IMPRESSION    Posterior fusion spanning L2-L5 without evidence of hardware complication. Disproportionate discogenic degenerative changes at L1-L2 and L5-S1. Foraminal narrowing is most advanced at L5-S1. Facet arthropathy is most advanced on the left at L5-S1. No high-grade central canal narrowing throughout the lumbar spinal column. Please refer to the body of the report for a comprehensive segmental analysis of the lumbar spinal column.     Cervical spine 2V x-rays from 7/27/2020 were personally reviewed with the patient and demonstrated:  Diffuse degenerative changes with listhesis at C2-3.      Lumbar spine MRI from 02/24/2020 was personally reviewed with the patient and demonstrated:          Results from Hospital Encounter on 02/24/20   MRI LUMB SPINE WO CONT     Narrative MR lumbar spine without contrast     HISTORY: Chronic lumbar pain, acute bilateral low back pain with right-sided  sciatica, right leg weakness, previous lumbar fusion.     COMPARISON: MRI 9/25/2015     TECHNIQUE: Lumbar spine scanned with axial and sagittal T1W scans, sagittal  STIR, axial and sagittal T2W scans.     FINDINGS: L2-L5 posterolateral fusion. Stable grade 1 anterolisthesis of L4 on  L5. Mild dextroscoliosis. Normal vertebral body heights. Heterogeneous marrow  signal on the basis of endplate degenerative changes. L1 vertebral hemangioma. The conus medullaris is normal in signal and caliber ending at the T12-L1 disc  level. No clumping of the cauda equina nerve roots. Postoperative changes in the  paraspinal soft tissues.  No fluid collection or mass.     Spinal canal and neural foramen: Limited visualization due to hardware related  artifact. Bulging disc and osteophyte complexes at T9-10 and T10-11 without  significant spinal canal stenosis.      T12-L1: Tiny left central disc protrusion, new. No spinal canal or foraminal  stenosis.     L1-2: Significant worsening of degenerative disc and facet disease. Disc is  narrowed with bulging disc and osteophyte complex. Superimposed left central  disc protrusion. Moderate facet and ligamentum flavum hypertrophy with trace  bilateral facet effusions. Mild to moderate spinal canal and right greater than  left foraminal stenoses. No exiting nerve root compression.     L2-3: Similar disc space narrowing with disc osteophyte complex and facet  arthropathy with ligamentum flavum thickening. Mild spinal canal and foraminal  stenoses, stable.     L3-4: Similar disc space narrowing. Effective decompression of the spinal canal  with no residual stenosis. Widely patent left neural foramen but there is  similar degree of moderate right foraminal stenosis.     L4-5: Similar disc space narrowing and disc bulge. Spinal canal is effectively  decompressed with no significant residual stenosis. Mild foraminal stenoses  without nerve root compression, stable.      L5-S1: Disc bulge and facet hypertrophy. No spinal canal stenosis. Stable mild  to moderate left foraminal stenosis.       Impression IMPRESSION:     1.  Interval worsening of degenerative spinal disease most prominent at L1-2  where there is disc osteophyte complex and superimposed left central disc  protrusion producing mild to moderate spinal canal stenosis and right greater  than left foraminal stenoses. 2.  New tiny left central disc protrusion at T12-L1 without spinal stenosis. 3.  Effective decompression of the L3-4 and L4-5 spinal canal with mild residual  spinal stenosis at L2-3.        Written by Bharathi Sanders, as dictated by Flavio Gardiner MD.  I, Dr. Heather Richardson Siomara Clemens confirm that all documentation is accurate.

## 2021-04-15 ENCOUNTER — OFFICE VISIT (OUTPATIENT)
Dept: ORTHOPEDIC SURGERY | Age: 76
End: 2021-04-15
Payer: MEDICARE

## 2021-04-15 VITALS
SYSTOLIC BLOOD PRESSURE: 127 MMHG | TEMPERATURE: 97.2 F | WEIGHT: 200 LBS | OXYGEN SATURATION: 97 % | HEIGHT: 62 IN | HEART RATE: 85 BPM | RESPIRATION RATE: 18 BRPM | DIASTOLIC BLOOD PRESSURE: 71 MMHG | BODY MASS INDEX: 36.8 KG/M2

## 2021-04-15 DIAGNOSIS — M48.062 SPINAL STENOSIS OF LUMBAR REGION WITH NEUROGENIC CLAUDICATION: ICD-10-CM

## 2021-04-15 DIAGNOSIS — Z79.01 CHRONIC ANTICOAGULATION: ICD-10-CM

## 2021-04-15 DIAGNOSIS — M19.012 PRIMARY OSTEOARTHRITIS OF LEFT SHOULDER: ICD-10-CM

## 2021-04-15 DIAGNOSIS — G89.29 CHRONIC RIGHT SACROILIAC JOINT PAIN: Primary | ICD-10-CM

## 2021-04-15 DIAGNOSIS — M62.838 MUSCLE SPASM: ICD-10-CM

## 2021-04-15 DIAGNOSIS — M47.812 CERVICAL FACET JOINT SYNDROME: ICD-10-CM

## 2021-04-15 DIAGNOSIS — M53.3 CHRONIC RIGHT SACROILIAC JOINT PAIN: Primary | ICD-10-CM

## 2021-04-15 DIAGNOSIS — M35.3 PMR (POLYMYALGIA RHEUMATICA) (HCC): ICD-10-CM

## 2021-04-15 PROCEDURE — 99213 OFFICE O/P EST LOW 20 MIN: CPT | Performed by: PHYSICAL MEDICINE & REHABILITATION

## 2021-04-15 PROCEDURE — G8427 DOCREV CUR MEDS BY ELIG CLIN: HCPCS | Performed by: PHYSICAL MEDICINE & REHABILITATION

## 2021-04-15 PROCEDURE — G8752 SYS BP LESS 140: HCPCS | Performed by: PHYSICAL MEDICINE & REHABILITATION

## 2021-04-15 PROCEDURE — G8510 SCR DEP NEG, NO PLAN REQD: HCPCS | Performed by: PHYSICAL MEDICINE & REHABILITATION

## 2021-04-15 PROCEDURE — 1090F PRES/ABSN URINE INCON ASSESS: CPT | Performed by: PHYSICAL MEDICINE & REHABILITATION

## 2021-04-15 PROCEDURE — G8536 NO DOC ELDER MAL SCRN: HCPCS | Performed by: PHYSICAL MEDICINE & REHABILITATION

## 2021-04-15 PROCEDURE — G8754 DIAS BP LESS 90: HCPCS | Performed by: PHYSICAL MEDICINE & REHABILITATION

## 2021-04-15 PROCEDURE — G8400 PT W/DXA NO RESULTS DOC: HCPCS | Performed by: PHYSICAL MEDICINE & REHABILITATION

## 2021-04-15 PROCEDURE — G8417 CALC BMI ABV UP PARAM F/U: HCPCS | Performed by: PHYSICAL MEDICINE & REHABILITATION

## 2021-04-15 PROCEDURE — 3017F COLORECTAL CA SCREEN DOC REV: CPT | Performed by: PHYSICAL MEDICINE & REHABILITATION

## 2021-04-15 PROCEDURE — 1101F PT FALLS ASSESS-DOCD LE1/YR: CPT | Performed by: PHYSICAL MEDICINE & REHABILITATION

## 2021-04-15 NOTE — LETTER
4/16/2021 Patient: Ines Osorio YOB: 1945 Date of Visit: 4/15/2021 Giovanny Atkinson MD 
[address] [phone] [facility] [phone] Via Fax: [phone] Dear Giovanny Atkinson MD, Thank you for referring Ms. Bren Gonzales to [facility] for evaluation. My notes for this consultation are attached. If you have questions, please do not hesitate to call me. I look forward to following your patient along with you. Sincerely, Claudette Linea, MD

## 2021-04-15 NOTE — PATIENT INSTRUCTIONS
Low Back Arthritis: Exercises Introduction Here are some examples of typical rehabilitation exercises for your condition. Start each exercise slowly. Ease off the exercise if you start to have pain. Your doctor or physical therapist will tell you when you can start these exercises and which ones will work best for you. When you are not being active, find a comfortable position for rest. Some people are comfortable on the floor or a medium-firm bed with a small pillow under their head and another under their knees. Some people prefer to lie on their side with a pillow between their knees. Don't stay in one position for too long. Take short walks (10 to 20 minutes) every 2 to 3 hours. Avoid slopes, hills, and stairs until you feel better. Walk only distances you can manage without pain, especially leg pain. How to do the exercises Pelvic tilt 1. Lie on your back with your knees bent. 2. \"Brace\" your stomachtighten your muscles by pulling in and imagining your belly button moving toward your spine. 3. Press your lower back into the floor. You should feel your hips and pelvis rock back. 4. Hold for 6 seconds while breathing smoothly. 5. Relax and allow your pelvis and hips to rock forward. 6. Repeat 8 to 12 times. Back stretches 1. Get down on your hands and knees on the floor. 2. Relax your head and allow it to droop. Round your back up toward the ceiling until you feel a nice stretch in your upper, middle, and lower back. Hold this stretch for as long as it feels comfortable, or about 15 to 30 seconds. 3. Return to the starting position with a flat back while you are on your hands and knees. 4. Let your back sway by pressing your stomach toward the floor. Lift your buttocks toward the ceiling. 5. Hold this position for 15 to 30 seconds. 6. Repeat 2 to 4 times. Follow-up care is a key part of your treatment and safety.  Be sure to make and go to all appointments, and call your doctor if you are having problems. It's also a good idea to know your test results and keep a list of the medicines you take. Where can you learn more? Go to http://www.gray.com/ Enter S613 in the search box to learn more about \"Low Back Arthritis: Exercises. \" Current as of: November 16, 2020               Content Version: 12.8 © 2006-2021 [employer]. Care instructions adapted under license by [employer] (which disclaims liability or warranty for this information). If you have questions about a medical condition or this instruction, always ask your healthcare professional. [place] [address] any warranty or liability for your use of this information. Sacroiliac Pain: Exercises Introduction Here are some examples of exercises for you to try. The exercises may be suggested for a condition or for rehabilitation. Start each exercise slowly. Ease off the exercises if you start to have pain. You will be told when to start these exercises and which ones will work best for you. How to do the exercises Knee-to-chest stretch 7. Do not do the knee-to-chest exercise if it causes or increases back or leg pain. 8. Lie on your back with your knees bent and your feet flat on the floor. You can put a small pillow under your head and neck if it is more comfortable. 9. Grasp your hands under one knee and bring the knee to your chest, keeping the other foot flat on the floor. 10. Keep your lower back pressed to the floor. Hold for at least 15 to 30 seconds. 11. Relax and lower the knee to the starting position. Repeat with the other leg. 12. Repeat 2 to 4 times with each leg. 13. To get more stretch, keep your other leg flat on the floor while pulling your knee to your chest.   
Bridging 7. Lie on your back with both knees bent. Your knees should be bent about 90 degrees.  
8. Tighten your belly muscles by pulling in your belly button toward your spine. Then push your feet into the floor, squeeze your buttocks, and lift your hips off the floor until your shoulders, hips, and knees are all in a straight line. 9. Hold for about 6 seconds as you continue to breathe normally, and then slowly lower your hips back down to the floor and rest for up to 10 seconds. 10. Repeat 8 to 12 times. Hip extension 1. Get down on your hands and knees on the floor. 2. Keeping your back and neck straight, lift one leg straight out behind you. When you lift your leg, keep your hips level. Don't let your back twist, and don't let your hip drop toward the floor. 3. Hold for 6 seconds. Repeat 8 to 12 times with each leg. 4. If you feel steady and strong when you do this exercise, you can make it more difficult. To do this, when you lift your leg, also lift the opposite arm straight out in front of you. For example, lift the left leg and the right arm at the same time. (This is sometimes called the \"bird dog exercise. \") Hold for 6 seconds, and repeat 8 to 12 times on each side. Clamshell 1. Lie on your side with a pillow under your head. Keep your feet and knees together and your knees bent. 2. Raise your top knee, but keep your feet together. Do not let your hips roll back. Your legs should open up like a clamshell. 3. Hold for 6 seconds. 4. Slowly lower your knee back down. Rest for 10 seconds. 5. Repeat 8 to 12 times. 6. Switch to your other side and repeat steps 1 through 5. Hamstring wall stretch 1. Lie on your back in a doorway, with one leg through the open door. 2. Slide your affected leg up the wall to straighten your knee. You should feel a gentle stretch down the back of your leg. 3. Hold the stretch for at least 1 minute to begin. Then try to lengthen the time you hold the stretch to as long as 6 minutes. 4. Switch legs, and repeat steps 1 through 3. 
5. Repeat 2 to 4 times.  
6. If you do not have a place to do this exercise in a doorway, there is another way to do it: 
7. Lie on your back, and bend one knee. 8. Loop a towel under the ball and toes of that foot, and hold the ends of the towel in your hands. 9. Straighten your knee, and slowly pull back on the towel. You should feel a gentle stretch down the back of your leg. 10. Switch legs, and repeat steps 1 through 3. 
11. Repeat 2 to 4 times. 1. Do not arch your back. 2. Do not bend either knee. 3. Keep one heel touching the floor and the other heel touching the wall. Do not point your toes. Lower abdominal strengthening 1. Lie on your back with your knees bent and your feet flat on the floor. 2. Tighten your belly muscles by pulling your belly button in toward your spine. 3. Lift one foot off the floor and bring your knee toward your chest, so that your knee is straight above your hip and your leg is bent like the letter \"L. \" 
4. Lift the other knee up to the same position. 5. Lower one leg at a time to the starting position. 6. Keep alternating legs until you have lifted each leg 8 to 12 times. 7. Be sure to keep your belly muscles tight and your back still as you are moving your legs. Be sure to breathe normally. Piriformis stretch 1. Lie on your back with your legs straight. 2. Lift your affected leg, and bend your knee. With your opposite hand, reach across your body, and then gently pull your knee toward your opposite shoulder. 3. Hold the stretch for 15 to 30 seconds. 4. Switch legs and repeat steps 1 through 3. 
5. Repeat 2 to 4 times. Follow-up care is a key part of your treatment and safety. Be sure to make and go to all appointments, and call your doctor if you are having problems. It's also a good idea to know your test results and keep a list of the medicines you take. Where can you learn more? Go to http://www.gray.com/ Enter G627 in the search box to learn more about \"Sacroiliac Pain: Exercises. \" Current as of: November 16, 2020               Content Version: 12.8 © 6724-1363 [employer]. Care instructions adapted under license by [employer] (which disclaims liability or warranty for this information). If you have questions about a medical condition or this instruction, always ask your healthcare professional. [address] any warranty or liability for your use of this information.

## 2021-09-21 NOTE — PROGRESS NOTES
[facility]  [address]   Phone: [phone]  Fax: [phone]    Pt's YOB: 1945    ASSESSMENT   Diagnoses and all orders for this visit:    1. Lumbar facet arthropathy  -     SCHEDULE SURGERY    2. Muscle spasm  -     SCHEDULE SURGERY    3. Chronic right sacroiliac joint pain    4. Chronic anticoagulation    5. PMR (polymyalgia rheumatica) (Formerly [facility])         IMPRESSION AND PLAN:  Augustin Chand is a 70 y.o. female with history of lumbar pain. She reports intermittent sharp pain across the lower back that extends into the buttocks with certain maneuvers. Pt uses Lidoderm 5% patches intermittently as needed with relief and is on Eliquis. 1) Pt was given information on lumbar arthritis exercises. 2) She is not a candidate for NSAID's due to chronic anticoagulation with Eliquis. 3) Pt was scheduled for a bilateral L5-S1 facet injection. 4) She will continue using Lidoderm patches and does not need a refill at this time. 5) Ms. Samm Gr has a reminder for a \"due or due soon\" health maintenance. I have asked that she contact her primary care provider, Jorge Alberto Sharma MD, for follow-up on this health maintenance. 6)  demonstrated consistency with prescribing. HISTORY OF PRESENT ILLNESS:  Augustin Chand is a 70 y.o. female with history of lumbar pain and presents to the office today for follow up. She reports intermittent sharp pain across the lower back that extends into the buttocks with certain maneuvers. Pt reports improvement in her leg pain and weakness since she had a right sacroiliac joint fusion on 8/18/2021 by Dr. Alannah Gonzalez. Of note, she had a left sacroiliac joint fusion on 2/24/2021. She uses Lidoderm 5% patches intermittently as needed with relief. Pt is currently on Eliquis 5 mg and prednisone 3 mg. She notes that her last HbA1c was 5.7.  Pt at this time desires to proceed with a steroid injection. She is taking a trip to [place]. Pain Scale: 3/10    PCP: Mariel Silverman MD     Past Medical History:   Diagnosis Date    Arthritis     Hypercholesterolemia     Hypertension     Ill-defined condition     osteopenia    Migraine     PMR [facility]a) ([facility])     Thyroid disease         Social History     Socioeconomic History    Marital status:      Spouse name: Not on file    Number of children: Not on file    Years of education: Not on file    Highest education level: Not on file   Occupational History    Not on file   Tobacco Use    Smoking status: Former Smoker     Packs/day: 1.00     Years: 5.00     Pack years: 5.00     Quit date: 1970     Years since quittin.7    Smokeless tobacco: Never Used   Substance and Sexual Activity    Alcohol use: Yes     Alcohol/week: 0.8 standard drinks     Types: 1 Glasses of wine per week     Comment: 4 nights a week    Drug use: No    Sexual activity: Not on file     Comment: Hysterectomy   Other Topics Concern    Not on file   Social History Narrative    Not on file     Social Determinants of Health     Financial Resource Strain:     Difficulty of Paying Living Expenses:    Food Insecurity:     Worried About Running Out of Food in the Last Year:     Ran Out of Food in the Last Year:    Transportation Needs:     Lack of Transportation (Medical):      Lack of Transportation (Non-Medical):    Physical Activity:     Days of Exercise per Week:     Minutes of Exercise per Session:    Stress:     Feeling of Stress :    Social Connections:     Frequency of Communication with Friends and Family:     Frequency of Social Gatherings with Friends and Family:     Attends Congregational Services:     Active Member of Clubs or Organizations:     Attends Club or Organization Meetings:     Marital Status:    Intimate Partner Violence:     Fear of Current or Ex-Partner:     Emotionally Abused:     Physically Abused:     Sexually Abused:        Current Outpatient Medications   Medication Sig Dispense Refill    calcium phosphate-vitamin D3 (Citracal-D3 Gummies) 250 mg-12.5 mcg (500 unit) chew Citracal-D3 Gummies 250 mg-12.5 mcg (500 unit) chewable tablet   Take 2 tablets every day by oral route.  metFORMIN (GLUCOPHAGE) 500 mg tablet 1 tablet with a meal      metoprolol tartrate (LOPRESSOR) 25 mg tablet TAKE 1 TABLET TWICE A DAY      lidocaine (LIDODERM) 5 % APPLY PATCH TO THE AFFECTED AREA FOR 12 HOURS A DAY AND REMOVE FOR 12 HOURS A DAY 60 Each 5    thyroid, Pork, (ARMOUR THYROID) 60 mg tablet Take 60 mg by mouth daily. Plus 50 mg qdaily      apixaban (ELIQUIS) 5 mg tablet Take 5 mg by mouth two (2) times a day.  flecainide acetate (FLECAINIDE PO) Take  by mouth two (2) times a day.  predniSONE (DELTASONE) 1 mg tablet Take 3 mg by mouth daily.  DULoxetine (CYMBALTA) 60 mg capsule Take 60 mg by mouth daily.  metoprolol succinate (TOPROL-XL) 25 mg XL tablet Take 50 mg by mouth daily. Take 1 tab po q am, take 1 tab po q pm  0    rosuvastatin (CRESTOR) 20 mg tablet Take 20 mg by mouth nightly.  cycloSPORINE (RESTASIS) 0.05 % ophthalmic emulsion Administer 1 Drop to both eyes two (2) times a day.  benazepril (LOTENSIN) 40 mg tablet Take 40 mg by mouth daily. Allergies   Allergen Reactions    Adhesive Tape-Silicones Other (comments)    Levaquin [Levofloxacin] Nausea and Vomiting         REVIEW OF SYSTEMS    Constitutional: Negative for fever, chills, or weight change. Respiratory: Negative for cough or shortness of breath. Cardiovascular: Negative for chest pain or palpitations. Gastrointestinal: Negative for acid reflux, change in bowel habits, or constipation. Genitourinary: Negative for dysuria and flank pain. Musculoskeletal: Positive for lumbar pain. Neurological: Negative for headaches, dizziness, or numbness.   Endo/Heme/Allergies: Negative for increased bruising. Psychiatric/Behavioral: Negative for difficulty with sleep. As per HPI    PHYSICAL EXAMINATION  Visit Vitals  BP (!) 148/70 (BP 1 Location: Left upper arm)   Pulse 71   Temp 96.9 °F (36.1 °C)   Resp 17   Wt 194 lb (88 kg)   SpO2 98%   BMI 36.06 kg/m²       Constitutional: Awake, alert, and in no acute distress. Neurological: Sensation to light touch is intact. Skin: warm, dry, and intact. Musculoskeletal: Tenderness to palpation in the lower lumbar region and over the right sacroiliac joint. Moderate pain with extension and axial loading. Improvement with forward flexion. No pain with internal or external rotation of her hips. Negative straight leg raise bilaterally. Patient ambulates with the assistance of a single point cane. Biceps  Triceps Deltoids Wrist Ext Wrist Flex Hand Intrin   Right +4/5 +4/5 +4/5 +4/5 +4/5 +4/5   Left +4/5 +4/5 +4/5 +4/5 +4/5 +4/5      Hip Flex  Quads Hamstrings Ankle DF EHL Ankle PF   Right +4/5 +4/5 +4/5 +4/5 +4/5 +4/5   Left +4/5 +4/5 +4/5 +4/5 +4/5 +4/5     IMAGING:    Lumbar spine x-rays from 4/27/2021 were personally reviewed with the patient and demonstrated:  FINDINGS:     Mild S-shaped scoliosis. L2-5 posterior pedicle screw fixation with hardware intact and without adjacent lucency. Fixation left SI joint has occurred in the interim. Degenerative changes right SI joint. Similar grade 1 anterolisthesis L4-5 and 3 mm retrolisthesis L1-2. Severe disc space narrowing L2-S1 is unchanged. Increased severe disc space narrowing L1-2 since prior imaging. Anterior endplate osteophytes. L5-S1 lumbar facet hypertrophy. IMPRESSION     Similar L2-5 fixation with interval left SI joint fixation. Similar grade 1 anterolisthesis L4-5 and minimal retrolisthesis L1-2. Progressed severe degenerative disc disease L1-2. Lumbar CT from 9/14/2020 was personally reviewed with the patient and demonstrated:  FINDINGS:     The imaged lung bases are clear.  No retroperitoneal lymphadenopathy. Imaged portions of the kidneys, spleen, adrenal glands, and liver are normal.    Spinal cord terminates at the L1 level. Posterior fusion spanning L2-L5 with bilateral pedicle screws, interlocking vertical bars and multilevel laminotomies. No perihardware lucency to suggest loosening or infection. The left L5 pedicle screw is with its tip just outside the anterior cortex of L5. No fluid collection within the surgical bed. Vertebral body heights are preserved. At T10-T11, there is a mild broad-based disc osteophyte complex without high-grade central canal or foraminal narrowing. At T11-T12, there is a mild broad-based disc osteophyte complex without high-grade central canal or foraminal narrowing. At T12-L1, there is a mild broad-based disc osteophyte complex without high-grade central canal or foraminal narrowing. L1-L2: There is disc desiccation with vacuum disc phenomenon and moderate intervertebral disc space narrowing. A mild broad-based disc bulge with mild bilateral foraminal narrowing. Mild bilateral facet arthropathy. Mild central canal narrowing at this level. L2-L3: There is broad-based disc osteophyte complex superimposed on mild/moderate facet arthropathy. Mild central canal narrowing. Mild bilateral foraminal narrowing. .      L3-L4: No high-grade central canal or foraminal narrowing.      L4-L5: There is grade 1 anterolisthesis of L4 with respect L5 with slight distortion of the thecal sac , without high-grade central canal narrowing. Mild/moderate right foraminal narrowing. Mild left foraminal narrowing. .      L5-S1: There is disc desiccation, vacuum disc phenomenon and moderate intervertebral disc space narrowing. Moderate/severe left facet arthropathy with synovial air. Mild/moderate right facet arthropathy. Asymmetric left moderate foraminal narrowing. Mild/moderate right foraminal narrowing.  Small focus of air related to right facet arthropathy is noted within the right subarticular recess. Mild central canal narrowing at this level. IMPRESSION    Posterior fusion spanning L2-L5 without evidence of hardware complication. Disproportionate discogenic degenerative changes at L1-L2 and L5-S1. Foraminal narrowing is most advanced at L5-S1. Facet arthropathy is most advanced on the left at L5-S1. No high-grade central canal narrowing throughout the lumbar spinal column. Please refer to the body of the report for a comprehensive segmental analysis of the lumbar spinal column.     Cervical spine 2V x-rays from 7/27/2020 were personally reviewed with the patient and demonstrated:  Diffuse degenerative changes with listhesis at C2-3.      Lumbar spine MRI from 02/24/2020 was personally reviewed with the patient and demonstrated:          Results from Hospital Encounter on 02/24/20   MRI LUMB SPINE WO CONT     Narrative MR lumbar spine without contrast     HISTORY: Chronic lumbar pain, acute bilateral low back pain with right-sided  sciatica, right leg weakness, previous lumbar fusion.     COMPARISON: MRI 9/25/2015     TECHNIQUE: Lumbar spine scanned with axial and sagittal T1W scans, sagittal  STIR, axial and sagittal T2W scans.     FINDINGS: L2-L5 posterolateral fusion. Stable grade 1 anterolisthesis of L4 on  L5. Mild dextroscoliosis. Normal vertebral body heights. Heterogeneous marrow  signal on the basis of endplate degenerative changes. L1 vertebral hemangioma. The conus medullaris is normal in signal and caliber ending at the T12-L1 disc  level. No clumping of the cauda equina nerve roots. Postoperative changes in the  paraspinal soft tissues. No fluid collection or mass.     Spinal canal and neural foramen: Limited visualization due to hardware related  artifact. Bulging disc and osteophyte complexes at T9-10 and T10-11 without  significant spinal canal stenosis.      T12-L1: Tiny left central disc protrusion, new.  No spinal canal or foraminal  stenosis.     L1-2: Significant worsening of degenerative disc and facet disease. Disc is  narrowed with bulging disc and osteophyte complex. Superimposed left central  disc protrusion. Moderate facet and ligamentum flavum hypertrophy with trace  bilateral facet effusions. Mild to moderate spinal canal and right greater than  left foraminal stenoses. No exiting nerve root compression.     L2-3: Similar disc space narrowing with disc osteophyte complex and facet  arthropathy with ligamentum flavum thickening. Mild spinal canal and foraminal  stenoses, stable.     L3-4: Similar disc space narrowing. Effective decompression of the spinal canal  with no residual stenosis. Widely patent left neural foramen but there is  similar degree of moderate right foraminal stenosis.     L4-5: Similar disc space narrowing and disc bulge. Spinal canal is effectively  decompressed with no significant residual stenosis. Mild foraminal stenoses  without nerve root compression, stable.      L5-S1: Disc bulge and facet hypertrophy. No spinal canal stenosis. Stable mild  to moderate left foraminal stenosis.       Impression IMPRESSION:     1.  Interval worsening of degenerative spinal disease most prominent at L1-2  where there is disc osteophyte complex and superimposed left central disc  protrusion producing mild to moderate spinal canal stenosis and right greater  than left foraminal stenoses. 2.  New tiny left central disc protrusion at T12-L1 without spinal stenosis. 3.  Effective decompression of the L3-4 and L4-5 spinal canal with mild residual  spinal stenosis at L2-3.          Written by Otto Preciado, as dictated by Winifred Car MD.  I, Dr. Winifred Car confirm that all documentation is accurate.

## 2021-09-23 ENCOUNTER — OFFICE VISIT (OUTPATIENT)
Dept: ORTHOPEDIC SURGERY | Age: 76
End: 2021-09-23
Payer: MEDICARE

## 2021-09-23 VITALS
OXYGEN SATURATION: 98 % | TEMPERATURE: 96.9 F | DIASTOLIC BLOOD PRESSURE: 70 MMHG | RESPIRATION RATE: 17 BRPM | HEART RATE: 71 BPM | WEIGHT: 194 LBS | SYSTOLIC BLOOD PRESSURE: 148 MMHG | BODY MASS INDEX: 36.06 KG/M2

## 2021-09-23 DIAGNOSIS — Z79.01 CHRONIC ANTICOAGULATION: ICD-10-CM

## 2021-09-23 DIAGNOSIS — M62.838 MUSCLE SPASM: ICD-10-CM

## 2021-09-23 DIAGNOSIS — G89.29 CHRONIC RIGHT SACROILIAC JOINT PAIN: ICD-10-CM

## 2021-09-23 DIAGNOSIS — M53.3 CHRONIC RIGHT SACROILIAC JOINT PAIN: ICD-10-CM

## 2021-09-23 DIAGNOSIS — M47.816 LUMBAR FACET ARTHROPATHY: Primary | ICD-10-CM

## 2021-09-23 DIAGNOSIS — M35.3 PMR (POLYMYALGIA RHEUMATICA) (HCC): ICD-10-CM

## 2021-09-23 PROCEDURE — 99213 OFFICE O/P EST LOW 20 MIN: CPT | Performed by: PHYSICAL MEDICINE & REHABILITATION

## 2021-09-23 PROCEDURE — 1101F PT FALLS ASSESS-DOCD LE1/YR: CPT | Performed by: PHYSICAL MEDICINE & REHABILITATION

## 2021-09-23 PROCEDURE — G8400 PT W/DXA NO RESULTS DOC: HCPCS | Performed by: PHYSICAL MEDICINE & REHABILITATION

## 2021-09-23 PROCEDURE — G8427 DOCREV CUR MEDS BY ELIG CLIN: HCPCS | Performed by: PHYSICAL MEDICINE & REHABILITATION

## 2021-09-23 PROCEDURE — G8417 CALC BMI ABV UP PARAM F/U: HCPCS | Performed by: PHYSICAL MEDICINE & REHABILITATION

## 2021-09-23 PROCEDURE — G8432 DEP SCR NOT DOC, RNG: HCPCS | Performed by: PHYSICAL MEDICINE & REHABILITATION

## 2021-09-23 PROCEDURE — 3017F COLORECTAL CA SCREEN DOC REV: CPT | Performed by: PHYSICAL MEDICINE & REHABILITATION

## 2021-09-23 PROCEDURE — G8536 NO DOC ELDER MAL SCRN: HCPCS | Performed by: PHYSICAL MEDICINE & REHABILITATION

## 2021-09-23 PROCEDURE — 1090F PRES/ABSN URINE INCON ASSESS: CPT | Performed by: PHYSICAL MEDICINE & REHABILITATION

## 2021-09-23 PROCEDURE — G8754 DIAS BP LESS 90: HCPCS | Performed by: PHYSICAL MEDICINE & REHABILITATION

## 2021-09-23 PROCEDURE — G8753 SYS BP > OR = 140: HCPCS | Performed by: PHYSICAL MEDICINE & REHABILITATION

## 2021-09-23 RX ORDER — NEOMYCIN/POLYMYXIN B/PRAMOXINE 3.5-10K-1
CREAM (GRAM) TOPICAL
COMMUNITY

## 2021-09-23 RX ORDER — METFORMIN HYDROCHLORIDE 500 MG/1
TABLET ORAL
COMMUNITY
End: 2022-01-30

## 2021-09-23 RX ORDER — METOPROLOL TARTRATE 25 MG/1
TABLET, FILM COATED ORAL
COMMUNITY
Start: 2021-03-02

## 2021-09-23 NOTE — PATIENT INSTRUCTIONS
Low Back Arthritis: Exercises  Introduction  Here are some examples of typical rehabilitation exercises for your condition. Start each exercise slowly. Ease off the exercise if you start to have pain. Your doctor or physical therapist will tell you when you can start these exercises and which ones will work best for you. When you are not being active, find a comfortable position for rest. Some people are comfortable on the floor or a medium-firm bed with a small pillow under their head and another under their knees. Some people prefer to lie on their side with a pillow between their knees. Don't stay in one position for too long. Take short walks (10 to 20 minutes) every 2 to 3 hours. Avoid slopes, hills, and stairs until you feel better. Walk only distances you can manage without pain, especially leg pain. How to do the exercises  Pelvic tilt    1. Lie on your back with your knees bent. 2. \"Brace\" your stomachtighten your muscles by pulling in and imagining your belly button moving toward your spine. 3. Press your lower back into the floor. You should feel your hips and pelvis rock back. 4. Hold for 6 seconds while breathing smoothly. 5. Relax and allow your pelvis and hips to rock forward. 6. Repeat 8 to 12 times. Back stretches    1. Get down on your hands and knees on the floor. 2. Relax your head and allow it to droop. Round your back up toward the ceiling until you feel a nice stretch in your upper, middle, and lower back. Hold this stretch for as long as it feels comfortable, or about 15 to 30 seconds. 3. Return to the starting position with a flat back while you are on your hands and knees. 4. Let your back sway by pressing your stomach toward the floor. Lift your buttocks toward the ceiling. 5. Hold this position for 15 to 30 seconds. 6. Repeat 2 to 4 times. Follow-up care is a key part of your treatment and safety.  Be sure to make and go to all appointments, and call your doctor if you are having problems. It's also a good idea to know your test results and keep a list of the medicines you take. Where can you learn more? Go to http://www.[employer].com/  Enter T094 in the search box to learn more about \"Low Back Arthritis: Exercises. \"  Current as of: July 1, 2021               Content Version: 13.0  © 5638-3906 "[employer]". Care instructions adapted under license by [employer] (which disclaims liability or warranty for this information). If you have questions about a medical condition or this instruction, always ask your healthcare professional. [place] [address] any warranty or liability for your use of this information.

## 2021-09-23 NOTE — LETTER
9/24/2021    Patient: Yokasta Johns   YOB: 1945   Date of Visit: 9/23/2021     Lesvia Joseph MD  [address] [place] [phone]  Via Fax: [phone]    Dear Lesvia Joseph MD,      Thank you for referring Ms. Zhang Reddy to [address] for evaluation. My notes for this consultation are attached. If you have questions, please do not hesitate to call me. I look forward to following your patient along with you.       Sincerely,    Jorge Cartagena MD

## 2021-10-26 ENCOUNTER — TELEPHONE (OUTPATIENT)
Dept: ORTHOPEDIC SURGERY | Age: 76
End: 2021-10-26

## 2021-11-03 ENCOUNTER — HOSPITAL ENCOUNTER (OUTPATIENT)
Age: 76
Setting detail: OUTPATIENT SURGERY
Discharge: HOME | End: 2021-11-03
Attending: PHYSICAL MEDICINE & REHABILITATION | Admitting: PHYSICAL MEDICINE & REHABILITATION
Payer: MEDICARE

## 2021-11-03 ENCOUNTER — APPOINTMENT (OUTPATIENT)
Dept: GENERAL RADIOLOGY | Age: 76
End: 2021-11-03
Attending: PHYSICAL MEDICINE & REHABILITATION
Payer: MEDICARE

## 2021-11-03 VITALS — HEART RATE: 82 BPM | OXYGEN SATURATION: 95 % | RESPIRATION RATE: 16 BRPM | TEMPERATURE: 97.9 F

## 2021-11-03 DIAGNOSIS — M62.838 MUSCLE SPASM: ICD-10-CM

## 2021-11-03 DIAGNOSIS — M47.816 LUMBAR FACET ARTHROPATHY: ICD-10-CM

## 2021-11-03 PROCEDURE — 64493 INJ PARAVERT F JNT L/S 1 LEV: CPT | Performed by: PHYSICAL MEDICINE & REHABILITATION

## 2021-11-03 PROCEDURE — 2709999900 HC NON-CHARGEABLE SUPPLY: Performed by: PHYSICAL MEDICINE & REHABILITATION

## 2021-11-03 PROCEDURE — 74011000250 HC RX REV CODE- 250: Performed by: PHYSICAL MEDICINE & REHABILITATION

## 2021-11-03 PROCEDURE — 74011250636 HC RX REV CODE- 250/636: Performed by: PHYSICAL MEDICINE & REHABILITATION

## 2021-11-03 PROCEDURE — 76010000009 HC PAIN MGT 0 TO 30 MIN PROC: Performed by: PHYSICAL MEDICINE & REHABILITATION

## 2021-11-03 PROCEDURE — 77030039433 HC TY MYLEOGRAM BD -B: Performed by: PHYSICAL MEDICINE & REHABILITATION

## 2021-11-03 RX ORDER — DEXAMETHASONE SODIUM PHOSPHATE 100 MG/10ML
INJECTION INTRAMUSCULAR; INTRAVENOUS AS NEEDED
Status: DISCONTINUED | OUTPATIENT
Start: 2021-11-03 | End: 2021-11-03 | Stop reason: HOSPADM

## 2021-11-03 RX ORDER — LIDOCAINE HYDROCHLORIDE 10 MG/ML
INJECTION, SOLUTION EPIDURAL; INFILTRATION; INTRACAUDAL; PERINEURAL AS NEEDED
Status: DISCONTINUED | OUTPATIENT
Start: 2021-11-03 | End: 2021-11-03 | Stop reason: HOSPADM

## 2021-11-03 RX ORDER — DIAZEPAM 5 MG/1
5-20 TABLET ORAL ONCE
Status: DISCONTINUED | OUTPATIENT
Start: 2021-11-03 | End: 2021-11-03 | Stop reason: HOSPADM

## 2021-11-03 NOTE — H&P
Date of Surgery Update:  Kellie Yarbrough was seen and examined. History and physical has been reviewed. The patient has been examined. There have been no significant clinical changes since the last office visit. The patient was counseled at length about the risks of ruby Covid-19 during their perioperative period and any recovery window from their procedure. The patient was made aware that ruby Covid-19  may worsen their prognosis for recovering from their procedure and lend to a higher morbidity and/or mortality risk. All material risks, benefits, and reasonable alternatives including postponing the procedure were discussed. The patient does  wish to proceed with the procedure at this time.     Pre-injection pain level:  3/10  Post injection pain level:  0/10      Signed By: Trudy Feng MD     November 3, 2021 1:01 PM

## 2021-11-03 NOTE — PROCEDURES
Facet Joint Block Procedure Note    Patient Name: Ranjeet Ahuja    Date of Procedure: November 3, 2021    Preoperative Diagnosis: Spondylosis of lumbar region without myelopathy or radiculopathy [M47.816]    Post Operative Diagnosis:Spondylosis of lumbar region without myelopathy or radiculopathy [M47.816]     Procedure:  bilateral  L5-S1 Facet Joint Block    Consent: Informed consent was obtained prior to the procedure. The patient was given the opportunity to ask questions regarding the procedure and its associated risks. In addition to the potential risks associated with the procedure itself, the patient was informed both verbally and in writing of potential side effects of the use of glucocorticoids. The patient appeared to comprehend the informed consent and desired to have the procedure performed. Procedure: The patient was placed in the prone position on the flouroscopy table and the back was prepped and draped in the usual sterile manner. At each blocked level, the exact location of the facet joint was identified with flouroscopy, and after local Lidocaine 1% injection, a #22 gauge spinal needle was then advanced toward the joint. A total of 10 mg of preservative free Dexamethasone and 5 cc of Lidocaine was injected around and equally divided among all of the sites. The patient tolerated the procedure well. The injection area was cleaned and bandaids applied. No excessive bleeding was noted. Patient dressed and was discharged to home with instructions.        Renuka Dukes MD  November 3, 2021

## 2021-12-16 ENCOUNTER — OFFICE VISIT (OUTPATIENT)
Dept: ORTHOPEDIC SURGERY | Age: 76
End: 2021-12-16
Payer: MEDICARE

## 2021-12-16 VITALS
DIASTOLIC BLOOD PRESSURE: 60 MMHG | HEIGHT: 62 IN | OXYGEN SATURATION: 97 % | TEMPERATURE: 97.3 F | BODY MASS INDEX: 35.7 KG/M2 | HEART RATE: 87 BPM | SYSTOLIC BLOOD PRESSURE: 128 MMHG | WEIGHT: 194 LBS

## 2021-12-16 DIAGNOSIS — M62.838 MUSCLE SPASM: ICD-10-CM

## 2021-12-16 DIAGNOSIS — G89.29 OTHER CHRONIC PAIN: ICD-10-CM

## 2021-12-16 DIAGNOSIS — M53.3 SACROILIAC JOINT DYSFUNCTION: ICD-10-CM

## 2021-12-16 DIAGNOSIS — M48.062 SPINAL STENOSIS OF LUMBAR REGION WITH NEUROGENIC CLAUDICATION: ICD-10-CM

## 2021-12-16 DIAGNOSIS — Z79.01 CHRONIC ANTICOAGULATION: ICD-10-CM

## 2021-12-16 DIAGNOSIS — M47.816 LUMBAR FACET ARTHROPATHY: Primary | ICD-10-CM

## 2021-12-16 DIAGNOSIS — M35.3 PMR (POLYMYALGIA RHEUMATICA) (HCC): ICD-10-CM

## 2021-12-16 DIAGNOSIS — Z98.1 HISTORY OF LUMBAR FUSION: ICD-10-CM

## 2021-12-16 DIAGNOSIS — M47.816 LUMBAR FACET ARTHROPATHY: ICD-10-CM

## 2021-12-16 PROCEDURE — 3017F COLORECTAL CA SCREEN DOC REV: CPT | Performed by: PHYSICAL MEDICINE & REHABILITATION

## 2021-12-16 PROCEDURE — G8536 NO DOC ELDER MAL SCRN: HCPCS | Performed by: PHYSICAL MEDICINE & REHABILITATION

## 2021-12-16 PROCEDURE — 1090F PRES/ABSN URINE INCON ASSESS: CPT | Performed by: PHYSICAL MEDICINE & REHABILITATION

## 2021-12-16 PROCEDURE — 1101F PT FALLS ASSESS-DOCD LE1/YR: CPT | Performed by: PHYSICAL MEDICINE & REHABILITATION

## 2021-12-16 PROCEDURE — G8400 PT W/DXA NO RESULTS DOC: HCPCS | Performed by: PHYSICAL MEDICINE & REHABILITATION

## 2021-12-16 PROCEDURE — 72110 X-RAY EXAM L-2 SPINE 4/>VWS: CPT | Performed by: PHYSICAL MEDICINE & REHABILITATION

## 2021-12-16 PROCEDURE — 99214 OFFICE O/P EST MOD 30 MIN: CPT | Performed by: PHYSICAL MEDICINE & REHABILITATION

## 2021-12-16 PROCEDURE — G8427 DOCREV CUR MEDS BY ELIG CLIN: HCPCS | Performed by: PHYSICAL MEDICINE & REHABILITATION

## 2021-12-16 PROCEDURE — G8754 DIAS BP LESS 90: HCPCS | Performed by: PHYSICAL MEDICINE & REHABILITATION

## 2021-12-16 PROCEDURE — G8417 CALC BMI ABV UP PARAM F/U: HCPCS | Performed by: PHYSICAL MEDICINE & REHABILITATION

## 2021-12-16 PROCEDURE — G8752 SYS BP LESS 140: HCPCS | Performed by: PHYSICAL MEDICINE & REHABILITATION

## 2021-12-16 PROCEDURE — G8432 DEP SCR NOT DOC, RNG: HCPCS | Performed by: PHYSICAL MEDICINE & REHABILITATION

## 2021-12-16 RX ORDER — ACETAMINOPHEN AND CODEINE PHOSPHATE 300; 30 MG/1; MG/1
1 TABLET ORAL
Qty: 28 TABLET | Refills: 0 | Status: SHIPPED | OUTPATIENT
Start: 2021-12-16 | End: 2021-12-16 | Stop reason: SDUPTHER

## 2021-12-16 RX ORDER — ACETAMINOPHEN AND CODEINE PHOSPHATE 300; 30 MG/1; MG/1
1 TABLET ORAL
Qty: 28 TABLET | Refills: 0 | Status: SHIPPED | OUTPATIENT
Start: 2021-12-16 | End: 2021-12-23

## 2021-12-16 NOTE — PROGRESS NOTES
[facility]  [address] [facility], [facility]   Phone: [phone]  Fax: [phone]    Pt's YOB: 1945    ASSESSMENT   Diagnoses and all orders for this visit:    1. Lumbar facet arthropathy  -     MRI LUMB SPINE WO CONT; Future  -     AMB POC XRAY, SPINE, LUMBOSACRAL; 4+    2. Muscle spasm  -     MRI LUMB SPINE WO CONT; Future  -     AMB POC XRAY, SPINE, LUMBOSACRAL; 4+    3. Spinal stenosis of lumbar region with neurogenic claudication  -     MRI LUMB SPINE WO CONT; Future    4. Other chronic pain  -     MRI LUMB SPINE WO CONT; Future  -     acetaminophen-codeine (Tylenol-Codeine #3) 300-30 mg per tablet; Take 1 Tablet by mouth every six (6) hours as needed for Pain for up to 7 days. Max Daily Amount: 4 Tablets. Indications: pain    5. Sacroiliac joint dysfunction  -     acetaminophen-codeine (Tylenol-Codeine #3) 300-30 mg per tablet; Take 1 Tablet by mouth every six (6) hours as needed for Pain for up to 7 days. Max Daily Amount: 4 Tablets. Indications: pain    6. Chronic anticoagulation    7. PMR (polymyalgia rheumatica) (HCC)    8. History of lumbar fusion         IMPRESSION AND PLAN:  Naa Weiss is a 78 y.o. female with history of lumbar pain. Pt complains of lower lumbar pain, noting that she has to rest or slowly begins to bend forward after prolonged standing and ambulation, and a dull aching pain in her left leg. Pt intermittently takes Tylenol with no relief and is taking prednisone 3 mg and Eliquis. 1) Pt was given information on lumbar, shoulder, and foot arthritis exercises. 2) She was prescribed Tylenol #3 1 tab Q6 hours prn pain- pt has limited medication options due to use of Eliquis. 3) Lumbar x-rays were obtained at today's office visit. 4) A lumbar MRI was ordered to assess for worsening stenosis and inflammation  5) Pt is not a candidate for NSAID's due to chronic anticoagulation with Eliquis. 6) Ms. Adalgisa has a reminder for a \"due or due soon\" health maintenance. I have asked that she contact her primary care provider, David Modi MD, for follow-up on this health maintenance. 7)  demonstrated consistency with prescribing. 8) Pt remains on low dose prednisone for PMR  Follow-up and Dispositions    · Return in about 4 weeks (around 1/13/2022) for Medication follow up, Diagnostic Test follow up. HISTORY OF PRESENT ILLNESS:  Sherren Lesser is a 78 y.o. female with history of lumbar pain and presents to the office today for injection follow up. Pt complains of lower lumbar pain, noting that she has to rest or slowly begins to bend forward after prolonged standing and ambulation, and a dull aching pain in her left leg. She notes that her leg weakness has improved since a bilateral L5-S1 block on 11/03/2021. Pt intermittently takes Tylenol with no relief and is taking prednisone 3 mg and Eliquis due to a history of TIAs. She reports no relief with lidocaine patches. She notes temporary relief x a few days with the most recent bilateral L5-S1 injection but reports significant, lasting relief with a left L1 and L2 nerve block injection 2 years ago. Pt confirms that she previously underwent an L2-L5 fusion and is scheduled for a shoulder replacement on 02/02/2022 and She further admits to arthritis in her feet. Pt at this time desires to proceed with medication evaluation, lumbar x-rays, and a lumbar MRI. Of note, pt ambulates with the assistance of a single point cane.      Pain Scale: 3/10    PCP: David Modi MD     Past Medical History:   Diagnosis Date    Arthritis     Hypercholesterolemia     Hypertension     Ill-defined condition     osteopenia    Lower back pain     Migraine     PMR (polymyalgia rheumatica) (HCC)     Thyroid disease         Social History     Socioeconomic History    Marital status:      Spouse name: Not on file    Number of children: Not on file    Years of education: Not on file    Highest education level: Not on file   Occupational History    Not on file   Tobacco Use    Smoking status: Former Smoker     Packs/day: 1.00     Years: 5.00     Pack years: 5.00     Quit date: 1970     Years since quittin.9    Smokeless tobacco: Never Used   Substance and Sexual Activity    Alcohol use: Yes     Alcohol/week: 0.8 standard drinks     Types: 1 Glasses of wine per week     Comment: 4 nights a week    Drug use: No    Sexual activity: Not on file     Comment: Hysterectomy   Other Topics Concern    Not on file   Social History Narrative    Not on file     Social Determinants of Health     Financial Resource Strain:     Difficulty of Paying Living Expenses: Not on file   Food Insecurity:     Worried About Running Out of Food in the Last Year: Not on file    Poornima of Food in the Last Year: Not on file   Transportation Needs:     Lack of Transportation (Medical): Not on file    Lack of Transportation (Non-Medical):  Not on file   Physical Activity:     Days of Exercise per Week: Not on file    Minutes of Exercise per Session: Not on file   Stress:     Feeling of Stress : Not on file   Social Connections:     Frequency of Communication with Friends and Family: Not on file    Frequency of Social Gatherings with Friends and Family: Not on file    Attends [employer] Services: Not on file    Active Member of [address] [place] [address] or Organizations: Not on file    Attends Club or Organization Meetings: Not on file    Marital Status: Not on file   Intimate Partner Violence:     Fear of Current or Ex-Partner: Not on file    Emotionally Abused: Not on file    Physically Abused: Not on file    Sexually Abused: Not on file   Housing Stability:     Unable to Pay for Housing in the Last Year: Not on file    Number of [place] in the Last Year: Not on file    Unstable Housing in the Last Year: Not on file       Current Outpatient Medications Medication Sig Dispense Refill    acetaminophen-codeine (Tylenol-Codeine #3) 300-30 mg per tablet Take 1 Tablet by mouth every six (6) hours as needed for Pain for up to 7 days. Max Daily Amount: 4 Tablets. Indications: pain 28 Tablet 0    calcium phosphate-vitamin D3 (Citracal-D3 Gummies) 250 mg-12.5 mcg (500 unit) chew Citracal-D3 Gummies 250 mg-12.5 mcg (500 unit) chewable tablet   Take 2 tablets every day by oral route.  metoprolol tartrate (LOPRESSOR) 25 mg tablet TAKE 1 TABLET TWICE A DAY      lidocaine (LIDODERM) 5 % APPLY PATCH TO THE AFFECTED AREA FOR 12 HOURS A DAY AND REMOVE FOR 12 HOURS A DAY 60 Each 5    thyroid, Pork, (ARMOUR THYROID) 60 mg tablet Take 60 mg by mouth daily. Plus 50 mg qdaily      apixaban (ELIQUIS) 5 mg tablet Take 5 mg by mouth two (2) times a day.  flecainide acetate (FLECAINIDE PO) Take  by mouth two (2) times a day.  predniSONE (DELTASONE) 1 mg tablet Take 3 mg by mouth daily.  DULoxetine (CYMBALTA) 60 mg capsule Take 60 mg by mouth daily.  rosuvastatin (CRESTOR) 20 mg tablet Take 20 mg by mouth nightly.  cycloSPORINE (RESTASIS) 0.05 % ophthalmic emulsion Administer 1 Drop to both eyes two (2) times a day.  benazepril (LOTENSIN) 40 mg tablet Take 40 mg by mouth daily.  metFORMIN (GLUCOPHAGE) 500 mg tablet 1 tablet with a meal (Patient not taking: Reported on 12/16/2021)         Allergies   Allergen Reactions    Adhesive Tape-Silicones Other (comments)    Levaquin [Levofloxacin] Nausea and Vomiting         REVIEW OF SYSTEMS    Constitutional: Negative for fever, chills, or weight change. Respiratory: Negative for cough or shortness of breath. Cardiovascular: Negative for chest pain or palpitations. Gastrointestinal: Negative for acid reflux, change in bowel habits, or constipation. Genitourinary: Negative for dysuria and flank pain. Musculoskeletal: Positive for lumbar and left leg pain.  Patient ambulates with the assistance of a single point cane. Skin: Negative for rash. Neurological: Negative for headaches, dizziness, or numbness. Endo/Heme/Allergies: Negative for increased bruising. Psychiatric/Behavioral: Negative for difficulty with sleep. As per HPI    PHYSICAL EXAMINATION  Visit Vitals  /60   Pulse 87   Temp 97.3 °F (36.3 °C)   Ht 5' 1.5\" (1.562 m)   Wt 194 lb (88 kg)   SpO2 97%   BMI 36.06 kg/m²       Constitutional: Awake, alert, and in no acute distress. Neurological: Sensation to light touch is intact. Skin: warm, dry, and intact. Musculoskeletal: Moderate pain with extension, axial loading, and less with forward flexion. No pain with internal or external rotation of her hips. Negative straight leg raise bilaterally. Mild tenderness to palpation over SI joints bilaterally     Hip Flex  Quads Hamstrings Ankle DF EHL Ankle PF   Right +4/5 +4/5 +4/5 +4/5 +4/5 +4/5   Left +4/5 +4/5 +4/5 +4/5 +4/5 +4/5     IMAGING:    Lumbar spine x-rays from 10/28/2021 were personally reviewed with the patient and demonstrated:    FINDINGS:     Similar levoscoliosis. Bilateral sacroiliac joint fusion. Posterior fusion with pedicle screws and rods from L2 to L5. Hardware appears intact. Marked disc height loss at L1/L2, L2/L3 and L5/S1 with endplate sclerosis and osteophytes. Moderate disc height loss from L3 to L5. Minimal grade 1 anterolisthesis of L4 is similar.     Moderate bilateral facet joint arthropathy. Atherosclerotic vascular calcifications. IMPRESSION     1. Fusion L2-L5 with intact hardware. Similar grade 1 anterolisthesis of L4.   2. Lumbar spondylosis as above. Lumbar spine x-rays from 4/27/2021 were personally reviewed with the patient and demonstrated:  FINDINGS:     Mild S-shaped scoliosis. L2-5 posterior pedicle screw fixation with hardware intact and without adjacent lucency. Fixation left SI joint has occurred in the interim. Degenerative changes right SI joint.  Similar grade 1 anterolisthesis L4-5 and 3 mm retrolisthesis L1-2. Severe disc space narrowing L2-S1 is unchanged. Increased severe disc space narrowing L1-2 since prior imaging. Anterior endplate osteophytes. L5-S1 lumbar facet hypertrophy. IMPRESSION     Similar L2-5 fixation with interval left SI joint fixation. Similar grade 1 anterolisthesis L4-5 and minimal retrolisthesis L1-2. Progressed severe degenerative disc disease L1-2.          Lumbar CT from 9/14/2020 was personally reviewed with the patient and demonstrated:  FINDINGS:     The imaged lung bases are clear. No retroperitoneal lymphadenopathy. Imaged portions of the kidneys, spleen, adrenal glands, and liver are normal.    Spinal cord terminates at the L1 level. Posterior fusion spanning L2-L5 with bilateral pedicle screws, interlocking vertical bars and multilevel laminotomies. No perihardware lucency to suggest loosening or infection. The left L5 pedicle screw is with its tip just outside the anterior cortex of L5. No fluid collection within the surgical bed. Vertebral body heights are preserved. At T10-T11, there is a mild broad-based disc osteophyte complex without high-grade central canal or foraminal narrowing. At T11-T12, there is a mild broad-based disc osteophyte complex without high-grade central canal or foraminal narrowing. At T12-L1, there is a mild broad-based disc osteophyte complex without high-grade central canal or foraminal narrowing. L1-L2: There is disc desiccation with vacuum disc phenomenon and moderate intervertebral disc space narrowing. A mild broad-based disc bulge with mild bilateral foraminal narrowing. Mild bilateral facet arthropathy. Mild central canal narrowing at this level. L2-L3: There is broad-based disc osteophyte complex superimposed on mild/moderate facet arthropathy. Mild central canal narrowing. Mild bilateral foraminal narrowing. .      L3-L4: No high-grade central canal or foraminal narrowing.      L4-L5: There is grade 1 anterolisthesis of L4 with respect L5 with slight distortion of the thecal sac , without high-grade central canal narrowing. Mild/moderate right foraminal narrowing. Mild left foraminal narrowing. .      L5-S1: There is disc desiccation, vacuum disc phenomenon and moderate intervertebral disc space narrowing. Moderate/severe left facet arthropathy with synovial air. Mild/moderate right facet arthropathy. Asymmetric left moderate foraminal narrowing. Mild/moderate right foraminal narrowing. Small focus of air related to right facet arthropathy is noted within the right subarticular recess. Mild central canal narrowing at this level. IMPRESSION    Posterior fusion spanning L2-L5 without evidence of hardware complication. Disproportionate discogenic degenerative changes at L1-L2 and L5-S1. Foraminal narrowing is most advanced at L5-S1. Facet arthropathy is most advanced on the left at L5-S1. No high-grade central canal narrowing throughout the lumbar spinal column. Please refer to the body of the report for a comprehensive segmental analysis of the lumbar spinal column. Pelvic inlet/outlet x-rays from 10/28/2021 was personally reviewed with the patient and demonstrated:     FINDINGS: Bilateral sacroiliac iliac joint effusion. Sacroiliac joints appear intact. Hardware appears intact. Lower lumbar spine fusion hardware. IMPRESSION     1.  Bilateral sacroiliac joint fusion         Cervical spine 2V x-rays from 7/27/2020 were personally reviewed with the patient and demonstrated:  Diffuse degenerative changes with listhesis at C2-3.      Lumbar spine MRI from 02/24/2020 was personally reviewed with the patient and demonstrated:          Results from [facility] on 02/24/20   MRI LUMB SPINE WO CONT     Narrative MR lumbar spine without contrast     HISTORY: Chronic lumbar pain, acute bilateral low back pain with right-sided  sciatica, right leg weakness, previous lumbar fusion.     COMPARISON: MRI 9/25/2015     TECHNIQUE: Lumbar spine scanned with axial and sagittal T1W scans, sagittal  STIR, axial and sagittal T2W scans.     FINDINGS: L2-L5 posterolateral fusion. Stable grade 1 anterolisthesis of L4 on  L5. Mild dextroscoliosis. Normal vertebral body heights. Heterogeneous marrow  signal on the basis of endplate degenerative changes. L1 vertebral hemangioma. The conus medullaris is normal in signal and caliber ending at the T12-L1 disc  level. No clumping of the cauda equina nerve roots. Postoperative changes in the  paraspinal soft tissues. No fluid collection or mass.     Spinal canal and neural foramen: Limited visualization due to hardware related  artifact. Bulging disc and osteophyte complexes at T9-10 and T10-11 without  significant spinal canal stenosis.      T12-L1: Tiny left central disc protrusion, new. No spinal canal or foraminal  stenosis.     L1-2: Significant worsening of degenerative disc and facet disease. Disc is  narrowed with bulging disc and osteophyte complex. Superimposed left central  disc protrusion. Moderate facet and ligamentum flavum hypertrophy with trace  bilateral facet effusions. Mild to moderate spinal canal and right greater than  left foraminal stenoses. No exiting nerve root compression.     L2-3: Similar disc space narrowing with disc osteophyte complex and facet  arthropathy with ligamentum flavum thickening. Mild spinal canal and foraminal  stenoses, stable.     L3-4: Similar disc space narrowing. Effective decompression of the spinal canal  with no residual stenosis. Widely patent left neural foramen but there is  similar degree of moderate right foraminal stenosis.     L4-5: Similar disc space narrowing and disc bulge. Spinal canal is effectively  decompressed with no significant residual stenosis. Mild foraminal stenoses  without nerve root compression, stable.      L5-S1: Disc bulge and facet hypertrophy.  No spinal canal stenosis. Stable mild  to moderate left foraminal stenosis.       Impression IMPRESSION:     1.  Interval worsening of degenerative spinal disease most prominent at L1-2  where there is disc osteophyte complex and superimposed left central disc  protrusion producing mild to moderate spinal canal stenosis and right greater  than left foraminal stenoses. 2.  New tiny left central disc protrusion at T12-L1 without spinal stenosis. 3.  Effective decompression of the L3-4 and L4-5 spinal canal with mild residual  spinal stenosis at L2-3. Written by Perla Veloz, as dictated by David Feldman MD.  I, Dr. David Feldman confirm that all documentation is accurate.

## 2021-12-16 NOTE — PATIENT INSTRUCTIONS
Low Back Arthritis: Exercises  Introduction  Here are some examples of typical rehabilitation exercises for your condition. Start each exercise slowly. Ease off the exercise if you start to have pain. Your doctor or physical therapist will tell you when you can start these exercises and which ones will work best for you. When you are not being active, find a comfortable position for rest. Some people are comfortable on the floor or a medium-firm bed with a small pillow under their head and another under their knees. Some people prefer to lie on their side with a pillow between their knees. Don't stay in one position for too long. Take short walks (10 to 20 minutes) every 2 to 3 hours. Avoid slopes, hills, and stairs until you feel better. Walk only distances you can manage without pain, especially leg pain. How to do the exercises  Pelvic tilt    1. Lie on your back with your knees bent. 2. \"Brace\" your stomachtighten your muscles by pulling in and imagining your belly button moving toward your spine. 3. Press your lower back into the floor. You should feel your hips and pelvis rock back. 4. Hold for 6 seconds while breathing smoothly. 5. Relax and allow your pelvis and hips to rock forward. 6. Repeat 8 to 12 times. Back stretches    1. Get down on your hands and knees on the floor. 2. Relax your head and allow it to droop. Round your back up toward the ceiling until you feel a nice stretch in your upper, middle, and lower back. Hold this stretch for as long as it feels comfortable, or about 15 to 30 seconds. 3. Return to the starting position with a flat back while you are on your hands and knees. 4. Let your back sway by pressing your stomach toward the floor. Lift your buttocks toward the ceiling. 5. Hold this position for 15 to 30 seconds. 6. Repeat 2 to 4 times. Follow-up care is a key part of your treatment and safety.  Be sure to make and go to all appointments, and call your doctor if you are having problems. It's also a good idea to know your test results and keep a list of the medicines you take. Where can you learn more? Go to http://www.[employer].com/  Enter T094 in the search box to learn more about \"Low Back Arthritis: Exercises. \"  Current as of: July 1, 2021               Content Version: 13.0  © 2006-2021 [employer]. Care instructions adapted under license by [employer] (which disclaims liability or warranty for this information). If you have questions about a medical condition or this instruction, always ask your healthcare professional. [place] [address] any warranty or liability for your use of this information. Shoulder Arthritis: Exercises  Introduction  Here are some examples of exercises for you to try. The exercises may be suggested for a condition or for rehabilitation. Start each exercise slowly. Ease off the exercises if you start to have pain. You will be told when to start these exercises and which ones will work best for you. How to do the exercises  Shoulder flexion (lying down)    To make a wand for this exercise, use a piece of PVC pipe or a broom handle with the broom removed. Make the wand about a foot wider than your shoulders. 1. Lie on your back, holding a wand with both hands. Your palms should face down as you hold the wand. 2. Keeping your elbows straight, slowly raise your arms over your head. Raise them until you feel a stretch in your shoulders, upper back, and chest.  3. Hold for 15 to 30 seconds. 4. Repeat 2 to 4 times. Shoulder rotation (lying down)    To make a wand for this exercise, use a piece of PVC pipe or a broom handle with the broom removed. Make the wand about a foot wider than your shoulders. 1. Lie on your back. Hold a wand with both hands with your elbows bent and palms up.   2. Keep your elbows close to your body, and move the wand across your body toward the sore arm. 3. Hold for 8 to 12 seconds. 4. Repeat 2 to 4 times. Shoulder internal rotation with towel    1. Hold a towel above and behind your head with the arm that is not sore. 2. With your sore arm, reach behind your back and grasp the towel. 3. With the arm above your head, pull the towel upward. Do this until you feel a stretch on the front and outside of your sore shoulder. 4. Hold 15 to 30 seconds. 5. Repeat 2 to 4 times. Shoulder blade squeeze    1. Stand with your arms at your sides, and squeeze your shoulder blades together. Do not raise your shoulders up as you squeeze. 2. Hold 6 seconds. 3. Repeat 8 to 12 times. Resisted rows    For this exercise, you will need elastic exercise material, such as surgical tubing or Thera-Band. 1. Put the band around a solid object at about waist level. (A bedpost will work well.) Each hand should hold an end of the band. 2. With your elbows at your sides and bent to 90 degrees, pull the band back. Your shoulder blades should move toward each other. Return to the starting position. 3. Repeat 8 to 12 times. External rotator strengthening exercise    1. Start by tying a piece of elastic exercise material to a doorknob. You can use surgical tubing or Thera-Band. (You may also hold one end of the band in each hand.)  2. Stand or sit with your shoulder relaxed and your elbow bent 90 degrees. Your upper arm should rest comfortably against your side. Squeeze a rolled towel between your elbow and your body for comfort. This will help keep your arm at your side. 3. Hold one end of the elastic band with the hand of the painful arm. 4. Start with your forearm across your belly. Slowly rotate the forearm out away from your body. Keep your elbow and upper arm tucked against the towel roll or the side of your body until you begin to feel tightness in your shoulder. Slowly move your arm back to where you started. 5. Repeat 8 to 12 times.   Internal rotator strengthening exercise    1. Start by tying a piece of elastic exercise material to a doorknob. You can use surgical tubing or Thera-Band. 2. Stand or sit with your shoulder relaxed and your elbow bent 90 degrees. Your upper arm should rest comfortably against your side. Squeeze a rolled towel between your elbow and your body for comfort. This will help keep your arm at your side. 3. Hold one end of the elastic band in the hand of the painful arm. 4. Slowly rotate your forearm toward your body until it touches your belly. Slowly move it back to where you started. 5. Keep your elbow and upper arm firmly tucked against the towel roll or at your side. 6. Repeat 8 to 12 times. Pendulum swing    If you have pain in your back, do not do this exercise. 1. Hold on to a table or the back of a chair with your good arm. Then bend forward a little and let your sore arm hang straight down. This exercise does not use the arm muscles. Rather, use your legs and your hips to create movement that makes your arm swing freely. 2. Use the movement from your hips and legs to guide the slightly swinging arm back and forth like a pendulum (or elephant trunk). Then guide it in circles that start small (about the size of a dinner plate). Make the circles a bit larger each day, as your pain allows. 3. Do this exercise for 5 minutes, 5 to 7 times each day. 4. As you have less pain, try bending over a little farther to do this exercise. This will increase the amount of movement at your shoulder. Follow-up care is a key part of your treatment and safety. Be sure to make and go to all appointments, and call your doctor if you are having problems. It's also a good idea to know your test results and keep a list of the medicines you take. Where can you learn more? Go to http://www.gray.com/  Enter H562 in the search box to learn more about \"Shoulder Arthritis: Exercises. \"  Current as of: July 1, 2021               Content Version: 13.0  © 2687-6497 [employer]. Care instructions adapted under license by [email or web address] (which disclaims liability or warranty for this information). If you have questions about a medical condition or this instruction, always ask your healthcare professional. [address] any warranty or liability for your use of this information. Foot Arthritis: Exercises  Introduction  Here are some examples of exercises for you to try. The exercises may be suggested for a condition or for rehabilitation. Start each exercise slowly. Ease off the exercises if you start to have pain. You will be told when to start these exercises and which ones will work best for you. How to do the exercises  Calf stretch (knees straight)    5. Place a book on the floor a few inches from a wall or countertop, and put the balls of your feet on it. Your heels should be on the floor. The book needs to be thick enough so that you can feel a gentle stretch in your calf. If you are not steady on your feet, hold on to a chair, counter, or wall while you do this stretch. 6. Keep your knees straight, and lean forward until you feel a stretch in your calf. 7. To get more stretch, add another book or use a thicker book, such as a phone book, a dictionary, or an encyclopedia. 8. Hold the stretch for at least 15 to 30 seconds. 9. Repeat 2 to 4 times. Calf stretch (knees bent)    5. Place a book on the floor a few inches from a wall or countertop, and put the balls of your feet on it. Your heels should be on the floor. The book needs to be thick enough so that you can feel a gentle stretch in your calf. If you are not steady on your feet, hold on to a chair, counter, or wall while you do this stretch. 6. Bend your knees, and lean forward until you feel a stretch in your calf.   7. To get more stretch, add another book or use a thicker book, such as a phone book, a dictionary, or an encyclopedia. 8. Hold the stretch for at least 15 to 30 seconds. 9. Repeat 2 to 4 times. Great toe extension stretch    6. Sit in a chair, and put your affected foot across your other knee. 7. Grasp your heel with one hand and then slowly pull your big toe back with your other hand. Pull your toe back toward your ankle until you feel a stretch along the bottom of your foot. 8. Hold the stretch for at least 15 to 30 seconds. 9. Repeat 2 to 4 times. 10. Switch feet and repeat steps 1 through 4, even if only one foot is sore. Great toe flexion stretch    4. Sit in a chair, and put your affected foot across your other knee. 5. Grasp your heel with one hand and then slowly push your big toe down with your other hand. Push your toe down and away from your ankle until you feel a stretch along the top of your foot. 6. Hold the stretch for at least 15 to 30 seconds. 7. Repeat 2 to 4 times. 8. Switch feet and repeat steps 1 through 4, even if only one foot is sore. Ankle alphabet    4. Sit in a chair with your feet flat on the floor. (You can also do this exercise lying on your back with your affected leg propped up on a pillow). 5. Lift the heel of your sore foot off the floor, and slowly trace the letters of the alphabet. 6. Switch feet and repeat steps 1 through 2, even if only one foot is sore. Resisted ankle dorsiflexion    6. Tie the ends of an exercise band together to form a loop. Attach one end of the loop to a secure object or shut a door on it to hold it in place. (Or you can have someone hold one end of the loop to provide resistance.)  7. While sitting on the floor or in a chair, loop the other end of the band over the top of your affected foot. 8. Keeping your knee and leg straight, slowly flex your foot to pull back on the exercise band, and then slowly relax. 9. Repeat 8 to 12 times. 10. Switch feet and repeat steps 1 through 4, even if only one foot is sore. Towel curl    7.  While sitting, place your affected foot on a towel on the floor, and scrunch the towel toward you with your toes. 8. Then use your toes to push the towel away from you. 9. Repeat 8 to 12 times. 10. Switch feet and repeat steps 1 through 3, even if only one foot is sore. Resisted ankle eversion    5. Sit on the floor with your legs straight. 6. Hold both ends of an exercise band and loop the band around the outside of your affected foot. Then press your other foot against the band. 7. Keeping your leg straight, slowly push your affected foot outward against the band and away from your other foot without letting your leg rotate. Then slowly relax. 8. Repeat 8 to 12 times. 9. Switch feet and repeat steps 1 through 4, even if only one foot is sore. Resisted ankle inversion    1. Sit on the floor with your good leg crossed over your other leg. 2. Hold both ends of an exercise band and loop the band around the inside of your affected foot. Then press your other foot against the band. 3. Keeping your legs crossed, slowly push your affected foot against the band so that foot moves away from your other foot. Then slowly relax. 4. Repeat 8 to 12 times. 5. Switch feet and repeat steps 1 through 4, even if only one foot is sore. Follow-up care is a key part of your treatment and safety. Be sure to make and go to all appointments, and call your doctor if you are having problems. It's also a good idea to know your test results and keep a list of the medicines you take. Where can you learn more? Go to http://www.gray.com/  Enter K113 in the search box to learn more about \"Foot Arthritis: Exercises. \"  Current as of: July 1, 2021               Content Version: 13.0  © 1668-0951 [employer]. Care instructions adapted under license by [email or web address] (which disclaims liability or warranty for this information).  If you have questions about a medical condition or this instruction, always ask your healthcare professional. [place] [address] any warranty or liability for your use of this information.

## 2021-12-20 ENCOUNTER — HOSPITAL ENCOUNTER (OUTPATIENT)
Age: 76
Discharge: HOME | End: 2021-12-20
Attending: PHYSICAL MEDICINE & REHABILITATION
Payer: MEDICARE

## 2021-12-20 PROCEDURE — 72148 MRI LUMBAR SPINE W/O DYE: CPT

## 2022-01-03 ENCOUNTER — TRANSCRIBE ORDER (OUTPATIENT)
Dept: SCHEDULING | Age: 77
End: 2022-01-03

## 2022-01-03 DIAGNOSIS — R19.4 FREQUENT BOWEL MOVEMENTS: Primary | ICD-10-CM

## 2022-01-27 ENCOUNTER — OFFICE VISIT (OUTPATIENT)
Dept: ORTHOPEDIC SURGERY | Age: 77
End: 2022-01-27
Payer: MEDICARE

## 2022-01-27 VITALS
HEIGHT: 64 IN | WEIGHT: 197 LBS | TEMPERATURE: 97 F | HEART RATE: 80 BPM | OXYGEN SATURATION: 94 % | BODY MASS INDEX: 33.63 KG/M2

## 2022-01-27 DIAGNOSIS — M62.838 MUSCLE SPASM: ICD-10-CM

## 2022-01-27 DIAGNOSIS — M51.34 DDD (DEGENERATIVE DISC DISEASE), THORACIC: ICD-10-CM

## 2022-01-27 DIAGNOSIS — Z79.01 CHRONIC ANTICOAGULATION: ICD-10-CM

## 2022-01-27 DIAGNOSIS — Z98.1 HISTORY OF LUMBAR FUSION: ICD-10-CM

## 2022-01-27 DIAGNOSIS — M19.012 OSTEOARTHRITIS OF LEFT SHOULDER, UNSPECIFIED OSTEOARTHRITIS TYPE: ICD-10-CM

## 2022-01-27 DIAGNOSIS — M47.816 LUMBAR FACET ARTHROPATHY: Primary | ICD-10-CM

## 2022-01-27 DIAGNOSIS — M53.3 SACROILIAC JOINT DYSFUNCTION: ICD-10-CM

## 2022-01-27 PROCEDURE — G8432 DEP SCR NOT DOC, RNG: HCPCS | Performed by: PHYSICAL MEDICINE & REHABILITATION

## 2022-01-27 PROCEDURE — 1101F PT FALLS ASSESS-DOCD LE1/YR: CPT | Performed by: PHYSICAL MEDICINE & REHABILITATION

## 2022-01-27 PROCEDURE — G8427 DOCREV CUR MEDS BY ELIG CLIN: HCPCS | Performed by: PHYSICAL MEDICINE & REHABILITATION

## 2022-01-27 PROCEDURE — G8536 NO DOC ELDER MAL SCRN: HCPCS | Performed by: PHYSICAL MEDICINE & REHABILITATION

## 2022-01-27 PROCEDURE — G8417 CALC BMI ABV UP PARAM F/U: HCPCS | Performed by: PHYSICAL MEDICINE & REHABILITATION

## 2022-01-27 PROCEDURE — 99214 OFFICE O/P EST MOD 30 MIN: CPT | Performed by: PHYSICAL MEDICINE & REHABILITATION

## 2022-01-27 PROCEDURE — 3017F COLORECTAL CA SCREEN DOC REV: CPT | Performed by: PHYSICAL MEDICINE & REHABILITATION

## 2022-01-27 PROCEDURE — G8400 PT W/DXA NO RESULTS DOC: HCPCS | Performed by: PHYSICAL MEDICINE & REHABILITATION

## 2022-01-27 PROCEDURE — 1090F PRES/ABSN URINE INCON ASSESS: CPT | Performed by: PHYSICAL MEDICINE & REHABILITATION

## 2022-01-27 PROCEDURE — G8756 NO BP MEASURE DOC: HCPCS | Performed by: PHYSICAL MEDICINE & REHABILITATION

## 2022-01-27 RX ORDER — DICLOFENAC SODIUM 10 MG/G
GEL TOPICAL
COMMUNITY
Start: 2022-01-04

## 2022-01-27 RX ORDER — DICLOFENAC EPOLAMINE 0.01 G/1
PATCH TOPICAL
Qty: 60 PATCH | Refills: 2 | Status: SHIPPED | OUTPATIENT
Start: 2022-01-27

## 2022-01-27 RX ORDER — OMEPRAZOLE 40 MG/1
CAPSULE, DELAYED RELEASE ORAL
COMMUNITY
Start: 2022-01-04

## 2022-01-27 RX ORDER — DIAZEPAM 10 MG/1
TABLET ORAL
COMMUNITY
Start: 2022-01-07

## 2022-01-27 NOTE — PATIENT INSTRUCTIONS
Low Back Arthritis: Exercises  Introduction  Here are some examples of typical rehabilitation exercises for your condition. Start each exercise slowly. Ease off the exercise if you start to have pain. Your doctor or physical therapist will tell you when you can start these exercises and which ones will work best for you. When you are not being active, find a comfortable position for rest. Some people are comfortable on the floor or a medium-firm bed with a small pillow under their head and another under their knees. Some people prefer to lie on their side with a pillow between their knees. Don't stay in one position for too long. Take short walks (10 to 20 minutes) every 2 to 3 hours. Avoid slopes, hills, and stairs until you feel better. Walk only distances you can manage without pain, especially leg pain. How to do the exercises  Pelvic tilt    1. Lie on your back with your knees bent. 2. \"Brace\" your stomachtighten your muscles by pulling in and imagining your belly button moving toward your spine. 3. Press your lower back into the floor. You should feel your hips and pelvis rock back. 4. Hold for 6 seconds while breathing smoothly. 5. Relax and allow your pelvis and hips to rock forward. 6. Repeat 8 to 12 times. Back stretches    1. Get down on your hands and knees on the floor. 2. Relax your head and allow it to droop. Round your back up toward the ceiling until you feel a nice stretch in your upper, middle, and lower back. Hold this stretch for as long as it feels comfortable, or about 15 to 30 seconds. 3. Return to the starting position with a flat back while you are on your hands and knees. 4. Let your back sway by pressing your stomach toward the floor. Lift your buttocks toward the ceiling. 5. Hold this position for 15 to 30 seconds. 6. Repeat 2 to 4 times. Follow-up care is a key part of your treatment and safety.  Be sure to make and go to all appointments, and call your doctor if you are having problems. It's also a good idea to know your test results and keep a list of the medicines you take. Where can you learn more? Go to http://www.[employer].com/  Enter T094 in the search box to learn more about \"Low Back Arthritis: Exercises. \"  Current as of: July 1, 2021               Content Version: 13.0  © 2006-2021 [employer]. Care instructions adapted under license by [employer] (which disclaims liability or warranty for this information). If you have questions about a medical condition or this instruction, always ask your healthcare professional. [place] [address] any warranty or liability for your use of this information. Foot Arthritis: Exercises  Introduction  Here are some examples of exercises for you to try. The exercises may be suggested for a condition or for rehabilitation. Start each exercise slowly. Ease off the exercises if you start to have pain. You will be told when to start these exercises and which ones will work best for you. How to do the exercises  Calf stretch (knees straight)    1. Place a book on the floor a few inches from a wall or countertop, and put the balls of your feet on it. Your heels should be on the floor. The book needs to be thick enough so that you can feel a gentle stretch in your calf. If you are not steady on your feet, hold on to a chair, counter, or wall while you do this stretch. 2. Keep your knees straight, and lean forward until you feel a stretch in your calf. 3. To get more stretch, add another book or use a thicker book, such as a phone book, a dictionary, or an encyclopedia. 4. Hold the stretch for at least 15 to 30 seconds. 5. Repeat 2 to 4 times. Calf stretch (knees bent)    1. Place a book on the floor a few inches from a wall or countertop, and put the balls of your feet on it. Your heels should be on the floor.  The book needs to be thick enough so that you can feel a gentle stretch in your calf. If you are not steady on your feet, hold on to a chair, counter, or wall while you do this stretch. 2. Bend your knees, and lean forward until you feel a stretch in your calf. 3. To get more stretch, add another book or use a thicker book, such as a phone book, a dictionary, or an encyclopedia. 4. Hold the stretch for at least 15 to 30 seconds. 5. Repeat 2 to 4 times. Great toe extension stretch    1. Sit in a chair, and put your affected foot across your other knee. 2. Grasp your heel with one hand and then slowly pull your big toe back with your other hand. Pull your toe back toward your ankle until you feel a stretch along the bottom of your foot. 3. Hold the stretch for at least 15 to 30 seconds. 4. Repeat 2 to 4 times. 5. Switch feet and repeat steps 1 through 4, even if only one foot is sore. Great toe flexion stretch    1. Sit in a chair, and put your affected foot across your other knee. 2. Grasp your heel with one hand and then slowly push your big toe down with your other hand. Push your toe down and away from your ankle until you feel a stretch along the top of your foot. 3. Hold the stretch for at least 15 to 30 seconds. 4. Repeat 2 to 4 times. 5. Switch feet and repeat steps 1 through 4, even if only one foot is sore. Ankle alphabet    1. Sit in a chair with your feet flat on the floor. (You can also do this exercise lying on your back with your affected leg propped up on a pillow). 2. Lift the heel of your sore foot off the floor, and slowly trace the letters of the alphabet. 3. Switch feet and repeat steps 1 through 2, even if only one foot is sore. Resisted ankle dorsiflexion    1. Tie the ends of an exercise band together to form a loop. Attach one end of the loop to a secure object or shut a door on it to hold it in place. (Or you can have someone hold one end of the loop to provide resistance.)  2.  While sitting on the floor or in a chair, loop the other end of the band over the top of your affected foot. 3. Keeping your knee and leg straight, slowly flex your foot to pull back on the exercise band, and then slowly relax. 4. Repeat 8 to 12 times. 5. Switch feet and repeat steps 1 through 4, even if only one foot is sore. Towel curl    1. While sitting, place your affected foot on a towel on the floor, and scrunch the towel toward you with your toes. 2. Then use your toes to push the towel away from you. 3. Repeat 8 to 12 times. 4. Switch feet and repeat steps 1 through 3, even if only one foot is sore. Resisted ankle eversion    1. Sit on the floor with your legs straight. 2. Hold both ends of an exercise band and loop the band around the outside of your affected foot. Then press your other foot against the band. 3. Keeping your leg straight, slowly push your affected foot outward against the band and away from your other foot without letting your leg rotate. Then slowly relax. 4. Repeat 8 to 12 times. 5. Switch feet and repeat steps 1 through 4, even if only one foot is sore. Resisted ankle inversion    1. Sit on the floor with your good leg crossed over your other leg. 2. Hold both ends of an exercise band and loop the band around the inside of your affected foot. Then press your other foot against the band. 3. Keeping your legs crossed, slowly push your affected foot against the band so that foot moves away from your other foot. Then slowly relax. 4. Repeat 8 to 12 times. 5. Switch feet and repeat steps 1 through 4, even if only one foot is sore. Follow-up care is a key part of your treatment and safety. Be sure to make and go to all appointments, and call your doctor if you are having problems. It's also a good idea to know your test results and keep a list of the medicines you take. Where can you learn more?   Go to http://www.[employer].com/  Enter K113 in the search box to learn more about \"Foot Arthritis: Exercises. \"  Current as of: July 1, 2021               Content Version: 13.0  © 9747-1331 [employer]. Care instructions adapted under license by [employer] (which disclaims liability or warranty for this information). If you have questions about a medical condition or this instruction, always ask your healthcare professional. [place] [address] any warranty or liability for your use of this information. Shoulder Arthritis: Exercises  Introduction  Here are some examples of exercises for you to try. The exercises may be suggested for a condition or for rehabilitation. Start each exercise slowly. Ease off the exercises if you start to have pain. You will be told when to start these exercises and which ones will work best for you. How to do the exercises  Shoulder flexion (lying down)    To make a wand for this exercise, use a piece of PVC pipe or a broom handle with the broom removed. Make the wand about a foot wider than your shoulders. 6. Lie on your back, holding a wand with both hands. Your palms should face down as you hold the wand. 7. Keeping your elbows straight, slowly raise your arms over your head. Raise them until you feel a stretch in your shoulders, upper back, and chest.  8. Hold for 15 to 30 seconds. 9. Repeat 2 to 4 times. Shoulder rotation (lying down)    To make a wand for this exercise, use a piece of PVC pipe or a broom handle with the broom removed. Make the wand about a foot wider than your shoulders. 6. Lie on your back. Hold a wand with both hands with your elbows bent and palms up. 7. Keep your elbows close to your body, and move the wand across your body toward the sore arm. 8. Hold for 8 to 12 seconds. 9. Repeat 2 to 4 times. Shoulder internal rotation with towel    6. Hold a towel above and behind your head with the arm that is not sore. 7. With your sore arm, reach behind your back and grasp the towel.   8. With the arm above your head, pull the towel upward. Do this until you feel a stretch on the front and outside of your sore shoulder. 9. Hold 15 to 30 seconds. 10. Repeat 2 to 4 times. Shoulder blade squeeze    6. Stand with your arms at your sides, and squeeze your shoulder blades together. Do not raise your shoulders up as you squeeze. 7. Hold 6 seconds. 8. Repeat 8 to 12 times. Resisted rows    For this exercise, you will need elastic exercise material, such as surgical tubing or Thera-Band. 4. Put the band around a solid object at about waist level. (A bedpost will work well.) Each hand should hold an end of the band. 5. With your elbows at your sides and bent to 90 degrees, pull the band back. Your shoulder blades should move toward each other. Return to the starting position. 6. Repeat 8 to 12 times. External rotator strengthening exercise    6. Start by tying a piece of elastic exercise material to a doorknob. You can use surgical tubing or Thera-Band. (You may also hold one end of the band in each hand.)  7. Stand or sit with your shoulder relaxed and your elbow bent 90 degrees. Your upper arm should rest comfortably against your side. Squeeze a rolled towel between your elbow and your body for comfort. This will help keep your arm at your side. 8. Hold one end of the elastic band with the hand of the painful arm. 9. Start with your forearm across your belly. Slowly rotate the forearm out away from your body. Keep your elbow and upper arm tucked against the towel roll or the side of your body until you begin to feel tightness in your shoulder. Slowly move your arm back to where you started. 10. Repeat 8 to 12 times. Internal rotator strengthening exercise    5. Start by tying a piece of elastic exercise material to a doorknob. You can use surgical tubing or Thera-Band. 6. Stand or sit with your shoulder relaxed and your elbow bent 90 degrees. Your upper arm should rest comfortably against your side.  Squeeze a rolled towel between your elbow and your body for comfort. This will help keep your arm at your side. 7. Hold one end of the elastic band in the hand of the painful arm. 8. Slowly rotate your forearm toward your body until it touches your belly. Slowly move it back to where you started. 9. Keep your elbow and upper arm firmly tucked against the towel roll or at your side. 10. Repeat 8 to 12 times. Pendulum swing    If you have pain in your back, do not do this exercise. 6. Hold on to a table or the back of a chair with your good arm. Then bend forward a little and let your sore arm hang straight down. This exercise does not use the arm muscles. Rather, use your legs and your hips to create movement that makes your arm swing freely. 7. Use the movement from your hips and legs to guide the slightly swinging arm back and forth like a pendulum (or elephant trunk). Then guide it in circles that start small (about the size of a dinner plate). Make the circles a bit larger each day, as your pain allows. 8. Do this exercise for 5 minutes, 5 to 7 times each day. 9. As you have less pain, try bending over a little farther to do this exercise. This will increase the amount of movement at your shoulder. Follow-up care is a key part of your treatment and safety. Be sure to make and go to all appointments, and call your doctor if you are having problems. It's also a good idea to know your test results and keep a list of the medicines you take. Where can you learn more? Go to http://www.gray.com/  Enter H562 in the search box to learn more about \"Shoulder Arthritis: Exercises. \"  Current as of: July 1, 2021               Content Version: 13.0  © 3762-4450 [employer], [employer]. Care instructions adapted under license by [employer] (which disclaims liability or warranty for this information).  If you have questions about a medical condition or this instruction, always ask your healthcare professional. [address] any warranty or liability for your use of this information.

## 2022-01-27 NOTE — PROGRESS NOTES
[facility]  [address] [facility], [facility] [place]   Phone: [phone]  Fax: [phone]    Pt's YOB: 1945    ASSESSMENT   Diagnoses and all orders for this visit:    1. Lumbar facet arthropathy    2. Muscle spasm    3. Chronic anticoagulation    4. Osteoarthritis of left shoulder, unspecified osteoarthritis type  -     diclofenac (Flector) 1.3 % pt12; Apply 1 patch BID to the left shoulder as directed. 5. Sacroiliac joint dysfunction    6. DDD (degenerative disc disease), thoracic    7. History of lumbar fusion         IMPRESSION AND PLAN:  Derrick Tsang is a 78 y.o. female with history of lumbar pain. Pt complains of continued lower lumbar pain. Pt uses Voltaren gel on her back with relief, takes Tylenol #3 intermittently as needed, and continues to take Eliquis. 1) Pt was given information on lumbar, shoulder, and foot arthritis exercises. 2) Pt was prescribed Flector 1.3% patches 1 patch every 12 hours as needed. 3) Pt is not a candidate for NSAID's due to chronic anticoagulation with Eliquis. 4) Ms. Khari Gilbert has a reminder for a \"due or due soon\" health maintenance. I have asked that she contact her primary care provider, Wilian Asif MD, for follow-up on this health maintenance. 5)  demonstrated consistency with prescribing. Follow-up and Dispositions    · Return in about 3 months (around 4/27/2022) for Medication follow up. HISTORY OF PRESENT ILLNESS:  Derrick Tsang is a 78 y.o. female with history of lumbar pain and presents to the office today for MRI follow up. Pt complains of continued lower lumbar pain. She denies that her pain is exacerbated by the colder weather and notes improvement with bending forward. Pt uses Voltaren gel on her back with relief, takes Tylenol #3 intermittently as needed, and continues to take Eliquis.  She reports that she has increased her calcium supplementation with relief of her night sweats. Pt notes relief of her leg pain since a bilateral sacroiliac fusion. She reports that she is scheduled for a left shoulder replacement by Dr. Rene Nur and has previously undergone a right shoulder replacement. She also notes a history of a damaged tendon in the left ankle. Pt further notes foot pain with walking barefoot due to foot arthritis. Pt at this time desires to proceed with medication evaluation. Of note, pt presents to today's office visit ambulating with the assistance of a single point cane and wearing a left ankle support. Pain Scale: 4/10    PCP: Bessy Allred MD     Past Medical History:   Diagnosis Date    Arthritis     Hypercholesterolemia     Hypertension     Ill-defined condition     osteopenia    Lower back pain     Migraine     PMR [facility]polymyalgia rheumatica) ([facility])     Thyroid disease         Social History     Socioeconomic History    Marital status:      Spouse name: Not on file    Number of children: Not on file    Years of education: Not on file    Highest education level: Not on file   Occupational History    Not on file   Tobacco Use    Smoking status: Former Smoker     Packs/day: 1.00     Years: 5.00     Pack years: 5.00     Quit date: 1970     Years since quittin.1    Smokeless tobacco: Never Used   Substance and Sexual Activity    Alcohol use:  Yes     Alcohol/week: 0.8 standard drinks     Types: 1 Glasses of wine per week     Comment: 4 nights a week    Drug use: No    Sexual activity: Not on file     Comment: Hysterectomy   Other Topics Concern    Not on file   Social History Narrative    Not on file     Social Determinants of Health     Financial Resource Strain:     Difficulty of Paying Living Expenses: Not on file   Food Insecurity:     Worried About Running Out of Food in the Last Year: Not on file    Poornima of Food in the Last Year: Not on file   Transportation Needs:     Lack of Transportation (Medical): Not on file    Lack of Transportation (Non-Medical): Not on file   Physical Activity:     Days of Exercise per Week: Not on file    Minutes of Exercise per Session: Not on file   Stress:     Feeling of Stress : Not on file   Social Connections:     Frequency of Communication with Friends and Family: Not on file    Frequency of Social Gatherings with Friends and Family: Not on file    Attends [employer] Services: Not on file    Active Member of [address] [place] [address] or Organizations: Not on file    Attends Club or Organization Meetings: Not on file    Marital Status: Not on file   Intimate Partner Violence:     Fear of Current or Ex-Partner: Not on file    Emotionally Abused: Not on file    Physically Abused: Not on file    Sexually Abused: Not on file   Housing Stability:     Unable to Pay for Housing in the Last Year: Not on file    Number of [place] in the Last Year: Not on file    Unstable Housing in the Last Year: Not on file       Current Outpatient Medications   Medication Sig Dispense Refill    diazePAM (VALIUM) 10 mg tablet       diclofenac (VOLTAREN) 1 % gel       omeprazole (PRILOSEC) 40 mg capsule TAKE 1 CAPSULE BY MOUTH DAILY 30 MINUTES BEFORE BREAKFAST      diclofenac (Flector) 1.3 % pt12 Apply 1 patch BID to the left shoulder as directed. 60 Patch 2    calcium phosphate-vitamin D3 (Citracal-D3 Gummies) 250 mg-12.5 mcg (500 unit) chew Citracal-D3 Gummies 250 mg-12.5 mcg (500 unit) chewable tablet   Take 2 tablets every day by oral route.  metoprolol tartrate (LOPRESSOR) 25 mg tablet TAKE 1 TABLET TWICE A DAY      lidocaine (LIDODERM) 5 % APPLY PATCH TO THE AFFECTED AREA FOR 12 HOURS A DAY AND REMOVE FOR 12 HOURS A DAY 60 Each 5    thyroid, Pork, (ARMOUR THYROID) 60 mg tablet Take 60 mg by mouth daily. Plus 50 mg qdaily      apixaban (ELIQUIS) 5 mg tablet Take 5 mg by mouth two (2) times a day.  flecainide acetate (FLECAINIDE PO) Take  by mouth two (2) times a day.       predniSONE (DELTASONE) 1 mg tablet Take 3 mg by mouth daily.  DULoxetine (CYMBALTA) 60 mg capsule Take 60 mg by mouth daily.  rosuvastatin (CRESTOR) 20 mg tablet Take 20 mg by mouth nightly.  cycloSPORINE (RESTASIS) 0.05 % ophthalmic emulsion Administer 1 Drop to both eyes two (2) times a day.  benazepril (LOTENSIN) 40 mg tablet Take 40 mg by mouth daily.  metFORMIN (GLUCOPHAGE) 500 mg tablet 1 tablet with a meal (Patient not taking: Reported on 12/16/2021)         Allergies   Allergen Reactions    Adhesive Tape-Silicones Other (comments)    Levaquin [Levofloxacin] Nausea and Vomiting         REVIEW OF SYSTEMS    Constitutional: Negative for fever, chills, or weight change. Respiratory: Negative for cough or shortness of breath. Cardiovascular: Negative for chest pain or palpitations. Gastrointestinal: Negative for acid reflux, change in bowel habits, or constipation. Genitourinary: Negative for dysuria and flank pain. Musculoskeletal: Positive for lumbar and left ankle pain. Skin: Negative for rash. Neurological: Negative for headaches, dizziness, or numbness. Endo/Heme/Allergies: Negative for increased bruising. Psychiatric/Behavioral: Negative for difficulty with sleep. As per HPI    PHYSICAL EXAMINATION  Visit Vitals  Pulse 80   Temp 97 °F (36.1 °C) (Skin)   Ht 5' 3.5\" (1.613 m)   Wt 197 lb (89.4 kg)   SpO2 94%   BMI 34.35 kg/m²       Constitutional: Awake, alert, and in no acute distress. Neurological:  Sensation to light touch is intact. Skin: warm, dry, and intact. Musculoskeletal: Moderate pain with extension, axial loading, or forward flexion. No pain with internal or external rotation of her hips. Negative straight leg raise bilaterally. Patient ambulates with the assistance of a single point cane. Pt is wearing a left ankle support.       Biceps  Triceps Deltoids Wrist Ext Wrist Flex Hand Intrin   Right +4/5 +4/5 +4/5 +4/5 +4/5 +4/5   Left +4/5 +4/5 +4/5 +4/5 +4/5 +4/5      Hip Flex  Quads Hamstrings Ankle DF EHL Ankle PF   Right +4/5 +4/5 +4/5 +4/5 +4/5 +4/5   Left +4/5 +4/5 +4/5 +4/5 +4/5 +4/5     IMAGING:    Lumbar spine MRI from 12/20/2021 was personally reviewed with the patient and demonstrated:    FINDINGS:     Bone marrow signal is heterogeneous which is nonspecific but unchanged.     Interval development in multilevel endplate edema most pronounced on T10-L1.     Multilevel disc height loss and endplate osteophyte formation grossly unchanged.     Trace anterolisthesis L4 on L5 stable.     Posterior body fusion extending from L2 to L5 grossly unchanged. Fixation across  the sacroiliac joints is new when compared to prior exam. Blooming artifact from  metallic hardware limits evaluation. Extensive stranding and edema noted within  the posterior soft tissues, unchanged.     Mild dextroscoliosis stable. Vertebral body heights unchanged. L1 hemangioma  unchanged.     Conus terminates at T12-L1 and is otherwise unremarkable.     Bulging disc and osteophyte complex again noted at T9-T10 and T10-T11. Question  slight progression with now mild spinal canal narrowing and mild-to-moderate  neuroforaminal narrowing most pronounced at T10-T11.     T11-T12 demonstrates interval progression of broad-based bulging asymmetric to  the left with progression of neuroforaminal narrowing. Spinal canal narrowing  grossly unchanged.     T12-L1: No definite spinal canal or neuroforaminal narrowing.     L1-L2: Advanced facet arthropathy again noted. Bilobed bulging again noted. This  appears slightly decreased when compared to prior exam. Lateral recess narrowing  persist with some central canal narrowing grossly similar to mildly improved. Grossly similar neuroforaminal narrowing. Exam limited due to blooming artifact.     L2-L3: Ligamentum flavum hypertrophy and facet arthropathy again noted. Disc  osteophyte again noted. Question slight progression in spinal canal narrowing.   Question slight progression in neuroforaminal narrowing.     L3-L4: Spinal canal is decompressed disc osteophyte complex again noted. Probable persistent right neuroforaminal narrowing. Left neuroforamen grossly  similar.     L4-L5: Spinal canal is decompressed. Disc osteophyte complex again noted. Neuroforaminal narrowing stable.     L5-S1: Grossly similar facet arthropathy. Disc bulge unchanged. No definite  spinal canal narrowing. Neuroforaminal narrowing grossly unchanged.        IMPRESSION     Interval sacroiliac joint fixation.     Posterior body fixation lumbar spine, unchanged which limits evaluation.     Interval development of marked endplate edema within the lower thoracic and  upper lumbar spine.     Interval progression in overall multilevel degenerative findings causing various  degrees of spinal canal and neuroforaminal narrowing. Lumbar spine x-rays from 10/28/2021 were personally reviewed with the patient and demonstrated:     FINDINGS:     Similar levoscoliosis. Bilateral sacroiliac joint fusion. Posterior fusion with pedicle screws and rods from L2 to L5. Hardware appears intact. Marked disc height loss at L1/L2, L2/L3 and L5/S1 with endplate sclerosis and osteophytes. Moderate disc height loss from L3 to L5. Minimal grade 1 anterolisthesis of L4 is similar.     Moderate bilateral facet joint arthropathy. Atherosclerotic vascular calcifications. IMPRESSION     1. Fusion L2-L5 with intact hardware. Similar grade 1 anterolisthesis of L4.   2. Lumbar spondylosis as above.           Lumbar CT from 9/14/2020 was personally reviewed and demonstrated:  FINDINGS:     The imaged lung bases are clear. No retroperitoneal lymphadenopathy. Imaged portions of the kidneys, spleen, adrenal glands, and liver are normal.    Spinal cord terminates at the L1 level. Posterior fusion spanning L2-L5 with bilateral pedicle screws, interlocking vertical bars and multilevel laminotomies.  No perihardware lucency to suggest loosening or infection. The left L5 pedicle screw is with its tip just outside the anterior cortex of L5. No fluid collection within the surgical bed. Vertebral body heights are preserved. At T10-T11, there is a mild broad-based disc osteophyte complex without high-grade central canal or foraminal narrowing. At T11-T12, there is a mild broad-based disc osteophyte complex without high-grade central canal or foraminal narrowing. At T12-L1, there is a mild broad-based disc osteophyte complex without high-grade central canal or foraminal narrowing. L1-L2: There is disc desiccation with vacuum disc phenomenon and moderate intervertebral disc space narrowing. A mild broad-based disc bulge with mild bilateral foraminal narrowing. Mild bilateral facet arthropathy. Mild central canal narrowing at this level. L2-L3: There is broad-based disc osteophyte complex superimposed on mild/moderate facet arthropathy. Mild central canal narrowing. Mild bilateral foraminal narrowing. .      L3-L4: No high-grade central canal or foraminal narrowing.      L4-L5: There is grade 1 anterolisthesis of L4 with respect L5 with slight distortion of the thecal sac , without high-grade central canal narrowing. Mild/moderate right foraminal narrowing. Mild left foraminal narrowing. .      L5-S1: There is disc desiccation, vacuum disc phenomenon and moderate intervertebral disc space narrowing. Moderate/severe left facet arthropathy with synovial air. Mild/moderate right facet arthropathy. Asymmetric left moderate foraminal narrowing. Mild/moderate right foraminal narrowing. Small focus of air related to right facet arthropathy is noted within the right subarticular recess. Mild central canal narrowing at this level. IMPRESSION    Posterior fusion spanning L2-L5 without evidence of hardware complication. Disproportionate discogenic degenerative changes at L1-L2 and L5-S1.      Foraminal narrowing is most advanced at L5-S1. Facet arthropathy is most advanced on the left at L5-S1. No high-grade central canal narrowing throughout the lumbar spinal column. Please refer to the body of the report for a comprehensive segmental analysis of the lumbar spinal column.     Pelvic inlet/outlet x-rays from 10/28/2021 was personally reviewed and demonstrated:      FINDINGS: Bilateral sacroiliac iliac joint effusion. Sacroiliac joints appear intact. Hardware appears intact. Lower lumbar spine fusion hardware. IMPRESSION     1. Bilateral sacroiliac joint fusion          Cervical spine 2V x-rays from 7/27/2020 were personally reviewed and demonstrated:  Diffuse degenerative changes with listhesis at C2-3.       Written by Sarah Hoang, as dictated by Artie Sutton MD.  I, Dr. Artie Sutton confirm that all documentation is accurate.

## 2022-03-19 PROBLEM — Z98.1 S/P LUMBAR FUSION: Status: ACTIVE | Noted: 2017-03-15

## 2022-03-19 PROBLEM — E66.01 SEVERE OBESITY (HCC): Status: ACTIVE | Noted: 2020-06-01

## 2022-05-02 ENCOUNTER — TELEPHONE (OUTPATIENT)
Dept: ORTHOPEDIC SURGERY | Age: 77
End: 2022-05-02

## 2022-05-02 NOTE — TELEPHONE ENCOUNTER
She had appt with Dr. Chani Lopez on 3/31/22 and 4/28/22 which she cancelled. She needs to reschedule with Dr Chani Lopez, next available.

## 2022-05-02 NOTE — TELEPHONE ENCOUNTER
Patient called stating Dr. Monico Blanca has referred her to see  to have a back injection. Please advise. Patient can be reached at [phone].

## 2022-05-05 ENCOUNTER — OFFICE VISIT (OUTPATIENT)
Dept: ORTHOPEDIC SURGERY | Age: 77
End: 2022-05-05
Payer: MEDICARE

## 2022-05-05 VITALS
HEIGHT: 63 IN | BODY MASS INDEX: 35.9 KG/M2 | HEART RATE: 96 BPM | TEMPERATURE: 97 F | OXYGEN SATURATION: 97 % | WEIGHT: 202.6 LBS

## 2022-05-05 DIAGNOSIS — M47.816 LUMBAR FACET ARTHROPATHY: ICD-10-CM

## 2022-05-05 DIAGNOSIS — M53.3 SACROILIAC JOINT DYSFUNCTION: ICD-10-CM

## 2022-05-05 DIAGNOSIS — M62.838 MUSCLE SPASM: ICD-10-CM

## 2022-05-05 DIAGNOSIS — M54.42 ACUTE MIDLINE LOW BACK PAIN WITH LEFT-SIDED SCIATICA: Primary | ICD-10-CM

## 2022-05-05 PROCEDURE — G8536 NO DOC ELDER MAL SCRN: HCPCS | Performed by: PHYSICAL MEDICINE & REHABILITATION

## 2022-05-05 PROCEDURE — G8417 CALC BMI ABV UP PARAM F/U: HCPCS | Performed by: PHYSICAL MEDICINE & REHABILITATION

## 2022-05-05 PROCEDURE — G8432 DEP SCR NOT DOC, RNG: HCPCS | Performed by: PHYSICAL MEDICINE & REHABILITATION

## 2022-05-05 PROCEDURE — 99214 OFFICE O/P EST MOD 30 MIN: CPT | Performed by: PHYSICAL MEDICINE & REHABILITATION

## 2022-05-05 PROCEDURE — 1090F PRES/ABSN URINE INCON ASSESS: CPT | Performed by: PHYSICAL MEDICINE & REHABILITATION

## 2022-05-05 PROCEDURE — G8427 DOCREV CUR MEDS BY ELIG CLIN: HCPCS | Performed by: PHYSICAL MEDICINE & REHABILITATION

## 2022-05-05 PROCEDURE — G8756 NO BP MEASURE DOC: HCPCS | Performed by: PHYSICAL MEDICINE & REHABILITATION

## 2022-05-05 PROCEDURE — 1101F PT FALLS ASSESS-DOCD LE1/YR: CPT | Performed by: PHYSICAL MEDICINE & REHABILITATION

## 2022-05-05 PROCEDURE — G8400 PT W/DXA NO RESULTS DOC: HCPCS | Performed by: PHYSICAL MEDICINE & REHABILITATION

## 2022-05-05 PROCEDURE — 3017F COLORECTAL CA SCREEN DOC REV: CPT | Performed by: PHYSICAL MEDICINE & REHABILITATION

## 2022-05-05 RX ORDER — OXYCODONE AND ACETAMINOPHEN 5; 325 MG/1; MG/1
1 TABLET ORAL
Qty: 28 TABLET | Refills: 0
Start: 2022-05-05 | End: 2022-05-06 | Stop reason: SDUPTHER

## 2022-05-05 RX ORDER — KETOROLAC TROMETHAMINE 30 MG/ML
30 INJECTION, SOLUTION INTRAMUSCULAR; INTRAVENOUS
Status: CANCELLED | OUTPATIENT
Start: 2022-05-05 | End: 2022-05-05

## 2022-05-05 RX ORDER — ACETAMINOPHEN 500 MG
TABLET ORAL
COMMUNITY
Start: 2022-02-05

## 2022-05-05 NOTE — PROGRESS NOTES
[facility]  [address] [facility], [facility]   Phone: [phone]  Fax: [phone]    Pt's YOB: 1945    ASSESSMENT   Diagnoses and all orders for this visit:    1. Acute midline low back pain with left-sided sciatica  -     oxyCODONE-acetaminophen (Percocet) 5-325 mg per tablet; Take 1 Tablet by mouth every six (6) hours as needed for Pain for up to 7 days. Max Daily Amount: 4 Tablets. 2. Sacroiliac joint dysfunction  -     SCHEDULE SURGERY    3. Lumbar facet arthropathy    4. Muscle spasm         IMPRESSION AND PLAN:  Sahil Plata is a 78 y.o.  female with history of lumbar pain. Pt complains of continued lumbar pain radiating down the posterior aspect of the left lower extremity with weightbearing on only the left leg. Pt is taking Tylenol #3 intermittently as needed for pain and prednisone 3 mg for polymyalgia rheumatica and expresses interest in steroid injections. 1) Pt was given information on lumbar arthritis exercises. 2) A left sacroiliac injection was ordered for pain relief. 3) Pt was prescribed a short course of Percocet 5-325 mg 1 tab every 6 hours as needed for pain. 4) Pt is not a candidate for NSAID's due to chronic anticoagulation with Eliquis. 5) Ms. Buddy Bailey has a reminder for a \"due or due soon\" health maintenance. I have asked that she contact her primary care provider, Seb Sharpe MD, for follow-up on this health maintenance. 6)  demonstrated consistency with prescribing. 7) May consider new MRI pending response to medication and injection  Follow-up and Dispositions    · Return in about 6 weeks (around 6/16/2022) for Medication follow up Injection follow up. HISTORY OF PRESENT ILLNESS:  Sahil Plata is a 78 y.o. female with history of lumbar pain and presents to the office today for medication follow up.  Pt complains of continued lumbar pain radiating across the left lateral hip and down the posterior aspect of the left lower extremity with weightbearing on only the left leg. Pt reports that her current pain exacerbation began after she underwent a left shoulder replacement since her last office visit. She also admits to difficulty with ambulation due to pain and notes relief with letting the left leg hang down. Pt is taking Tylenol #3 intermittently as needed for pain and prednisone 3 mg for polymyalgia rheumatica and states she took Percocet after her shoulder surgery intermittently as needed. She expresses interest in steroid injections. She notes that she has undergone left shoulder replacement since her last office visit. She also confirms an extensive lumbar fusion in 2021. Pt at this time desires to proceed with medication evaluation and steroid injections. Of note, pt presents to today's office visit ambulating with the assistance of a single point cane. Pain Scale: 5/10    PCP: Hugh Lara MD     Past Medical History:   Diagnosis Date    Arthritis     Hypercholesterolemia     Hypertension     Ill-defined condition     osteopenia    Lower back pain     Migraine     PMR (polymyalgia rheumatica) (HCC)     Thyroid disease         Social History     Socioeconomic History    Marital status:      Spouse name: Not on file    Number of children: Not on file    Years of education: Not on file    Highest education level: Not on file   Occupational History    Not on file   Tobacco Use    Smoking status: Former Smoker     Packs/day: 1.00     Years: 5.00     Pack years: 5.00     Quit date: 1970     Years since quittin.3    Smokeless tobacco: Never Used   Substance and Sexual Activity    Alcohol use:  Yes     Alcohol/week: 0.8 standard drinks     Types: 1 Glasses of wine per week     Comment: 4 nights a week    Drug use: No    Sexual activity: Not on file     Comment: Hysterectomy   Other Topics Concern    Not on file   Social History Narrative    Not on file     Social Determinants of Health     Financial Resource Strain:     Difficulty of Paying Living Expenses: Not on file   Food Insecurity:     Worried About Running Out of Food in the Last Year: Not on file    Poornima of Food in the Last Year: Not on file   Transportation Needs:     Lack of Transportation (Medical): Not on file    Lack of Transportation (Non-Medical): Not on file   Physical Activity:     Days of Exercise per Week: Not on file    Minutes of Exercise per Session: Not on file   Stress:     Feeling of Stress : Not on file   Social Connections:     Frequency of Communication with Friends and Family: Not on file    Frequency of Social Gatherings with Friends and Family: Not on file    Attends Jainism Services: Not on file    Active Member of [address] [place] [address] [employer] or Organizations: Not on file    Attends Club or Organization Meetings: Not on file    Marital Status: Not on file   Intimate Partner Violence:     Fear of Current or Ex-Partner: Not on file    Emotionally Abused: Not on file    Physically Abused: Not on file    Sexually Abused: Not on file   Housing Stability:     Unable to Pay for Housing in the Last Year: Not on file    Number of [place] in the Last Year: Not on file    Unstable Housing in the Last Year: Not on file       Current Outpatient Medications   Medication Sig Dispense Refill    oxyCODONE-acetaminophen (Percocet) 5-325 mg per tablet Take 1 Tablet by mouth every six (6) hours as needed for Pain for up to 7 days. Max Daily Amount: 4 Tablets. 28 Tablet 0    acetaminophen (TYLENOL) 500 mg tablet       diazePAM (VALIUM) 10 mg tablet       diclofenac (VOLTAREN) 1 % gel       omeprazole (PRILOSEC) 40 mg capsule TAKE 1 CAPSULE BY MOUTH DAILY 30 MINUTES BEFORE BREAKFAST      diclofenac (Flector) 1.3 % pt12 Apply 1 patch BID to the left shoulder as directed.  60 Patch 2    calcium phosphate-vitamin D3 (Citracal-D3 Gummies) 250 mg-12.5 mcg (500 unit) chew Citracal-D3 Gummies 250 mg-12.5 mcg (500 unit) chewable tablet   Take 2 tablets every day by oral route.  metoprolol tartrate (LOPRESSOR) 25 mg tablet TAKE 1 TABLET TWICE A DAY      lidocaine (LIDODERM) 5 % APPLY PATCH TO THE AFFECTED AREA FOR 12 HOURS A DAY AND REMOVE FOR 12 HOURS A DAY 60 Each 5    thyroid, Pork, (ARMOUR THYROID) 60 mg tablet Take 60 mg by mouth daily. Plus 50 mg qdaily      apixaban (ELIQUIS) 5 mg tablet Take 5 mg by mouth two (2) times a day.  flecainide acetate (FLECAINIDE PO) Take  by mouth two (2) times a day.  predniSONE (DELTASONE) 1 mg tablet Take 3 mg by mouth daily.  DULoxetine (CYMBALTA) 60 mg capsule Take 60 mg by mouth daily.  rosuvastatin (CRESTOR) 20 mg tablet Take 20 mg by mouth nightly.  cycloSPORINE (RESTASIS) 0.05 % ophthalmic emulsion Administer 1 Drop to both eyes two (2) times a day.  benazepril (LOTENSIN) 40 mg tablet Take 40 mg by mouth daily. Allergies   Allergen Reactions    Adhesive Tape-Silicones Other (comments)    Levaquin [Levofloxacin] Nausea and Vomiting         REVIEW OF SYSTEMS    Constitutional: Negative for fever or chills. Positive for unintentional weight gain. Respiratory: Negative for cough or shortness of breath. Cardiovascular: Negative for chest pain or palpitations. Gastrointestinal: Negative for acid reflux, change in bowel habits, or constipation. Genitourinary: Negative for dysuria and flank pain. Musculoskeletal: Positive for lumbar and left lower extremity pain. Skin: Negative for rash. Neurological: Negative for headaches, dizziness, or numbness. Endo/Heme/Allergies: Negative for increased bruising. Psychiatric/Behavioral: Negative for difficulty with sleep.     As per HPI    PHYSICAL EXAMINATION  Visit Vitals  Pulse 96   Temp 97 °F (36.1 °C) (Skin)   Ht 5' 2.5\" (1.588 m)   Wt 202 lb 9.6 oz (91.9 kg)   SpO2 97%   BMI 36.47 kg/m²       Constitutional: Awake, alert, and in no acute distress. Neurological: Sensation to light touch is intact. Skin: warm, dry, and intact. Musculoskeletal: Right lumbar pain with resisted left hip flexion. Tenderness to palpation over the bilateral sacroiliac joints. No pain with extension, axial loading, or forward flexion. Positive HAYDEN test and pain with compression. Limited range of motion with bilateral HAYDEN test. No pain with internal or external rotation of her hips. Negative straight leg raise bilaterally. Patient ambulates with the assistance of a single point cane. Hip Flex  Quads Hamstrings Ankle DF EHL Ankle PF   Right +4/5 +4/5 +4/5 +4/5 +4/5 +4/5   Left +4/5 +4/5 +4/5 +4/5 +4/5 +4/5     IMAGING:    Lumbar spine MRI from 12/20/2021 was personally reviewed with the patient and demonstrated:     FINDINGS:     Bone marrow signal is heterogeneous which is nonspecific but unchanged.     Interval development in multilevel endplate edema most pronounced on T10-L1.     Multilevel disc height loss and endplate osteophyte formation grossly unchanged.     Trace anterolisthesis L4 on L5 stable.     Posterior body fusion extending from L2 to L5 grossly unchanged. Fixation across  the sacroiliac joints is new when compared to prior exam. Blooming artifact from  metallic hardware limits evaluation. Extensive stranding and edema noted within  the posterior soft tissues, unchanged.     Mild dextroscoliosis stable. Vertebral body heights unchanged. L1 hemangioma  unchanged.     Conus terminates at T12-L1 and is otherwise unremarkable.     Bulging disc and osteophyte complex again noted at T9-T10 and T10-T11. Question  slight progression with now mild spinal canal narrowing and mild-to-moderate  neuroforaminal narrowing most pronounced at T10-T11.     T11-T12 demonstrates interval progression of broad-based bulging asymmetric to  the left with progression of neuroforaminal narrowing.  Spinal canal narrowing  grossly unchanged.     T12-L1: No definite spinal canal or neuroforaminal narrowing.     L1-L2: Advanced facet arthropathy again noted. Bilobed bulging again noted. This  appears slightly decreased when compared to prior exam. Lateral recess narrowing  persist with some central canal narrowing grossly similar to mildly improved. Grossly similar neuroforaminal narrowing. Exam limited due to blooming artifact.     L2-L3: Ligamentum flavum hypertrophy and facet arthropathy again noted. Disc  osteophyte again noted. Question slight progression in spinal canal narrowing. Question slight progression in neuroforaminal narrowing.     L3-L4: Spinal canal is decompressed disc osteophyte complex again noted. Probable persistent right neuroforaminal narrowing. Left neuroforamen grossly  similar.     L4-L5: Spinal canal is decompressed. Disc osteophyte complex again noted. Neuroforaminal narrowing stable.     L5-S1: Grossly similar facet arthropathy. Disc bulge unchanged. No definite  spinal canal narrowing. Neuroforaminal narrowing grossly unchanged.        IMPRESSION     Interval sacroiliac joint fixation.     Posterior body fixation lumbar spine, unchanged which limits evaluation.     Interval development of marked endplate edema within the lower thoracic and  upper lumbar spine.     Interval progression in overall multilevel degenerative findings causing various  degrees of spinal canal and neuroforaminal narrowing.     Lumbar spine x-rays from 10/28/2021 were personally reviewed with the patient and demonstrated:     FINDINGS:     Similar levoscoliosis. Bilateral sacroiliac joint fusion. Posterior fusion with pedicle screws and rods from L2 to L5. Hardware appears intact. Marked disc height loss at L1/L2, L2/L3 and L5/S1 with endplate sclerosis and osteophytes. Moderate disc height loss from L3 to L5. Minimal grade 1 anterolisthesis of L4 is similar.     Moderate bilateral facet joint arthropathy. Atherosclerotic vascular calcifications.    IMPRESSION     1. Fusion L2-L5 with intact hardware. Similar grade 1 anterolisthesis of L4.   2. Lumbar spondylosis as above.            Lumbar CT from 9/14/2020 was personally reviewed and demonstrated:  FINDINGS:     The imaged lung bases are clear. No retroperitoneal lymphadenopathy. Imaged portions of the kidneys, spleen, adrenal glands, and liver are normal.    Spinal cord terminates at the L1 level. Posterior fusion spanning L2-L5 with bilateral pedicle screws, interlocking vertical bars and multilevel laminotomies. No perihardware lucency to suggest loosening or infection. The left L5 pedicle screw is with its tip just outside the anterior cortex of L5. No fluid collection within the surgical bed. Vertebral body heights are preserved. At T10-T11, there is a mild broad-based disc osteophyte complex without high-grade central canal or foraminal narrowing. At T11-T12, there is a mild broad-based disc osteophyte complex without high-grade central canal or foraminal narrowing. At T12-L1, there is a mild broad-based disc osteophyte complex without high-grade central canal or foraminal narrowing. L1-L2: There is disc desiccation with vacuum disc phenomenon and moderate intervertebral disc space narrowing. A mild broad-based disc bulge with mild bilateral foraminal narrowing. Mild bilateral facet arthropathy. Mild central canal narrowing at this level. L2-L3: There is broad-based disc osteophyte complex superimposed on mild/moderate facet arthropathy. Mild central canal narrowing. Mild bilateral foraminal narrowing. .      L3-L4: No high-grade central canal or foraminal narrowing.      L4-L5: There is grade 1 anterolisthesis of L4 with respect L5 with slight distortion of the thecal sac , without high-grade central canal narrowing. Mild/moderate right foraminal narrowing. Mild left foraminal narrowing. .      L5-S1: There is disc desiccation, vacuum disc phenomenon and moderate intervertebral disc space narrowing. Moderate/severe left facet arthropathy with synovial air. Mild/moderate right facet arthropathy. Asymmetric left moderate foraminal narrowing. Mild/moderate right foraminal narrowing. Small focus of air related to right facet arthropathy is noted within the right subarticular recess. Mild central canal narrowing at this level. IMPRESSION    Posterior fusion spanning L2-L5 without evidence of hardware complication. Disproportionate discogenic degenerative changes at L1-L2 and L5-S1. Foraminal narrowing is most advanced at L5-S1. Facet arthropathy is most advanced on the left at L5-S1. No high-grade central canal narrowing throughout the lumbar spinal column. Please refer to the body of the report for a comprehensive segmental analysis of the lumbar spinal column.     Pelvic inlet/outlet x-rays from 10/28/2021 was personally reviewed and demonstrated:      FINDINGS: Bilateral sacroiliac iliac joint effusion. Sacroiliac joints appear intact. Hardware appears intact. Lower lumbar spine fusion hardware. IMPRESSION     1. Bilateral sacroiliac joint fusion          Cervical spine 2V x-rays from 7/27/2020 were personally reviewed and demonstrated:  Diffuse degenerative changes with listhesis at C2-3.        Written by Theodore Pena, as dictated by Arvin Dunham MD.  I, Dr. Arvin Dunham confirm that all documentation is accurate.

## 2022-05-05 NOTE — PATIENT INSTRUCTIONS
Low Back Arthritis: Exercises  Introduction  Here are some examples of typical rehabilitation exercises for your condition. Start each exercise slowly. Ease off the exercise if you start to have pain. Your doctor or physical therapist will tell you when you can start these exercises and which ones will work best for you. When you are not being active, find a comfortable position for rest. Some people are comfortable on the floor or a medium-firm bed with a small pillow under their head and another under their knees. Some people prefer to lie on their side with a pillow between their knees. Don't stay in one position for too long. Take short walks (10 to 20 minutes) every 2 to 3 hours. Avoid slopes, hills, and stairs until you feel better. Walk only distances you can manage without pain, especially leg pain. How to do the exercises  Pelvic tilt    1. Lie on your back with your knees bent. 2. \"Brace\" your stomach--tighten your muscles by pulling in and imagining your belly button moving toward your spine. 3. Press your lower back into the floor. You should feel your hips and pelvis rock back. 4. Hold for 6 seconds while breathing smoothly. 5. Relax and allow your pelvis and hips to rock forward. 6. Repeat 8 to 12 times. Back stretches    1. Get down on your hands and knees on the floor. 2. Relax your head and allow it to droop. Round your back up toward the ceiling until you feel a nice stretch in your upper, middle, and lower back. Hold this stretch for as long as it feels comfortable, or about 15 to 30 seconds. 3. Return to the starting position with a flat back while you are on your hands and knees. 4. Let your back sway by pressing your stomach toward the floor. Lift your buttocks toward the ceiling. 5. Hold this position for 15 to 30 seconds. 6. Repeat 2 to 4 times. Follow-up care is a key part of your treatment and safety.  Be sure to make and go to all appointments, and call your doctor if you are having problems. It's also a good idea to know your test results and keep a list of the medicines you take. Where can you learn more? Go to http://geovanna-tish.info/  Enter T094 in the search box to learn more about \"Low Back Arthritis: Exercises. \"  Current as of: July 1, 2021               Content Version: 13.2  © 2006-2022 [employer] ([employer] Care instructions adapted under license by [employer] (which disclaims liability or warranty for this information). If you have questions about a medical condition or this instruction, always ask your healthcare professional. [place] [address] any warranty or liability for your use of this information.

## 2022-05-06 RX ORDER — OXYCODONE AND ACETAMINOPHEN 5; 325 MG/1; MG/1
1 TABLET ORAL
Qty: 28 TABLET | Refills: 0 | Status: SHIPPED | OUTPATIENT
Start: 2022-05-06 | End: 2022-06-09 | Stop reason: SDUPTHER

## 2022-05-16 ENCOUNTER — TELEPHONE (OUTPATIENT)
Dept: ORTHOPEDIC SURGERY | Age: 77
End: 2022-05-16

## 2022-05-16 NOTE — TELEPHONE ENCOUNTER
[facility] finally called re clearing patient to stop her Eilquis prior to spinal injection  sched for 5/18. Patient is a former patient of Dr Rigoberto Petersen who retired, and patient has not been seen by them since march of 2021. She does have an upcoming visit with them on 5/22. This call today finally came after multiple calls and faxes to their office. I guess they got tired of me calling and finally let me know. Patient already stopped her Eliquis yesterday. I called her to advise of all of this and suggested her restarting her Eliquis . She asked if she could still have the injection while on Eliquis, and I told her that she has to be cleared by her Cardiologist and cannot have an injection while on a blood thinner.

## 2022-05-31 NOTE — TELEPHONE ENCOUNTER
, Salina Morales MD  [facility] 1 hour ago (8:12 AM)     TJ    I had been told at the office that she had not been cleared by cardiology and that the injection needed to be scheduled later -- It did not appear things were done in time for the injection --    Message text      Selene Courser routed conversation to Jared Guillen MD

## 2022-06-09 ENCOUNTER — OFFICE VISIT (OUTPATIENT)
Dept: ORTHOPEDIC SURGERY | Age: 77
End: 2022-06-09
Payer: MEDICARE

## 2022-06-09 VITALS
BODY MASS INDEX: 35.22 KG/M2 | WEIGHT: 198.8 LBS | HEART RATE: 88 BPM | RESPIRATION RATE: 16 BRPM | OXYGEN SATURATION: 98 % | HEIGHT: 63 IN | TEMPERATURE: 98.3 F

## 2022-06-09 DIAGNOSIS — M54.42 ACUTE MIDLINE LOW BACK PAIN WITH LEFT-SIDED SCIATICA: Primary | ICD-10-CM

## 2022-06-09 DIAGNOSIS — M47.816 LUMBAR FACET ARTHROPATHY: ICD-10-CM

## 2022-06-09 DIAGNOSIS — M62.838 MUSCLE SPASM: ICD-10-CM

## 2022-06-09 DIAGNOSIS — M53.3 SACROILIAC JOINT DYSFUNCTION: ICD-10-CM

## 2022-06-09 DIAGNOSIS — M35.3 PMR (POLYMYALGIA RHEUMATICA) (HCC): ICD-10-CM

## 2022-06-09 DIAGNOSIS — Z98.1 HISTORY OF LUMBAR FUSION: ICD-10-CM

## 2022-06-09 PROCEDURE — G8427 DOCREV CUR MEDS BY ELIG CLIN: HCPCS | Performed by: PHYSICAL MEDICINE & REHABILITATION

## 2022-06-09 PROCEDURE — 1090F PRES/ABSN URINE INCON ASSESS: CPT | Performed by: PHYSICAL MEDICINE & REHABILITATION

## 2022-06-09 PROCEDURE — G8510 SCR DEP NEG, NO PLAN REQD: HCPCS | Performed by: PHYSICAL MEDICINE & REHABILITATION

## 2022-06-09 PROCEDURE — G8756 NO BP MEASURE DOC: HCPCS | Performed by: PHYSICAL MEDICINE & REHABILITATION

## 2022-06-09 PROCEDURE — 99213 OFFICE O/P EST LOW 20 MIN: CPT | Performed by: PHYSICAL MEDICINE & REHABILITATION

## 2022-06-09 PROCEDURE — G8536 NO DOC ELDER MAL SCRN: HCPCS | Performed by: PHYSICAL MEDICINE & REHABILITATION

## 2022-06-09 PROCEDURE — 1123F ACP DISCUSS/DSCN MKR DOCD: CPT | Performed by: PHYSICAL MEDICINE & REHABILITATION

## 2022-06-09 PROCEDURE — G8417 CALC BMI ABV UP PARAM F/U: HCPCS | Performed by: PHYSICAL MEDICINE & REHABILITATION

## 2022-06-09 PROCEDURE — 3017F COLORECTAL CA SCREEN DOC REV: CPT | Performed by: PHYSICAL MEDICINE & REHABILITATION

## 2022-06-09 PROCEDURE — G8400 PT W/DXA NO RESULTS DOC: HCPCS | Performed by: PHYSICAL MEDICINE & REHABILITATION

## 2022-06-09 PROCEDURE — 1101F PT FALLS ASSESS-DOCD LE1/YR: CPT | Performed by: PHYSICAL MEDICINE & REHABILITATION

## 2022-06-09 RX ORDER — OXYCODONE AND ACETAMINOPHEN 5; 325 MG/1; MG/1
1 TABLET ORAL
Qty: 28 TABLET | Refills: 0 | Status: SHIPPED | OUTPATIENT
Start: 2022-06-09 | End: 2022-06-16

## 2022-06-09 NOTE — PROGRESS NOTES
[facility]  [address]lyn [address] [place], [facility] [address]  Phone: [phone]  Fax: [phone]    Pt's YOB: 1945    ASSESSMENT   Diagnoses and all orders for this visit:    1. Acute midline low back pain with left-sided sciatica  -     oxyCODONE-acetaminophen (Percocet) 5-325 mg per tablet; Take 1 Tablet by mouth every six (6) hours as needed for Pain for up to 7 days. Max Daily Amount: 4 Tablets. 2. Sacroiliac joint dysfunction    3. Muscle spasm    4. Lumbar facet arthropathy    5. PMR (polymyalgia rheumatica) (HCC)    6. History of lumbar fusion         IMPRESSION AND PLAN:  Pt is 77 yo female with recent acute lumbar pain that has improved somewhat and is currently taking oxycodone as needed for pain; she has had previous lumbar fusion and has had chronic SI joint pain intermittently which have responded well overall to SI injections    1) Pt was given a limited refill of the oxycodone for her acute pain. 2) Pt is not a candidate for NSAIDs due to use of Eliquis  3) May consider lumbar / SI injections if pain worsens/ persists. 4) Ms. Dolores Burks has a reminder for a \"due or due soon\" health maintenance. I have asked that she contact her primary care provider, Omega Harrington MD, for follow-up on this health maintenance. 5)  demonstrated consistency with prescribing. 6) Aqua exercise encouraged  Follow-up and Dispositions    · Return in about 3 months (around 9/9/2022) for Medication follow up. HISTORY OF PRESENT ILLNESS:  Shaheed Will is a 78 y.o.  female with history of lumbar and SI pain and presents to the office today for follow up. She currently rates her pain as 3/10 and describes it as constant and dull. She denies any recent injury and was to have an SI joint injection after her last office visit.  She did not receive clearance from her cardiologist about stopping the Eliquis temporarily and therefore, was not able to have the injection. She states her previous cardiologist had retired and she had not been seen by her new physician prior to the injection. She was able to go on her trip to visit family but was quite disappointed she could not receive the injection prior to traveling. She did find the oxycodone to be helpful for her pain and tries to use it sparingly as needed. Overall her pain is at a tolerable level but she still finds she needs the pain medication at times and requests a refill. Of note pt has had previous lumbar fusion and following this chronic SI joint pain. She has had SI injections intermittently and has had SI joint fusions in the past few years. Pain Scale: 3/10    PCP: Elgin Nash MD     Past Medical History:   Diagnosis Date    Arthritis     Hypercholesterolemia     Hypertension     Ill-defined condition     osteopenia    Lower back pain     Migraine     PMR [facility]polym[facility]algia rheumatica) ([facility])     Thyroid disease         Social History     Socioeconomic History    Marital status:      Spouse name: Not on file    Number of children: Not on file    Years of education: Not on file    Highest education level: Not on file   Occupational History    Not on file   Tobacco Use    Smoking status: Former Smoker     Packs/day: 1.00     Years: 5.00     Pack years: 5.00     Quit date: 1970     Years since quittin.5    Smokeless tobacco: Never Used   Vaping Use    Vaping Use: Never used   Substance and Sexual Activity    Alcohol use:  Yes     Alcohol/week: 0.8 standard drinks     Types: 1 Glasses of wine per week     Comment: 4 nights a week    Drug use: No    Sexual activity: Not on file     Comment: Hysterectomy   Other Topics Concern    Not on file   Social History Narrative    Not on file     Social Determinants of Health     Financial Resource Strain:     Difficulty of Paying Living Expenses: Not on file   Food Insecurity:     Worried About Running Out of Food in the Last Year: Not on file    Ran Out of Food in the Last Year: Not on file   Transportation Needs:     Lack of Transportation (Medical): Not on file    Lack of Transportation (Non-Medical): Not on file   Physical Activity:     Days of Exercise per Week: Not on file    Minutes of Exercise per Session: Not on file   Stress:     Feeling of Stress : Not on file   Social Connections:     Frequency of Communication with Friends and Family: Not on file    Frequency of Social Gatherings with Friends and Family: Not on file    Attends Yarsanism Services: Not on file    Active Member of [address] [place] [address] [employer] or Organizations: Not on file    Attends Club or Organization Meetings: Not on file    Marital Status: Not on file   Intimate Partner Violence:     Fear of Current or Ex-Partner: Not on file    Emotionally Abused: Not on file    Physically Abused: Not on file    Sexually Abused: Not on file   Housing Stability:     Unable to Pay for Housing in the Last Year: Not on file    Number of [place] in the Last Year: Not on file    Unstable Housing in the Last Year: Not on file       Current Outpatient Medications   Medication Sig Dispense Refill    acetaminophen (TYLENOL) 500 mg tablet       diazePAM (VALIUM) 10 mg tablet       diclofenac (VOLTAREN) 1 % gel       omeprazole (PRILOSEC) 40 mg capsule TAKE 1 CAPSULE BY MOUTH DAILY 30 MINUTES BEFORE BREAKFAST      diclofenac (Flector) 1.3 % pt12 Apply 1 patch BID to the left shoulder as directed. 60 Patch 2    calcium phosphate-vitamin D3 (Citracal-D3 Gummies) 250 mg-12.5 mcg (500 unit) chew Citracal-D3 Gummies 250 mg-12.5 mcg (500 unit) chewable tablet   Take 2 tablets every day by oral route.       metoprolol tartrate (LOPRESSOR) 25 mg tablet TAKE 1 TABLET TWICE A DAY      lidocaine (LIDODERM) 5 % APPLY PATCH TO THE AFFECTED AREA FOR 12 HOURS A DAY AND REMOVE FOR 12 HOURS A DAY 60 Each 5    thyroid, Pork, (ARMOUR THYROID) 60 mg tablet Take 60 mg by mouth daily. Plus 50 mg qdaily      apixaban (ELIQUIS) 5 mg tablet Take 5 mg by mouth two (2) times a day.  flecainide acetate (FLECAINIDE PO) Take  by mouth two (2) times a day.  predniSONE (DELTASONE) 1 mg tablet Take 3 mg by mouth daily.  DULoxetine (CYMBALTA) 60 mg capsule Take 60 mg by mouth daily.  rosuvastatin (CRESTOR) 20 mg tablet Take 20 mg by mouth nightly.  cycloSPORINE (RESTASIS) 0.05 % ophthalmic emulsion Administer 1 Drop to both eyes two (2) times a day.  benazepril (LOTENSIN) 40 mg tablet Take 40 mg by mouth daily. Allergies   Allergen Reactions    Adhesive Tape-Silicones Other (comments)    Levaquin [Levofloxacin] Nausea and Vomiting         REVIEW OF SYSTEMS    Constitutional: Negative for fever, chills, or weight change. Respiratory: Negative for cough or shortness of breath. Cardiovascular: Negative for chest pain or palpitations. Gastrointestinal: Negative for acid reflux, change in bowel habits, or constipation. Genitourinary: Negative for dysuria and flank pain. Musculoskeletal: Positive for lumbar pain. Skin: Negative for rash. Neurological: Negative for headaches, dizziness, or numbness. Endo/Heme/Allergies: Negative for increased bruising. Psychiatric/Behavioral: Negative for difficulty with sleep. As per HPI    PHYSICAL EXAMINATION  Visit Vitals  Pulse 88   Temp 98.3 °F (36.8 °C) (Temporal)   Resp 16   Ht 5' 2.5\" (1.588 m)   Wt 198 lb 12.8 oz (90.2 kg)   SpO2 98%   BMI 35.78 kg/m²       Constitutional: Awake, alert, and in no acute distress. Neurological: 1+ symmetrical DTRs in the upper extremities. 1+ symmetrical DTRs in the lower extremities. Sensation to light touch is intact. Negative Hart's sign bilaterally. Skin: warm, dry, and intact. Musculoskeletal: Mild pain with extension, axial loading, less with  forward flexion. No pain with internal or external rotation of her hips. Negative straight leg raise bilaterally. Biceps  Triceps Deltoids Wrist Ext Wrist Flex Hand Intrin   Right +4/5 +4/5 +4/5 +4/5 +4/5 +4/5   Left +4/5 +4/5 +4/5 +4/5 +4/5 +4/5      Hip Flex  Quads Hamstrings Ankle DF EHL Ankle PF   Right +4/5 +4/5 +4/5 +4/5 +4/5 +4/5   Left +4/5 +4/5 +4/5 +4/5 +4/5 +4/5        IMAGING:     Lumbar spine MRI from 12/20/2021 was personally reviewed with the patient and demonstrated:     FINDINGS:     Bone marrow signal is heterogeneous which is nonspecific but unchanged.     Interval development in multilevel endplate edema most pronounced on T10-L1.     Multilevel disc height loss and endplate osteophyte formation grossly unchanged.     Trace anterolisthesis L4 on L5 stable.     Posterior body fusion extending from L2 to L5 grossly unchanged. Fixation across  the sacroiliac joints is new when compared to prior exam. Blooming artifact from  metallic hardware limits evaluation. Extensive stranding and edema noted within  the posterior soft tissues, unchanged.     Mild dextroscoliosis stable. Vertebral body heights unchanged. L1 hemangioma  unchanged.     Conus terminates at T12-L1 and is otherwise unremarkable.     Bulging disc and osteophyte complex again noted at T9-T10 and T10-T11. Question  slight progression with now mild spinal canal narrowing and mild-to-moderate  neuroforaminal narrowing most pronounced at T10-T11.     T11-T12 demonstrates interval progression of broad-based bulging asymmetric to  the left with progression of neuroforaminal narrowing. Spinal canal narrowing  grossly unchanged.     T12-L1: No definite spinal canal or neuroforaminal narrowing.     L1-L2: Advanced facet arthropathy again noted. Bilobed bulging again noted. This  appears slightly decreased when compared to prior exam. Lateral recess narrowing  persist with some central canal narrowing grossly similar to mildly improved. Grossly similar neuroforaminal narrowing.  Exam limited due to blooming artifact.     L2-L3: Ligamentum flavum hypertrophy and facet arthropathy again noted. Disc  osteophyte again noted. Question slight progression in spinal canal narrowing. Question slight progression in neuroforaminal narrowing.     L3-L4: Spinal canal is decompressed disc osteophyte complex again noted. Probable persistent right neuroforaminal narrowing. Left neuroforamen grossly  similar.     L4-L5: Spinal canal is decompressed. Disc osteophyte complex again noted. Neuroforaminal narrowing stable.     L5-S1: Grossly similar facet arthropathy. Disc bulge unchanged. No definite  spinal canal narrowing. Neuroforaminal narrowing grossly unchanged.        IMPRESSION     Interval sacroiliac joint fixation.     Posterior body fixation lumbar spine, unchanged which limits evaluation.     Interval development of marked endplate edema within the lower thoracic and  upper lumbar spine.     Interval progression in overall multilevel degenerative findings causing various  degrees of spinal canal and neuroforaminal narrowing.     Lumbar spine x-rays from 10/28/2021 were personally reviewed with the patient and demonstrated:     FINDINGS:     Similar levoscoliosis. Bilateral sacroiliac joint fusion. Posterior fusion with pedicle screws and rods from L2 to L5. Hardware appears intact. Marked disc height loss at L1/L2, L2/L3 and L5/S1 with endplate sclerosis and osteophytes. Moderate disc height loss from L3 to L5. Minimal grade 1 anterolisthesis of L4 is similar.     Moderate bilateral facet joint arthropathy. Atherosclerotic vascular calcifications. IMPRESSION     1. Fusion L2-L5 with intact hardware. Similar grade 1 anterolisthesis of L4.   2. Lumbar spondylosis as above.            Lumbar CT from 9/14/2020 was personally reviewed and demonstrated:  FINDINGS:     The imaged lung bases are clear. No retroperitoneal lymphadenopathy. Imaged portions of the kidneys, spleen, adrenal glands, and liver are normal.    Spinal cord terminates at the L1 level.     Posterior fusion spanning L2-L5 with bilateral pedicle screws, interlocking vertical bars and multilevel laminotomies. No perihardware lucency to suggest loosening or infection. The left L5 pedicle screw is with its tip just outside the anterior cortex of L5. No fluid collection within the surgical bed. Vertebral body heights are preserved. At T10-T11, there is a mild broad-based disc osteophyte complex without high-grade central canal or foraminal narrowing. At T11-T12, there is a mild broad-based disc osteophyte complex without high-grade central canal or foraminal narrowing. At T12-L1, there is a mild broad-based disc osteophyte complex without high-grade central canal or foraminal narrowing. L1-L2: There is disc desiccation with vacuum disc phenomenon and moderate intervertebral disc space narrowing. A mild broad-based disc bulge with mild bilateral foraminal narrowing. Mild bilateral facet arthropathy. Mild central canal narrowing at this level. L2-L3: There is broad-based disc osteophyte complex superimposed on mild/moderate facet arthropathy. Mild central canal narrowing. Mild bilateral foraminal narrowing. .      L3-L4: No high-grade central canal or foraminal narrowing.      L4-L5: There is grade 1 anterolisthesis of L4 with respect L5 with slight distortion of the thecal sac , without high-grade central canal narrowing. Mild/moderate right foraminal narrowing. Mild left foraminal narrowing. .      L5-S1: There is disc desiccation, vacuum disc phenomenon and moderate intervertebral disc space narrowing. Moderate/severe left facet arthropathy with synovial air. Mild/moderate right facet arthropathy. Asymmetric left moderate foraminal narrowing. Mild/moderate right foraminal narrowing. Small focus of air related to right facet arthropathy is noted within the right subarticular recess. Mild central canal narrowing at this level.      IMPRESSION    Posterior fusion spanning L2-L5 without evidence of hardware complication. Disproportionate discogenic degenerative changes at L1-L2 and L5-S1. Foraminal narrowing is most advanced at L5-S1. Facet arthropathy is most advanced on the left at L5-S1. No high-grade central canal narrowing throughout the lumbar spinal column. Please refer to the body of the report for a comprehensive segmental analysis of the lumbar spinal column.     Pelvic inlet/outlet x-rays from 10/28/2021 was personally reviewed and demonstrated:      FINDINGS: Bilateral sacroiliac iliac joint effusion. Sacroiliac joints appear intact. Hardware appears intact. Lower lumbar spine fusion hardware. IMPRESSION     1.  Bilateral sacroiliac joint fusion          Cervical spine 2V x-rays from 7/27/2020 were personally reviewed and demonstrated:  Diffuse degenerative changes with listhesis at C2-3.

## 2022-06-09 NOTE — LETTER
7/4/2022    Patient: Marc Wisdom   YOB: 1945   Date of Visit: 6/9/2022     Caridad Meigs, MD  [address] [place] [phone]  Via Fax: [phone]    Dear Caridad Meigs, MD,      Thank you for referring Ms. Dotty Olivarez to [address] for evaluation. My notes for this consultation are attached. If you have questions, please do not hesitate to call me. I look forward to following your patient along with you.       Sincerely,    Sean Hendrix MD

## 2022-07-06 ENCOUNTER — HOSPITAL ENCOUNTER (OUTPATIENT)
Age: 77
Setting detail: OUTPATIENT SURGERY
Discharge: HOME | End: 2022-07-06
Attending: PHYSICAL MEDICINE & REHABILITATION | Admitting: PHYSICAL MEDICINE & REHABILITATION
Payer: MEDICARE

## 2022-07-06 ENCOUNTER — APPOINTMENT (OUTPATIENT)
Dept: GENERAL RADIOLOGY | Age: 77
End: 2022-07-06
Attending: PHYSICAL MEDICINE & REHABILITATION
Payer: MEDICARE

## 2022-07-06 VITALS
SYSTOLIC BLOOD PRESSURE: 129 MMHG | OXYGEN SATURATION: 98 % | TEMPERATURE: 98.2 F | RESPIRATION RATE: 16 BRPM | HEART RATE: 89 BPM | DIASTOLIC BLOOD PRESSURE: 72 MMHG

## 2022-07-06 DIAGNOSIS — M53.3 SACROILIAC JOINT DYSFUNCTION: ICD-10-CM

## 2022-07-06 PROCEDURE — 2709999900 HC NON-CHARGEABLE SUPPLY: Performed by: PHYSICAL MEDICINE & REHABILITATION

## 2022-07-06 PROCEDURE — 27096 INJECT SACROILIAC JOINT: CPT | Performed by: PHYSICAL MEDICINE & REHABILITATION

## 2022-07-06 PROCEDURE — 77030039433 HC TY MYLEOGRAM BD -B: Performed by: PHYSICAL MEDICINE & REHABILITATION

## 2022-07-06 PROCEDURE — 74011000250 HC RX REV CODE- 250: Performed by: PHYSICAL MEDICINE & REHABILITATION

## 2022-07-06 PROCEDURE — 74011250636 HC RX REV CODE- 250/636: Performed by: PHYSICAL MEDICINE & REHABILITATION

## 2022-07-06 PROCEDURE — 77030003676 HC NDL SPN MPRI -A: Performed by: PHYSICAL MEDICINE & REHABILITATION

## 2022-07-06 PROCEDURE — 76010000009 HC PAIN MGT 0 TO 30 MIN PROC: Performed by: PHYSICAL MEDICINE & REHABILITATION

## 2022-07-06 PROCEDURE — 74011000636 HC RX REV CODE- 636: Performed by: PHYSICAL MEDICINE & REHABILITATION

## 2022-07-06 RX ORDER — LIDOCAINE HYDROCHLORIDE 10 MG/ML
INJECTION, SOLUTION EPIDURAL; INFILTRATION; INTRACAUDAL; PERINEURAL AS NEEDED
Status: DISCONTINUED | OUTPATIENT
Start: 2022-07-06 | End: 2022-07-06 | Stop reason: HOSPADM

## 2022-07-06 RX ORDER — DIAZEPAM 5 MG/1
5-20 TABLET ORAL ONCE
Status: DISCONTINUED | OUTPATIENT
Start: 2022-07-06 | End: 2022-07-06 | Stop reason: HOSPADM

## 2022-07-06 RX ORDER — DEXAMETHASONE SODIUM PHOSPHATE 100 MG/10ML
INJECTION INTRAMUSCULAR; INTRAVENOUS AS NEEDED
Status: DISCONTINUED | OUTPATIENT
Start: 2022-07-06 | End: 2022-07-06 | Stop reason: HOSPADM

## 2022-07-06 NOTE — H&P
Date of Surgery Update:  Shaheed Will was seen and examined. History and physical has been reviewed. The patient has been examined. There have been no significant clinical changes since the completion of the last office visit. The patient was counseled at length about the risks of ruby Covid-19 during their perioperative period and any recovery window from their procedure. The patient was made aware that ruby Covid-19  may worsen their prognosis for recovering from their procedure and lend to a higher morbidity and/or mortality risk. All material risks, benefits, and reasonable alternatives including postponing the procedure were discussed. The patient does  wish to proceed with the procedure at this time.     Pre Injection pain level:    5/10  Post Injection pain level:        0/10      Signed By: Loy Matta MD     July 6, 2022 12:56 PM

## 2022-07-06 NOTE — PROCEDURES
Procedure Note    Patient Name: Wilfredo Win    Date of Procedure: July 6, 2022    Preoperative Diagnosis: Sacroiliac Joint Dysfunction    Post Operative Diagnosis: same    Procedure: SI Joint Injection left     Consent: Informed consent was obtained prior to the procedure. The patient was given the opportunity to ask questions regarding the procedure and its associated risks. In addition to the potential risks associated with the procedure itself, the patient was informed both verbally and in writing of potential side effects of the use of glucocorticoids. The patient appeared to comprehend the informed consent and desired to have the procedure performed. Procedure: The patient was placed in the prone position on the flouroscopy table and the back was prepped and draped in the usual sterile manner. A #22 gauge spinal needle was then advanced to lie within the SI joint after local Lidocaine 1% injection. 1 cc isovue used to confirm the position. A total of 10 mg of preservative free dexamethasone and 5 cc of Lidocaine was introduced into and around the SI joint. The injection area was cleaned and bandaids applied. No excessive bleeding was noted. Patient dressed and was discharged to home with instructions. Discussion: The patient tolerated the procedure well.      Walter Werner MD  July 6, 2022

## 2022-09-08 ENCOUNTER — OFFICE VISIT (OUTPATIENT)
Dept: ORTHOPEDIC SURGERY | Age: 77
End: 2022-09-08
Payer: MEDICARE

## 2022-09-08 VITALS
BODY MASS INDEX: 34.23 KG/M2 | OXYGEN SATURATION: 99 % | WEIGHT: 193.2 LBS | SYSTOLIC BLOOD PRESSURE: 118 MMHG | HEART RATE: 79 BPM | RESPIRATION RATE: 16 BRPM | TEMPERATURE: 97.7 F | DIASTOLIC BLOOD PRESSURE: 58 MMHG | HEIGHT: 63 IN

## 2022-09-08 DIAGNOSIS — Z98.1 HISTORY OF LUMBAR FUSION: ICD-10-CM

## 2022-09-08 DIAGNOSIS — M35.3 PMR (POLYMYALGIA RHEUMATICA) (HCC): ICD-10-CM

## 2022-09-08 DIAGNOSIS — M48.061 SPINAL STENOSIS OF LUMBAR REGION WITHOUT NEUROGENIC CLAUDICATION: ICD-10-CM

## 2022-09-08 DIAGNOSIS — Z91.81 HISTORY OF FALL: ICD-10-CM

## 2022-09-08 DIAGNOSIS — R29.898 WEAKNESS OF BOTH LEGS: ICD-10-CM

## 2022-09-08 DIAGNOSIS — M62.838 MUSCLE SPASM: ICD-10-CM

## 2022-09-08 DIAGNOSIS — M53.3 SACROILIAC JOINT DYSFUNCTION: Primary | ICD-10-CM

## 2022-09-08 DIAGNOSIS — Z79.01 CHRONIC ANTICOAGULATION: ICD-10-CM

## 2022-09-08 PROCEDURE — G8752 SYS BP LESS 140: HCPCS | Performed by: PHYSICAL MEDICINE & REHABILITATION

## 2022-09-08 PROCEDURE — 1123F ACP DISCUSS/DSCN MKR DOCD: CPT | Performed by: PHYSICAL MEDICINE & REHABILITATION

## 2022-09-08 PROCEDURE — G8432 DEP SCR NOT DOC, RNG: HCPCS | Performed by: PHYSICAL MEDICINE & REHABILITATION

## 2022-09-08 PROCEDURE — G8754 DIAS BP LESS 90: HCPCS | Performed by: PHYSICAL MEDICINE & REHABILITATION

## 2022-09-08 PROCEDURE — G8417 CALC BMI ABV UP PARAM F/U: HCPCS | Performed by: PHYSICAL MEDICINE & REHABILITATION

## 2022-09-08 PROCEDURE — 1090F PRES/ABSN URINE INCON ASSESS: CPT | Performed by: PHYSICAL MEDICINE & REHABILITATION

## 2022-09-08 PROCEDURE — G8536 NO DOC ELDER MAL SCRN: HCPCS | Performed by: PHYSICAL MEDICINE & REHABILITATION

## 2022-09-08 PROCEDURE — G8400 PT W/DXA NO RESULTS DOC: HCPCS | Performed by: PHYSICAL MEDICINE & REHABILITATION

## 2022-09-08 PROCEDURE — 1101F PT FALLS ASSESS-DOCD LE1/YR: CPT | Performed by: PHYSICAL MEDICINE & REHABILITATION

## 2022-09-08 PROCEDURE — G8427 DOCREV CUR MEDS BY ELIG CLIN: HCPCS | Performed by: PHYSICAL MEDICINE & REHABILITATION

## 2022-09-08 PROCEDURE — 3017F COLORECTAL CA SCREEN DOC REV: CPT | Performed by: PHYSICAL MEDICINE & REHABILITATION

## 2022-09-08 PROCEDURE — 99213 OFFICE O/P EST LOW 20 MIN: CPT | Performed by: PHYSICAL MEDICINE & REHABILITATION

## 2022-09-08 RX ORDER — NYSTATIN 100000 [USP'U]/G
POWDER TOPICAL
COMMUNITY
Start: 2022-06-22

## 2022-09-08 NOTE — LETTER
9/9/2022    Patient: Logan Mesa   YOB: 1945   Date of Visit: 9/8/2022     Shelley Sharma MD  [address] [place] [phone]  Via Fax: [phone]    Dear Shelley Sharma MD,      Thank you for referring Ms. Erlinda Cobos to [facility] [address] for evaluation. My notes for this consultation are attached. If you have questions, please do not hesitate to call me. I look forward to following your patient along with you.       Sincerely,    Preeti Rubin MD

## 2022-09-08 NOTE — PROGRESS NOTES
[facility]  [address] [facility], [facility]   Phone: [phone]  Fax: [phone]    Pt's YOB: 1945    ASSESSMENT   Diagnoses and all orders for this visit:    1. Sacroiliac joint dysfunction  -     REFERRAL TO PHYSICAL THERAPY    2. Muscle spasm    3. Weakness of both legs  -     REFERRAL TO PHYSICAL THERAPY    4. History of lumbar fusion  -     REFERRAL TO PHYSICAL THERAPY    5. Spinal stenosis of lumbar region without neurogenic claudication  -     REFERRAL TO PHYSICAL THERAPY    6. Chronic anticoagulation    7. History of fall  -     REFERRAL TO PHYSICAL THERAPY    8. PMR (polymyalgia rheumatica) (HCC)       IMPRESSION AND PLAN:  Jordan Faria is a 78 y.o. female with history of lumbar and sacroiliac pain and presents to the office today for follow up. Pt complains of continued lumbar pain radiating across the left lateral hip and down the posterior aspect of the left lower extremity with weightbearing on only the left leg. She has a HEP of strength training 5 x a week and notes previous SI injection on the left provided significant relief. 1) Pt was given information on lumbar arthritis exercises. 2) She was referred to physical therapy for lumbar pain and leg weakness. 3) Ms. Giorgio Martínez has a reminder for a \"due or due soon\" health maintenance. I have asked that she contact her primary care provider, Emily Gordon MD, for follow-up on this health maintenance. 4)  demonstrated consistency with prescribing. 5) Pt is not a candidate for NSAIDs due to chronic anticoagulation with Eliquis  6) Pt has PMR and remains on low dose prednisone at 3 mg daily  Follow-up and Dispositions    Return in about 2 months (around 11/8/2022) for PT follow up. HISTORY OF PRESENT ILLNESS:  Jordan Faria is a 78 y.o. female with history of lumbar and sacroiliac pain and presents to the office today for follow up.  Pt complains of continued lumbar pain radiating across the left lateral hip and down the posterior aspect of the left lower extremity with weightbearing on only the left leg. She reports recently having two falls within the past three months where she has begun using a single point cane in order to keep herself standing straight. Pt admits the falls are not due to loss of balance they are more due to her bending down to lift something up and then she is not able to get herself back up straight so she falls forwards to get herself out of the episode. She denies issues with getting out of bed or sitting down but does note at a time last year the same episode happened while she was gardening where she felt like she was 'almost top heavy' causing her to fall backwards. Pt admits to rupturing tendons in the knees 10-11 years ago but further denies pain in the legs or prior history of joint replacements. She has a HEP of strength training 5 x a week and notes previous SI injection on the left provided significant relief. Pt at this time desires to proceed with physical therapy. Of note, pt ambulates today with the assistance of a single point cane.      Pain Scale: 3/10    PCP: Dani Prajapati MD     Past Medical History:   Diagnosis Date    Arthritis     Hypercholesterolemia     Hypertension     Ill-defined condition     osteopenia    Lower back pain     Migraine     PMR (polymyalgia rheumatica) (HCC)     Thyroid disease         Social History     Socioeconomic History    Marital status:      Spouse name: Not on file    Number of children: Not on file    Years of education: Not on file    Highest education level: Not on file   Occupational History    Not on file   Tobacco Use    Smoking status: Former     Packs/day: 1.00     Years: 5.00     Pack years: 5.00     Types: Cigarettes     Quit date: 1970     Years since quittin.7    Smokeless tobacco: Never   Vaping Use    Vaping Use: Never used   Substance and Sexual Activity    Alcohol use: Yes     Alcohol/week: 0.8 standard drinks     Types: 1 Glasses of wine per week     Comment: 4 nights a week    Drug use: No    Sexual activity: Not on file     Comment: Hysterectomy   Other Topics Concern    Not on file   Social History Narrative    Not on file     Social Determinants of Health     Financial Resource Strain: Not on file   Food Insecurity: Not on file   Transportation Needs: Not on file   Physical Activity: Not on file   Stress: Not on file   Social Connections: Not on file   Intimate Partner Violence: Not on file   Housing Stability: Not on file       Current Outpatient Medications   Medication Sig Dispense Refill    nystatin (MYCOSTATIN) powder       acetaminophen (TYLENOL) 500 mg tablet       diazePAM (VALIUM) 10 mg tablet       diclofenac (VOLTAREN) 1 % gel       omeprazole (PRILOSEC) 40 mg capsule TAKE 1 CAPSULE BY MOUTH DAILY 30 MINUTES BEFORE BREAKFAST      diclofenac (Flector) 1.3 % pt12 Apply 1 patch BID to the left shoulder as directed. 60 Patch 2    calcium phosphate-vitamin D3 250 mg-12.5 mcg (500 unit) chew Citracal-D3 Gummies 250 mg-12.5 mcg (500 unit) chewable tablet   Take 2 tablets every day by oral route. metoprolol tartrate (LOPRESSOR) 25 mg tablet TAKE 1 TABLET TWICE A DAY      thyroid, Pork, (ARMOUR) 60 mg tablet Take 60 mg by mouth daily. Plus 50 mg qdaily      apixaban (ELIQUIS) 5 mg tablet Take 5 mg by mouth two (2) times a day. flecainide acetate (FLECAINIDE PO) Take  by mouth two (2) times a day. predniSONE (DELTASONE) 1 mg tablet Take 3 mg by mouth daily. DULoxetine (CYMBALTA) 60 mg capsule Take 60 mg by mouth daily. rosuvastatin (CRESTOR) 20 mg tablet Take 20 mg by mouth nightly. cycloSPORINE (RESTASIS) 0.05 % dpet Administer 1 Drop to both eyes two (2) times a day. benazepriL (LOTENSIN) 40 mg tablet Take 40 mg by mouth daily.       lidocaine (LIDODERM) 5 % APPLY PATCH TO THE AFFECTED AREA FOR 12 HOURS A DAY AND REMOVE FOR 12 HOURS A DAY (Patient not taking: Reported on 9/8/2022) 60 Each 5       Allergies   Allergen Reactions    Adhesive Tape-Silicones Other (comments)    Levaquin [Levofloxacin] Nausea and Vomiting         REVIEW OF SYSTEMS    Constitutional: Negative for fever, chills, or weight change. Respiratory: Negative for cough or shortness of breath. Cardiovascular: Negative for chest pain or palpitations. Gastrointestinal: Negative for acid reflux, change in bowel habits, or constipation. Genitourinary: Negative for dysuria and flank pain. Musculoskeletal: Positive for lumbar pain and leg weakness  . Skin: Negative for rash. Neurological: Negative for headaches, dizziness, or numbness. Endo/Heme/Allergies: Negative for increased bruising. Psychiatric/Behavioral: Negative for difficulty with sleep. As per HPI    PHYSICAL EXAMINATION  Visit Vitals  BP (!) 118/58 (BP 1 Location: Right arm, BP Patient Position: Sitting, BP Cuff Size: Large adult)   Pulse 79   Temp 97.7 °F (36.5 °C) (Temporal)   Resp 16   Ht 5' 2.5\" (1.588 m)   Wt 193 lb 3.2 oz (87.6 kg)   SpO2 99%   BMI 34.77 kg/m²       Constitutional: Awake, alert, and in no acute distress. Neurological: Sensation to light touch is intact. Negative Hart's sign bilaterally. Skin: warm, dry, and intact. Musculoskeletal: Moderate pain with extension. Pain with axial loading. Relief with forward flexion. No pain with internal or external rotation of her hips. Negative straight leg raise bilaterally.       Biceps  Triceps Deltoids Wrist Ext Wrist Flex Hand Intrin   Right +4/5 +4/5 +4/5 +4/5 +4/5 +4/5   Left +4/5 +4/5 +4/5 +4/5 +4/5 +4/5      Hip Flex  Quads Hamstrings Ankle DF EHL Ankle PF   Right +4/5 +4/5 +4/5 +4/5 +4/5 +4/5   Left +4/5 +4/5 +4/5 +4/5 +4/5 +4/5     IMAGING:    Lumbar spine MRI from 12/20/2021 was personally reviewed with the patient and demonstrated:     FINDINGS:     Bone marrow signal is heterogeneous which is nonspecific but unchanged. Interval development in multilevel endplate edema most pronounced on T10-L1. Multilevel disc height loss and endplate osteophyte formation grossly unchanged. Trace anterolisthesis L4 on L5 stable. Posterior body fusion extending from L2 to L5 grossly unchanged. Fixation across  the sacroiliac joints is new when compared to prior exam. Blooming artifact from  metallic hardware limits evaluation. Extensive stranding and edema noted within  the posterior soft tissues, unchanged. Mild dextroscoliosis stable. Vertebral body heights unchanged. L1 hemangioma  unchanged. Conus terminates at T12-L1 and is otherwise unremarkable. Bulging disc and osteophyte complex again noted at T9-T10 and T10-T11. Question  slight progression with now mild spinal canal narrowing and mild-to-moderate  neuroforaminal narrowing most pronounced at T10-T11. T11-T12 demonstrates interval progression of broad-based bulging asymmetric to  the left with progression of neuroforaminal narrowing. Spinal canal narrowing  grossly unchanged. T12-L1: No definite spinal canal or neuroforaminal narrowing. L1-L2: Advanced facet arthropathy again noted. Bilobed bulging again noted. This  appears slightly decreased when compared to prior exam. Lateral recess narrowing  persist with some central canal narrowing grossly similar to mildly improved. Grossly similar neuroforaminal narrowing. Exam limited due to blooming artifact. L2-L3: Ligamentum flavum hypertrophy and facet arthropathy again noted. Disc  osteophyte again noted. Question slight progression in spinal canal narrowing. Question slight progression in neuroforaminal narrowing. L3-L4: Spinal canal is decompressed disc osteophyte complex again noted. Probable persistent right neuroforaminal narrowing. Left neuroforamen grossly  similar. L4-L5: Spinal canal is decompressed. Disc osteophyte complex again noted.   Neuroforaminal narrowing stable. L5-S1: Grossly similar facet arthropathy. Disc bulge unchanged. No definite  spinal canal narrowing. Neuroforaminal narrowing grossly unchanged. IMPRESSION     Interval sacroiliac joint fixation. Posterior body fixation lumbar spine, unchanged which limits evaluation. Interval development of marked endplate edema within the lower thoracic and  upper lumbar spine. Interval progression in overall multilevel degenerative findings causing various  degrees of spinal canal and neuroforaminal narrowing. Lumbar spine x-rays from 10/28/2021 were personally reviewed with the patient and demonstrated:     FINDINGS:     Similar levoscoliosis. Bilateral sacroiliac joint fusion. Posterior fusion with pedicle screws and rods from L2 to L5. Hardware appears intact. Marked disc height loss at L1/L2, L2/L3 and L5/S1 with endplate sclerosis and osteophytes. Moderate disc height loss from L3 to L5. Minimal grade 1 anterolisthesis of L4 is similar. Moderate bilateral facet joint arthropathy. Atherosclerotic vascular calcifications. IMPRESSION     1. Fusion L2-L5 with intact hardware. Similar grade 1 anterolisthesis of L4.   2. Lumbar spondylosis as above. Lumbar CT from 9/14/2020 was personally reviewed and demonstrated:    FINDINGS:     The imaged lung bases are clear. No retroperitoneal lymphadenopathy. Imaged portions of the kidneys, spleen, adrenal glands, and liver are normal.    Spinal cord terminates at the L1 level. Posterior fusion spanning L2-L5 with bilateral pedicle screws, interlocking vertical bars and multilevel laminotomies. No perihardware lucency to suggest loosening or infection. The left L5 pedicle screw is with its tip just outside the anterior cortex of L5. No fluid collection within the surgical bed. Vertebral body heights are preserved.     At T10-T11, there is a mild broad-based disc osteophyte complex without high-grade central canal or foraminal narrowing. At T11-T12, there is a mild broad-based disc osteophyte complex without high-grade central canal or foraminal narrowing. At T12-L1, there is a mild broad-based disc osteophyte complex without high-grade central canal or foraminal narrowing. L1-L2: There is disc desiccation with vacuum disc phenomenon and moderate intervertebral disc space narrowing. A mild broad-based disc bulge with mild bilateral foraminal narrowing. Mild bilateral facet arthropathy. Mild central canal narrowing at this level. L2-L3: There is broad-based disc osteophyte complex superimposed on mild/moderate facet arthropathy. Mild central canal narrowing. Mild bilateral foraminal narrowing. Donald Salter L3-L4: No high-grade central canal or foraminal narrowing. L4-L5: There is grade 1 anterolisthesis of L4 with respect L5 with slight distortion of the thecal sac , without high-grade central canal narrowing. Mild/moderate right foraminal narrowing. Mild left foraminal narrowing. Donald Salter L5-S1: There is disc desiccation, vacuum disc phenomenon and moderate intervertebral disc space narrowing. Moderate/severe left facet arthropathy with synovial air. Mild/moderate right facet arthropathy. Asymmetric left moderate foraminal narrowing. Mild/moderate right foraminal narrowing. Small focus of air related to right facet arthropathy is noted within the right subarticular recess. Mild central canal narrowing at this level. IMPRESSION    Posterior fusion spanning L2-L5 without evidence of hardware complication. Disproportionate discogenic degenerative changes at L1-L2 and L5-S1. Foraminal narrowing is most advanced at L5-S1. Facet arthropathy is most advanced on the left at L5-S1. No high-grade central canal narrowing throughout the lumbar spinal column. Please refer to the body of the report for a comprehensive segmental analysis of the lumbar spinal column.      Pelvic inlet/outlet x-rays from 10/28/2021 was personally reviewed and demonstrated:      FINDINGS: Bilateral sacroiliac iliac joint effusion. Sacroiliac joints appear intact. Hardware appears intact. Lower lumbar spine fusion hardware. IMPRESSION     1. Bilateral sacroiliac joint fusion          Cervical spine 2V x-rays from 7/27/2020 were personally reviewed and demonstrated:  Diffuse degenerative changes with listhesis at C2-3. Written by Amanda Simeon, as dictated by Ac Ely MD.  I, Dr. Ac Ely confirm that all documentation is accurate.

## 2022-11-17 ENCOUNTER — OFFICE VISIT (OUTPATIENT)
Dept: ORTHOPEDIC SURGERY | Age: 77
End: 2022-11-17
Payer: MEDICARE

## 2022-11-17 VITALS
HEART RATE: 74 BPM | OXYGEN SATURATION: 97 % | BODY MASS INDEX: 33.98 KG/M2 | TEMPERATURE: 97 F | WEIGHT: 191.8 LBS | HEIGHT: 63 IN

## 2022-11-17 DIAGNOSIS — M35.3 PMR (POLYMYALGIA RHEUMATICA) (HCC): ICD-10-CM

## 2022-11-17 DIAGNOSIS — Z79.01 CHRONIC ANTICOAGULATION: ICD-10-CM

## 2022-11-17 DIAGNOSIS — Z98.1 HISTORY OF LUMBAR FUSION: ICD-10-CM

## 2022-11-17 DIAGNOSIS — M62.838 MUSCLE SPASM: ICD-10-CM

## 2022-11-17 DIAGNOSIS — M53.3 SACROILIAC JOINT DYSFUNCTION: Primary | ICD-10-CM

## 2022-11-17 DIAGNOSIS — R29.898 WEAKNESS OF BOTH LEGS: ICD-10-CM

## 2022-11-17 DIAGNOSIS — M48.061 SPINAL STENOSIS OF LUMBAR REGION WITHOUT NEUROGENIC CLAUDICATION: ICD-10-CM

## 2022-11-17 PROCEDURE — G8536 NO DOC ELDER MAL SCRN: HCPCS | Performed by: PHYSICAL MEDICINE & REHABILITATION

## 2022-11-17 PROCEDURE — G8427 DOCREV CUR MEDS BY ELIG CLIN: HCPCS | Performed by: PHYSICAL MEDICINE & REHABILITATION

## 2022-11-17 PROCEDURE — 1101F PT FALLS ASSESS-DOCD LE1/YR: CPT | Performed by: PHYSICAL MEDICINE & REHABILITATION

## 2022-11-17 PROCEDURE — G8756 NO BP MEASURE DOC: HCPCS | Performed by: PHYSICAL MEDICINE & REHABILITATION

## 2022-11-17 PROCEDURE — G8417 CALC BMI ABV UP PARAM F/U: HCPCS | Performed by: PHYSICAL MEDICINE & REHABILITATION

## 2022-11-17 PROCEDURE — G8400 PT W/DXA NO RESULTS DOC: HCPCS | Performed by: PHYSICAL MEDICINE & REHABILITATION

## 2022-11-17 PROCEDURE — 1123F ACP DISCUSS/DSCN MKR DOCD: CPT | Performed by: PHYSICAL MEDICINE & REHABILITATION

## 2022-11-17 PROCEDURE — 3017F COLORECTAL CA SCREEN DOC REV: CPT | Performed by: PHYSICAL MEDICINE & REHABILITATION

## 2022-11-17 PROCEDURE — G8432 DEP SCR NOT DOC, RNG: HCPCS | Performed by: PHYSICAL MEDICINE & REHABILITATION

## 2022-11-17 PROCEDURE — 99213 OFFICE O/P EST LOW 20 MIN: CPT | Performed by: PHYSICAL MEDICINE & REHABILITATION

## 2022-11-17 PROCEDURE — 1090F PRES/ABSN URINE INCON ASSESS: CPT | Performed by: PHYSICAL MEDICINE & REHABILITATION

## 2022-11-17 NOTE — LETTER
11/19/2022    Patient: Geo Licona   YOB: 1945   Date of Visit: 11/17/2022     Sonia Paez MD  [address] [place] [phone]  Via Fax: [phone]    Dear Sonia Paez MD,      Thank you for referring Ms. Jennifer Swift to [address] for evaluation. My notes for this consultation are attached. If you have questions, please do not hesitate to call me. I look forward to following your patient along with you.       Sincerely,    Derek Saravia MD

## 2022-11-17 NOTE — PATIENT INSTRUCTIONS
Low Back Arthritis: Exercises  Introduction  Here are some examples of typical rehabilitation exercises for your condition. Start each exercise slowly. Ease off the exercise if you start to have pain. Your doctor or physical therapist will tell you when you can start these exercises and which ones will work best for you. When you are not being active, find a comfortable position for rest. Some people are comfortable on the floor or a medium-firm bed with a small pillow under their head and another under their knees. Some people prefer to lie on their side with a pillow between their knees. Don't stay in one position for too long. Take short walks (10 to 20 minutes) every 2 to 3 hours. Avoid slopes, hills, and stairs until you feel better. Walk only distances you can manage without pain, especially leg pain. How to do the exercises  Pelvic tilt  Lie on your back with your knees bent. \"Brace\" your stomach--tighten your muscles by pulling in and imagining your belly button moving toward your spine. Press your lower back into the floor. You should feel your hips and pelvis rock back. Hold for 6 seconds while breathing smoothly. Relax and allow your pelvis and hips to rock forward. Repeat 8 to 12 times. Back stretches  Get down on your hands and knees on the floor. Relax your head and allow it to droop. Round your back up toward the ceiling until you feel a nice stretch in your upper, middle, and lower back. Hold this stretch for as long as it feels comfortable, or about 15 to 30 seconds. Return to the starting position with a flat back while you are on your hands and knees. Let your back sway by pressing your stomach toward the floor. Lift your buttocks toward the ceiling. Hold this position for 15 to 30 seconds. Repeat 2 to 4 times. Follow-up care is a key part of your treatment and safety. Be sure to make and go to all appointments, and call your doctor if you are having problems.  It's also a good idea to know your test results and keep a list of the medicines you take. Current as of: March 9, 2022               Content Version: 13.4  © 2006-2022 [employer]. Care instructions adapted under license by [employer] (which disclaims liability or warranty for this information). If you have questions about a medical condition or this instruction, always ask your healthcare professional. [place] [address] any warranty or liability for your use of this information.

## 2022-11-17 NOTE — PROGRESS NOTES
[facility]  [address] [place] [address] [place]   Phone: [phone]  Fax: [phone]    Pt's YOB: 1945    ASSESSMENT   Diagnoses and all orders for this visit:    1. Sacroiliac joint dysfunction  -     SCHEDULE SURGERY    2. Muscle spasm    3. Weakness of both legs    4. History of lumbar fusion    5. Spinal stenosis of lumbar region without neurogenic claudication    6. Chronic anticoagulation    7. PMR (polymyalgia rheumatica) ([facility])       IMPRESSION AND PLAN:  Bonnie Lees is a 78 y.o. female with history of lumbar and sacroiliac pain and presents to the office today for follow up. Pt complains of continued lumbar pain across the left lateral hip and down the posterior aspect of the left leg, with a new onset of burning sensation in the right SI joint. She reports relief with undergoing physical therapy. 1) Pt was given information on lumbar arthritis exercises. 2) A right sacroiliac block was ordered at this time. 3) She will continue with physical therapy and I recommended pt try solomon chi and chair yoga. 4) Ms. Jamin Reza has a reminder for a \"due or due soon\" health maintenance. I have asked that she contact her primary care provider, Ananda Borden MD, for follow-up on this health maintenance. 5)  demonstrated consistency with prescribing. 6) Pt is not a candidate for NSAIDs due to chronic anticoagulation with Eliquis. 7) Pt has PMR and remains on low dose prednisone at 3 mg daily. Follow-up and Dispositions    Return in about 3 months (around 2/17/2023) for follow up. HISTORY OF PRESENT ILLNESS:  Bonnie Lees is a 78 y.o. female with history of lumbar and sacroiliac pain and presents to the office today for follow up. Pt complains of continued lumbar pain across the left lateral hip and down the posterior aspect of the left leg, with a new onset of burning sensation in the right SI joint.  She reports consistently undergoing physical therapy within the community she resides in with significant benefit, especially in the upper back. Pt admits to significant relief of the burning sensation in the left sacroiliac joint with previous left sacroiliac block. She has a HEP of strength training 5 x a week. Pt at this time desires to proceed with a right sacroiliac block. Pain Scale: 2/10    PCP: Caesar Smith MD     Past Medical History:   Diagnosis Date    Arthritis     Hypercholesterolemia     Hypertension     Ill-defined condition     osteopenia    Lower back pain     Migraine     PMR (polymyalgia rheumatica) ([facility])     Thyroid disease         Social History     Socioeconomic History    Marital status:      Spouse name: Not on file    Number of children: Not on file    Years of education: Not on file    Highest education level: Not on file   Occupational History    Not on file   Tobacco Use    Smoking status: Former     Packs/day: 1.00     Years: 5.00     Pack years: 5.00     Types: Cigarettes     Quit date: 1970     Years since quittin.9    Smokeless tobacco: Never   Vaping Use    Vaping Use: Never used   Substance and Sexual Activity    Alcohol use:  Yes     Alcohol/week: 0.8 standard drinks     Types: 1 Glasses of wine per week     Comment: 4 nights a week    Drug use: No    Sexual activity: Not on file     Comment: Hysterectomy   Other Topics Concern    Not on file   Social History Narrative    Not on file     Social Determinants of Health     Financial Resource Strain: Not on file   Food Insecurity: Not on file   Transportation Needs: Not on file   Physical Activity: Not on file   Stress: Not on file   Social Connections: Not on file   Intimate Partner Violence: Not on file   Housing Stability: Not on file       Current Outpatient Medications   Medication Sig Dispense Refill    nystatin (MYCOSTATIN) powder       acetaminophen (TYLENOL) 500 mg tablet       diazePAM (VALIUM) 10 mg tablet 10 mg as needed. diclofenac (VOLTAREN) 1 % gel       omeprazole (PRILOSEC) 40 mg capsule TAKE 1 CAPSULE BY MOUTH DAILY 30 MINUTES BEFORE BREAKFAST      calcium phosphate-vitamin D3 250 mg-12.5 mcg (500 unit) chew Citracal-D3 Gummies 250 mg-12.5 mcg (500 unit) chewable tablet   Take 2 tablets every day by oral route. metoprolol tartrate (LOPRESSOR) 25 mg tablet TAKE 1 TABLET TWICE A DAY      thyroid, Pork, (ARMOUR) 60 mg tablet Take 60 mg by mouth daily. Plus 50 mg qdaily      apixaban (ELIQUIS) 5 mg tablet Take 5 mg by mouth two (2) times a day. flecainide acetate (FLECAINIDE PO) Take  by mouth two (2) times a day. predniSONE (DELTASONE) 1 mg tablet Take 3 mg by mouth daily. DULoxetine (CYMBALTA) 60 mg capsule Take 60 mg by mouth daily. rosuvastatin (CRESTOR) 20 mg tablet Take 20 mg by mouth nightly. cycloSPORINE (RESTASIS) 0.05 % dpet Administer 1 Drop to both eyes two (2) times a day. benazepriL (LOTENSIN) 40 mg tablet Take 40 mg by mouth daily. diclofenac (Flector) 1.3 % pt12 Apply 1 patch BID to the left shoulder as directed. 60 Patch 2    lidocaine (LIDODERM) 5 % APPLY PATCH TO THE AFFECTED AREA FOR 12 HOURS A DAY AND REMOVE FOR 12 HOURS A DAY 60 Each 5       Allergies   Allergen Reactions    Adhesive Tape-Silicones Other (comments)    Levaquin [Levofloxacin] Nausea and Vomiting         REVIEW OF SYSTEMS    Constitutional: Negative for fever, chills, or weight change. Respiratory: Negative for cough or shortness of breath. Cardiovascular: Negative for chest pain or palpitations. Gastrointestinal: Negative for acid reflux, change in bowel habits, or constipation. Genitourinary: Negative for dysuria and flank pain. Musculoskeletal: Positive for lumbar pain and leg weakness. Positive for burning in the right SI joint. Skin: Negative for rash. Neurological: Negative for headaches, dizziness, or numbness.   Endo/Heme/Allergies: Negative for increased bruising. Psychiatric/Behavioral: Negative for difficulty with sleep. As per HPI    PHYSICAL EXAMINATION  Visit Vitals  Pulse 74   Temp 97 °F (36.1 °C) (Skin)   Ht 5' 3\" (1.6 m)   Wt 191 lb 12.8 oz (87 kg)   SpO2 97%   BMI 33.98 kg/m²       Constitutional: Awake, alert, and in no acute distress. Neurological: 1+ symmetrical DTRs in the upper extremities. 1+ symmetrical DTRs in the lower extremities. Sensation to light touch is intact. Negative Hart's sign bilaterally. Skin: warm, dry, and intact. Musculoskeletal: No pain with extension, axial loading, or forward flexion. No pain with internal or external rotation of her hips. Negative straight leg raise bilaterally. Tenderness to palpation in the right SI joint. Previous + HAYDEN's, thrust, and compression test.      Biceps  Triceps Deltoids Wrist Ext Wrist Flex Hand Intrin   Right +4/5 +4/5 +4/5 +4/5 +4/5 +4/5   Left +4/5 +4/5 +4/5 +4/5 +4/5 +4/5      Hip Flex  Quads Hamstrings Ankle DF EHL Ankle PF   Right +4/5 +4/5 +4/5 +4/5 +4/5 +4/5   Left +4/5 +4/5 +4/5 +4/5 +4/5 +4/5     IMAGING:    Lumbar spine MRI from 12/20/2021 was personally reviewed with the patient and demonstrated:     FINDINGS:     Bone marrow signal is heterogeneous which is nonspecific but unchanged. Interval development in multilevel endplate edema most pronounced on T10-L1. Multilevel disc height loss and endplate osteophyte formation grossly unchanged. Trace anterolisthesis L4 on L5 stable. Posterior body fusion extending from L2 to L5 grossly unchanged. Fixation across  the sacroiliac joints is new when compared to prior exam. Blooming artifact from  metallic hardware limits evaluation. Extensive stranding and edema noted within  the posterior soft tissues, unchanged. Mild dextroscoliosis stable. Vertebral body heights unchanged. L1 hemangioma  unchanged. Conus terminates at T12-L1 and is otherwise unremarkable.      Bulging disc and osteophyte complex again noted at T9-T10 and T10-T11. Question  slight progression with now mild spinal canal narrowing and mild-to-moderate  neuroforaminal narrowing most pronounced at T10-T11. T11-T12 demonstrates interval progression of broad-based bulging asymmetric to  the left with progression of neuroforaminal narrowing. Spinal canal narrowing  grossly unchanged. T12-L1: No definite spinal canal or neuroforaminal narrowing. L1-L2: Advanced facet arthropathy again noted. Bilobed bulging again noted. This  appears slightly decreased when compared to prior exam. Lateral recess narrowing  persist with some central canal narrowing grossly similar to mildly improved. Grossly similar neuroforaminal narrowing. Exam limited due to blooming artifact. L2-L3: Ligamentum flavum hypertrophy and facet arthropathy again noted. Disc  osteophyte again noted. Question slight progression in spinal canal narrowing. Question slight progression in neuroforaminal narrowing. L3-L4: Spinal canal is decompressed disc osteophyte complex again noted. Probable persistent right neuroforaminal narrowing. Left neuroforamen grossly  similar. L4-L5: Spinal canal is decompressed. Disc osteophyte complex again noted. Neuroforaminal narrowing stable. L5-S1: Grossly similar facet arthropathy. Disc bulge unchanged. No definite  spinal canal narrowing. Neuroforaminal narrowing grossly unchanged. IMPRESSION     Interval sacroiliac joint fixation. Posterior body fixation lumbar spine, unchanged which limits evaluation. Interval development of marked endplate edema within the lower thoracic and  upper lumbar spine. Interval progression in overall multilevel degenerative findings causing various  degrees of spinal canal and neuroforaminal narrowing. Lumbar spine x-rays from 10/28/2021 were personally reviewed with the patient and demonstrated:     FINDINGS:     Similar levoscoliosis. Bilateral sacroiliac joint fusion. Posterior fusion with pedicle screws and rods from L2 to L5. Hardware appears intact. Marked disc height loss at L1/L2, L2/L3 and L5/S1 with endplate sclerosis and osteophytes. Moderate disc height loss from L3 to L5. Minimal grade 1 anterolisthesis of L4 is similar. Moderate bilateral facet joint arthropathy. Atherosclerotic vascular calcifications. IMPRESSION     1. Fusion L2-L5 with intact hardware. Similar grade 1 anterolisthesis of L4.   2. Lumbar spondylosis as above. Lumbar CT from 9/14/2020 was personally reviewed and demonstrated:     FINDINGS:     The imaged lung bases are clear. No retroperitoneal lymphadenopathy. Imaged portions of the kidneys, spleen, adrenal glands, and liver are normal.    Spinal cord terminates at the L1 level. Posterior fusion spanning L2-L5 with bilateral pedicle screws, interlocking vertical bars and multilevel laminotomies. No perihardware lucency to suggest loosening or infection. The left L5 pedicle screw is with its tip just outside the anterior cortex of L5. No fluid collection within the surgical bed. Vertebral body heights are preserved. At T10-T11, there is a mild broad-based disc osteophyte complex without high-grade central canal or foraminal narrowing. At T11-T12, there is a mild broad-based disc osteophyte complex without high-grade central canal or foraminal narrowing. At T12-L1, there is a mild broad-based disc osteophyte complex without high-grade central canal or foraminal narrowing. L1-L2: There is disc desiccation with vacuum disc phenomenon and moderate intervertebral disc space narrowing. A mild broad-based disc bulge with mild bilateral foraminal narrowing. Mild bilateral facet arthropathy. Mild central canal narrowing at this level. L2-L3: There is broad-based disc osteophyte complex superimposed on mild/moderate facet arthropathy. Mild central canal narrowing. Mild bilateral foraminal narrowing. Kenan Olive       L3-L4: No high-grade central canal or foraminal narrowing. L4-L5: There is grade 1 anterolisthesis of L4 with respect L5 with slight distortion of the thecal sac , without high-grade central canal narrowing. Mild/moderate right foraminal narrowing. Mild left foraminal narrowing. Ralf Power L5-S1: There is disc desiccation, vacuum disc phenomenon and moderate intervertebral disc space narrowing. Moderate/severe left facet arthropathy with synovial air. Mild/moderate right facet arthropathy. Asymmetric left moderate foraminal narrowing. Mild/moderate right foraminal narrowing. Small focus of air related to right facet arthropathy is noted within the right subarticular recess. Mild central canal narrowing at this level. IMPRESSION    Posterior fusion spanning L2-L5 without evidence of hardware complication. Disproportionate discogenic degenerative changes at L1-L2 and L5-S1. Foraminal narrowing is most advanced at L5-S1. Facet arthropathy is most advanced on the left at L5-S1. No high-grade central canal narrowing throughout the lumbar spinal column. Please refer to the body of the report for a comprehensive segmental analysis of the lumbar spinal column. Pelvic inlet/outlet x-rays from 10/28/2021 was personally reviewed and demonstrated:      FINDINGS: Bilateral sacroiliac iliac joint effusion. Sacroiliac joints appear intact. Hardware appears intact. Lower lumbar spine fusion hardware. IMPRESSION     1. Bilateral sacroiliac joint fusion       Cervical spine 2V x-rays from 7/27/2020 were personally reviewed and demonstrated:  Diffuse degenerative changes with listhesis at C2-3. Written by Shaylee Clayton, as dictated by Analilia Ruiz MD.  I, Dr. Analilia Ruiz confirm that all documentation is accurate.

## 2022-12-07 ENCOUNTER — APPOINTMENT (OUTPATIENT)
Dept: GENERAL RADIOLOGY | Age: 77
End: 2022-12-07
Attending: PHYSICAL MEDICINE & REHABILITATION
Payer: MEDICARE

## 2022-12-07 ENCOUNTER — HOSPITAL ENCOUNTER (OUTPATIENT)
Age: 77
Setting detail: OUTPATIENT SURGERY
Discharge: HOME | End: 2022-12-07
Attending: PHYSICAL MEDICINE & REHABILITATION | Admitting: PHYSICAL MEDICINE & REHABILITATION
Payer: MEDICARE

## 2022-12-07 VITALS
HEART RATE: 88 BPM | SYSTOLIC BLOOD PRESSURE: 145 MMHG | DIASTOLIC BLOOD PRESSURE: 78 MMHG | RESPIRATION RATE: 16 BRPM | TEMPERATURE: 98.1 F | OXYGEN SATURATION: 96 %

## 2022-12-07 DIAGNOSIS — M53.3 SACROILIAC JOINT DYSFUNCTION: Primary | ICD-10-CM

## 2022-12-07 PROCEDURE — 27096 INJECT SACROILIAC JOINT: CPT | Performed by: PHYSICAL MEDICINE & REHABILITATION

## 2022-12-07 PROCEDURE — 77030039433 HC TY MYLEOGRAM BD -B: Performed by: PHYSICAL MEDICINE & REHABILITATION

## 2022-12-07 PROCEDURE — 74011000250 HC RX REV CODE- 250: Performed by: PHYSICAL MEDICINE & REHABILITATION

## 2022-12-07 PROCEDURE — 77030003676 HC NDL SPN MPRI -A: Performed by: PHYSICAL MEDICINE & REHABILITATION

## 2022-12-07 PROCEDURE — 74011000636 HC RX REV CODE- 636: Performed by: PHYSICAL MEDICINE & REHABILITATION

## 2022-12-07 PROCEDURE — 2709999900 HC NON-CHARGEABLE SUPPLY: Performed by: PHYSICAL MEDICINE & REHABILITATION

## 2022-12-07 PROCEDURE — 74011250636 HC RX REV CODE- 250/636: Performed by: PHYSICAL MEDICINE & REHABILITATION

## 2022-12-07 PROCEDURE — 76010000009 HC PAIN MGT 0 TO 30 MIN PROC: Performed by: PHYSICAL MEDICINE & REHABILITATION

## 2022-12-07 RX ORDER — DEXAMETHASONE SODIUM PHOSPHATE 100 MG/10ML
INJECTION INTRAMUSCULAR; INTRAVENOUS AS NEEDED
Status: DISCONTINUED | OUTPATIENT
Start: 2022-12-07 | End: 2022-12-07 | Stop reason: HOSPADM

## 2022-12-07 RX ORDER — DIAZEPAM 5 MG/1
5-20 TABLET ORAL ONCE
Status: DISCONTINUED | OUTPATIENT
Start: 2022-12-07 | End: 2022-12-07 | Stop reason: HOSPADM

## 2022-12-07 RX ORDER — LIDOCAINE HYDROCHLORIDE 10 MG/ML
INJECTION, SOLUTION EPIDURAL; INFILTRATION; INTRACAUDAL; PERINEURAL AS NEEDED
Status: DISCONTINUED | OUTPATIENT
Start: 2022-12-07 | End: 2022-12-07 | Stop reason: HOSPADM

## 2022-12-07 NOTE — H&P
Date of Surgery Update:  Rob Valdez was seen and examined. History and physical has been reviewed. The patient has been examined. There have been no significant clinical changes since the last office visit. The patient was counseled at length about the risks of ruby Covid-19 during their perioperative period and any recovery window from their procedure. The patient was made aware that ruby Covid-19  may worsen their prognosis for recovering from their procedure and lend to a higher morbidity and/or mortality risk. All material risks, benefits, and reasonable alternatives including postponing the procedure were discussed. The patient does  wish to proceed with the procedure at this time.     Pre Injection pain level:                   4/10  Post Injection pain level:                 0 /10       Signed By: Olegario Sauceda MD     December 7, 2022 1:21 PM

## 2022-12-07 NOTE — PERIOP NOTES
Patient verbalized understanding of discharge instructions and verbally consented to [facility] Patient Consent. Signature pad not working.

## 2022-12-07 NOTE — PROCEDURES
Procedure Note    Patient Name: Leo Gar    Date of Procedure: December 7, 2022    Preoperative Diagnosis: Sacroiliac Joint Dysfunction    Post Operative Diagnosis: same    Procedure: SI Joint Injection right     Consent: Informed consent was obtained prior to the procedure. The patient was given the opportunity to ask questions regarding the procedure and its associated risks. In addition to the potential risks associated with the procedure itself, the patient was informed both verbally and in writing of potential side effects of the use of glucocorticoids. The patient appeared to comprehend the informed consent and desired to have the procedure performed. Procedure: The patient was placed in the prone position on the flouroscopy table and the back was prepped and draped in the usual sterile manner. A #22 gauge spinal needle was then advanced to lie within the SI joint after local Lidocaine 1% injection. 1 cc isovue used to confirm  the position. A total of 10 mg of preservative free dexamethasone and 5 cc of Lidocaine was introduced into and around the SI joint. The injection area was cleaned and bandaids applied. No excessive bleeding was noted. Patient dressed and was discharged to home with instructions. Discussion: The patient tolerated the procedure well.      Carol Sauceda MD  December 7, 2022

## 2023-02-16 ENCOUNTER — OFFICE VISIT (OUTPATIENT)
Age: 78
End: 2023-02-16
Payer: MEDICARE

## 2023-02-16 VITALS
TEMPERATURE: 97.3 F | HEART RATE: 68 BPM | OXYGEN SATURATION: 98 % | WEIGHT: 184 LBS | HEIGHT: 62 IN | BODY MASS INDEX: 33.86 KG/M2

## 2023-02-16 DIAGNOSIS — Z79.01 LONG TERM (CURRENT) USE OF ANTICOAGULANTS: ICD-10-CM

## 2023-02-16 DIAGNOSIS — M62.838 OTHER MUSCLE SPASM: ICD-10-CM

## 2023-02-16 DIAGNOSIS — Z79.01 CHRONIC ANTICOAGULATION: ICD-10-CM

## 2023-02-16 DIAGNOSIS — M48.061 SPINAL STENOSIS, LUMBAR REGION WITHOUT NEUROGENIC CLAUDICATION: ICD-10-CM

## 2023-02-16 DIAGNOSIS — M53.3 SACROCOCCYGEAL DISORDERS, NOT ELSEWHERE CLASSIFIED: Primary | ICD-10-CM

## 2023-02-16 DIAGNOSIS — M35.3 POLYMYALGIA RHEUMATICA (HCC): ICD-10-CM

## 2023-02-16 PROCEDURE — G8427 DOCREV CUR MEDS BY ELIG CLIN: HCPCS | Performed by: PHYSICAL MEDICINE & REHABILITATION

## 2023-02-16 PROCEDURE — 1090F PRES/ABSN URINE INCON ASSESS: CPT | Performed by: PHYSICAL MEDICINE & REHABILITATION

## 2023-02-16 PROCEDURE — G8400 PT W/DXA NO RESULTS DOC: HCPCS | Performed by: PHYSICAL MEDICINE & REHABILITATION

## 2023-02-16 PROCEDURE — 1036F TOBACCO NON-USER: CPT | Performed by: PHYSICAL MEDICINE & REHABILITATION

## 2023-02-16 PROCEDURE — G8417 CALC BMI ABV UP PARAM F/U: HCPCS | Performed by: PHYSICAL MEDICINE & REHABILITATION

## 2023-02-16 PROCEDURE — G8484 FLU IMMUNIZE NO ADMIN: HCPCS | Performed by: PHYSICAL MEDICINE & REHABILITATION

## 2023-02-16 PROCEDURE — 1123F ACP DISCUSS/DSCN MKR DOCD: CPT | Performed by: PHYSICAL MEDICINE & REHABILITATION

## 2023-02-16 PROCEDURE — 99213 OFFICE O/P EST LOW 20 MIN: CPT | Performed by: PHYSICAL MEDICINE & REHABILITATION

## 2023-02-16 PROCEDURE — 3017F COLORECTAL CA SCREEN DOC REV: CPT | Performed by: PHYSICAL MEDICINE & REHABILITATION

## 2023-02-16 RX ORDER — FLECAINIDE ACETATE 100 MG/1
TABLET ORAL
COMMUNITY
Start: 2022-06-22

## 2023-02-16 NOTE — PROGRESS NOTES
[facility]  [address] [facility], [facility]   Phone: [phone]  Fax: [phone]    Pt's YOB: 1945    ASSESSMENT   Epi Jose was seen today for back problem. Diagnoses and all orders for this visit:    Sacrococcygeal disorders, not elsewhere classified    Other muscle spasm    Spinal stenosis, lumbar region without neurogenic claudication    Long term (current) use of anticoagulants    Polymyalgia rheumatica (HCC)    Chronic anticoagulation       IMPRESSION AND PLAN:  Sonia Pham is a 78 y.o. female with history of lumbar and sacroiliac pain and presents to the office today for follow up. Pt continues to complain of pain in the lumbar pain across the left lateral hip and into the posterior aspect of the left leg, improved significant since her last office visit. She has a hx of PMR so she remains on low dose prednisone at 3 mg daily. 1) Pt was given information on sacroiliac exercises. 2) She will continue with her HEP and follow back in 3 months. 3) Pt is not a candidate for NSAIDs due to chronic anticoagulation with Eliquis. 4) She has PMR and remains on low dose prednisone at 3 mg daily. 5) Ms. Moise Bautista has a reminder for a \"due or due soon\" health maintenance. I have asked that she contact her primary care provider, Meño Pitts MD, for follow-up on this health maintenance. 6)  demonstrated consistency with prescribing. Return in about 3 months (around 5/16/2023). HISTORY OF PRESENT ILLNESS:  Sonia Pham is a 78 y.o. female with history of lumbar and sacroiliac pain and presents to the office today for follow up. Pt continues to complain of pain in the lumbar pain across the left lateral hip and into the posterior aspect of the left leg, improved significant since her last office visit.  She reports consistently undergoing lumbar, upper back, and balance physical therapy with a different physical therapist in [facility] which has benefited her lumbar symptoms. Of note, pt resides in [facility]. Pt mentions she has a HEP of strength training, performing exercises given at physical therapy which has led her to losing 16 pounds since her last office visit. She underwent a sacroiliac block injection on 2022 and has a hx of PMR so she remains on low dose prednisone at 3 mg daily. Pt at this time desires to continue with current care. Pain Scale: 210    PCP: Valeria March MD         Diagnosis Date    Arthritis     Hypercholesterolemia     Hypertension     Ill-defined condition     osteopenia    Lower back pain     Migraine     PMR (polymyalgia rheumatica) (HCC)     Thyroid disease         Social History     Tobacco Use    Smoking status: Former     Packs/day: 1.00     Types: Cigarettes     Quit date: 1970     Years since quittin.1    Smokeless tobacco: Never   Substance Use Topics    Alcohol use: Yes     Alcohol/week: 0.8 standard drinks    Drug use: No          Current Outpatient Medications:     flecainide (TAMBOCOR) 100 MG tablet, , Disp: , Rfl:     acetaminophen (TYLENOL) 500 MG tablet, ceived the following from [facility]: Outside name: acetaminophen (TYLENOL) 500 mg tablet, Disp: , Rfl:     apixaban (ELIQUIS) 5 MG TABS tablet, Take 5 mg by mouth 2 times daily, Disp: , Rfl:     benazepril (LOTENSIN) 40 MG tablet, Take 40 mg by mouth daily, Disp: , Rfl:     cycloSPORINE (RESTASIS) 0.05 % ophthalmic emulsion, Apply 1 drop to eye 2 times daily, Disp: , Rfl:     diazePAM (VALIUM) 10 MG tablet, 10 mg as needed. , Disp: , Rfl:     diclofenac sodium (VOLTAREN) 1 % GEL, ceived the following from [facility]: Outside name: diclofenac (VOLTAREN) 1 % gel, Disp: , Rfl:     DULoxetine (CYMBALTA) 60 MG extended release capsule, Take 60 mg by mouth daily, Disp: , Rfl:     metoprolol tartrate (LOPRESSOR) 25 MG tablet, TAKE 1 TABLET TWICE A DAY, Disp: , Rfl:     nystatin (MYCOSTATIN) 870764 UNIT/GM powder, ceived the following from [facility]: Outside name: nystatin (MYCOSTATIN) powder, Disp: , Rfl:     omeprazole (PRILOSEC) 40 MG delayed release capsule, TAKE 1 CAPSULE BY MOUTH DAILY 30 MINUTES BEFORE BREAKFAST, Disp: , Rfl:     predniSONE (DELTASONE) 1 MG tablet, Take 3 mg by mouth daily, Disp: , Rfl:     rosuvastatin (CRESTOR) 20 MG tablet, Take 20 mg by mouth, Disp: , Rfl:     thyroid (ARMOUR) 60 MG tablet, Take 60 mg by mouth daily, Disp: , Rfl:      Allergies   Allergen Reactions    Adhesive Tape Other (See Comments) and Rash     Paper tape is ok    Levofloxacin Nausea And Vomiting         REVIEW OF SYSTEMS    Constitutional: Negative for fever or chills. Positive for intentional weight loss. Respiratory: Negative for cough or shortness of breath. Cardiovascular: Negative for chest pain or palpitations. Gastrointestinal: Negative for acid reflux, change in bowel habits, or constipation. Genitourinary: Negative for dysuria and flank pain. Musculoskeletal: Positive for lumbar and sacroiliac pain. Skin: Negative for rash. Neurological: Negative for headaches, dizziness, or numbness. Endo/Heme/Allergies: Negative for increased bruising. Psychiatric/Behavioral: Negative for difficulty with sleep. As per HPI    PHYSICAL EXAMINATION  Pulse 68   Temp 97.3 °F (36.3 °C) (Skin)   Ht 5' 2\" (1.575 m)   Wt 184 lb (83.5 kg)   SpO2 98%   BMI 33.65 kg/m²     Constitutional: Awake, alert, and in no acute distress. Neurological: 1+ symmetrical DTRs in the upper extremities. 1+ symmetrical DTRs in the lower extremities. Sensation to light touch is intact. Negative Neri's sign bilaterally. Skin: warm, dry, and intact. Musculoskeletal: No pain with extension, axial loading, or forward flexion. No pain with internal or external rotation of her hips. Negative straight leg raise bilaterally.       Biceps  Triceps Deltoids Wrist Ext Wrist Flex Hand Intrin   Right +4/5 +4/5 +4/5 +4/5 +4/5 +4/5   Left +4/5 +4/5 +4/5 +4/5 +4/5 +4/5      Hip Flex  Quads Hamstrings Ankle DF EHL Ankle PF   Right +4/5 +4/5 +4/5 +4/5 +4/5 +4/5   Left +4/5 +4/5 +4/5 +4/5 +4/5 +4/5     IMAGING:    Lumbar spine MRI from 12/20/2021 was personally reviewed with the patient and demonstrated:  FINDINGS:      Bone marrow signal is heterogeneous which is nonspecific but unchanged. Interval development in multilevel endplate edema most pronounced on T10-L1. Multilevel disc height loss and endplate osteophyte formation grossly unchanged. Trace anterolisthesis L4 on L5 stable. Posterior body fusion extending from L2 to L5 grossly unchanged. Fixation across  the sacroiliac joints is new when compared to prior exam. Blooming artifact from  metallic hardware limits evaluation. Extensive stranding and edema noted within  the posterior soft tissues, unchanged. Mild dextroscoliosis stable. Vertebral body heights unchanged. L1 hemangioma  unchanged. Conus terminates at T12-L1 and is otherwise unremarkable. Bulging disc and osteophyte complex again noted at T9-T10 and T10-T11. Question  slight progression with now mild spinal canal narrowing and mild-to-moderate  neuroforaminal narrowing most pronounced at T10-T11. T11-T12 demonstrates interval progression of broad-based bulging asymmetric to  the left with progression of neuroforaminal narrowing. Spinal canal narrowing  grossly unchanged. T12-L1: No definite spinal canal or neuroforaminal narrowing. L1-L2: Advanced facet arthropathy again noted. Bilobed bulging again noted. This  appears slightly decreased when compared to prior exam. Lateral recess narrowing  persist with some central canal narrowing grossly similar to mildly improved. Grossly similar neuroforaminal narrowing. Exam limited due to blooming artifact.       L2-L3: Ligamentum flavum hypertrophy and facet arthropathy again noted. Disc  osteophyte again noted. Question slight progression in spinal canal narrowing. Question slight progression in neuroforaminal narrowing. L3-L4: Spinal canal is decompressed disc osteophyte complex again noted. Probable persistent right neuroforaminal narrowing. Left neuroforamen grossly  similar. L4-L5: Spinal canal is decompressed. Disc osteophyte complex again noted. Neuroforaminal narrowing stable. L5-S1: Grossly similar facet arthropathy. Disc bulge unchanged. No definite  spinal canal narrowing. Neuroforaminal narrowing grossly unchanged. IMPRESSION      Interval sacroiliac joint fixation. Posterior body fixation lumbar spine, unchanged which limits evaluation. Interval development of marked endplate edema within the lower thoracic and   upper lumbar spine. Interval progression in overall multilevel degenerative findings causing various   degrees of spinal canal and neuroforaminal narrowing. Lumbar spine x-rays from 10/28/2021 were personally reviewed with the patient and demonstrated:      FINDINGS:     Similar levoscoliosis. Bilateral sacroiliac joint fusion. Posterior fusion with pedicle screws and rods from L2 to L5. Hardware appears intact. Marked disc  height loss at L1/L2, L2/L3 and L5/S1 with endplate sclerosis and osteophytes. Moderate disc height loss from L3 to L5. Minimal grade 1 anterolisthesis of L4 is similar. Moderate bilateral facet joint arthropathy. Atherosclerotic vascular calcifications. IMPRESSION     1. Fusion L2-L5 with intact hardware. Similar grade 1 anterolisthesis of L4.   2. Lumbar spondylosis as above. Lumbar CT from 9/14/2020 was personally reviewed and demonstrated:  FINDINGS:     The imaged lung bases are clear. No retroperitoneal lymphadenopathy. Imaged portions of the kidneys, spleen, adrenal glands, and liver are normal.    Spinal cord terminates at the L1 level.     Posterior fusion spanning  L2-L5 with bilateral pedicle screws, interlocking vertical bars and multilevel laminotomies. No perihardware lucency to suggest loosening or infection. The left L5 pedicle screw is with its tip just outside the anterior cortex of L5. No fluid collection  within the surgical bed. Vertebral body heights are preserved. At T10-T11, there is a mild broad-based disc osteophyte complex without high-grade central canal or foraminal narrowing. At T11-T12, there is a mild broad-based  disc osteophyte complex without high-grade central canal or foraminal narrowing. At T12-L1, there is a mild broad-based disc osteophyte complex without high-grade central canal or foraminal narrowing. L1-L2: There is disc desiccation  with vacuum disc phenomenon and moderate intervertebral disc space narrowing. A mild broad-based disc bulge with mild bilateral foraminal narrowing. Mild bilateral facet arthropathy. Mild central canal narrowing at this level. L2-L3: There is  broad-based disc osteophyte complex superimposed on mild/moderate facet arthropathy. Mild central canal narrowing. Mild bilateral foraminal narrowing. Danii Bloodgood L3-L4: No high-grade central canal or foraminal narrowing. L4-L5: There is  grade 1 anterolisthesis of L4 with respect L5 with slight distortion of the thecal sac , without high-grade central canal narrowing. Mild/moderate right foraminal narrowing. Mild left foraminal narrowing. Danii Bloodgood L5-S1: There is disc desiccation,  vacuum disc phenomenon and moderate intervertebral disc space narrowing. Moderate/severe left facet arthropathy with synovial air. Mild/moderate right facet arthropathy. Asymmetric left moderate foraminal narrowing. Mild/moderate right foraminal narrowing. Small focus of air related to right facet arthropathy is noted within the right subarticular recess. Mild central canal narrowing at this level.      IMPRESSION    Posterior fusion spanning L2-L5 without evidence of hardware complication. Disproportionate discogenic degenerative changes at L1-L2 and L5-S1. Foraminal narrowing is most advanced at L5-S1. Facet arthropathy is most advanced on the left at L5-S1. No high-grade central canal narrowing throughout  the lumbar spinal column. Please refer to the body of the report for a comprehensive segmental analysis of the lumbar spinal column. Pelvic inlet/outlet x-rays from 10/28/2021 was personally reviewed and demonstrated:    FINDINGS: Bilateral sacroiliac iliac joint effusion. Sacroiliac joints appear intact. Hardware appears intact. Lower lumbar spine fusion hardware. IMPRESSION     1. Bilateral sacroiliac joint fusion        Cervical spine 2V x-rays from 7/27/2020 were personally reviewed and demonstrated:  Diffuse degenerative changes with listhesis at C2-3. Written by Tamia Gonzalez, as dictated by Carlitos Andino MD.  I, Dr. Carlitos Andino confirm that all documentation is accurate.

## 2023-05-18 ENCOUNTER — OFFICE VISIT (OUTPATIENT)
Age: 78
End: 2023-05-18
Payer: MEDICARE

## 2023-05-18 VITALS
BODY MASS INDEX: 33.84 KG/M2 | OXYGEN SATURATION: 97 % | TEMPERATURE: 98.1 F | HEART RATE: 75 BPM | RESPIRATION RATE: 18 BRPM | WEIGHT: 185 LBS

## 2023-05-18 DIAGNOSIS — M53.3 SACROCOCCYGEAL DISORDERS, NOT ELSEWHERE CLASSIFIED: ICD-10-CM

## 2023-05-18 DIAGNOSIS — M48.061 SPINAL STENOSIS, LUMBAR REGION WITHOUT NEUROGENIC CLAUDICATION: Primary | ICD-10-CM

## 2023-05-18 DIAGNOSIS — M35.3 POLYMYALGIA RHEUMATICA (HCC): ICD-10-CM

## 2023-05-18 DIAGNOSIS — Z79.01 LONG TERM (CURRENT) USE OF ANTICOAGULANTS: ICD-10-CM

## 2023-05-18 PROCEDURE — 99213 OFFICE O/P EST LOW 20 MIN: CPT | Performed by: PHYSICAL MEDICINE & REHABILITATION

## 2023-05-18 PROCEDURE — G8417 CALC BMI ABV UP PARAM F/U: HCPCS | Performed by: PHYSICAL MEDICINE & REHABILITATION

## 2023-05-18 PROCEDURE — 3017F COLORECTAL CA SCREEN DOC REV: CPT | Performed by: PHYSICAL MEDICINE & REHABILITATION

## 2023-05-18 PROCEDURE — G8427 DOCREV CUR MEDS BY ELIG CLIN: HCPCS | Performed by: PHYSICAL MEDICINE & REHABILITATION

## 2023-05-18 PROCEDURE — 1090F PRES/ABSN URINE INCON ASSESS: CPT | Performed by: PHYSICAL MEDICINE & REHABILITATION

## 2023-05-18 PROCEDURE — G8400 PT W/DXA NO RESULTS DOC: HCPCS | Performed by: PHYSICAL MEDICINE & REHABILITATION

## 2023-05-18 PROCEDURE — 1036F TOBACCO NON-USER: CPT | Performed by: PHYSICAL MEDICINE & REHABILITATION

## 2023-05-18 PROCEDURE — 1123F ACP DISCUSS/DSCN MKR DOCD: CPT | Performed by: PHYSICAL MEDICINE & REHABILITATION

## 2023-05-18 RX ORDER — AMOXICILLIN AND CLAVULANATE POTASSIUM 875; 125 MG/1; MG/1
1 TABLET, FILM COATED ORAL EVERY 12 HOURS
COMMUNITY
Start: 2023-04-06

## 2023-07-20 ENCOUNTER — OFFICE VISIT (OUTPATIENT)
Age: 78
End: 2023-07-20
Payer: MEDICARE

## 2023-07-20 VITALS
TEMPERATURE: 97.6 F | HEIGHT: 62 IN | OXYGEN SATURATION: 97 % | HEART RATE: 85 BPM | SYSTOLIC BLOOD PRESSURE: 126 MMHG | WEIGHT: 185 LBS | DIASTOLIC BLOOD PRESSURE: 76 MMHG | BODY MASS INDEX: 34.04 KG/M2

## 2023-07-20 DIAGNOSIS — M53.3 SACROILIAC JOINT DYSFUNCTION: Primary | ICD-10-CM

## 2023-07-20 DIAGNOSIS — Z79.01 LONG TERM (CURRENT) USE OF ANTICOAGULANTS: ICD-10-CM

## 2023-07-20 DIAGNOSIS — R26.9 GAIT ABNORMALITY: ICD-10-CM

## 2023-07-20 DIAGNOSIS — Z98.890 STATUS POST LEFT FOOT SURGERY: ICD-10-CM

## 2023-07-20 DIAGNOSIS — M43.16 SPONDYLOLISTHESIS OF LUMBAR REGION: ICD-10-CM

## 2023-07-20 PROCEDURE — 99213 OFFICE O/P EST LOW 20 MIN: CPT | Performed by: PHYSICAL MEDICINE & REHABILITATION

## 2023-07-20 PROCEDURE — 3017F COLORECTAL CA SCREEN DOC REV: CPT | Performed by: PHYSICAL MEDICINE & REHABILITATION

## 2023-07-20 PROCEDURE — G8400 PT W/DXA NO RESULTS DOC: HCPCS | Performed by: PHYSICAL MEDICINE & REHABILITATION

## 2023-07-20 PROCEDURE — 1090F PRES/ABSN URINE INCON ASSESS: CPT | Performed by: PHYSICAL MEDICINE & REHABILITATION

## 2023-07-20 PROCEDURE — G8417 CALC BMI ABV UP PARAM F/U: HCPCS | Performed by: PHYSICAL MEDICINE & REHABILITATION

## 2023-07-20 PROCEDURE — 1036F TOBACCO NON-USER: CPT | Performed by: PHYSICAL MEDICINE & REHABILITATION

## 2023-07-20 PROCEDURE — 3074F SYST BP LT 130 MM HG: CPT | Performed by: PHYSICAL MEDICINE & REHABILITATION

## 2023-07-20 PROCEDURE — 3078F DIAST BP <80 MM HG: CPT | Performed by: PHYSICAL MEDICINE & REHABILITATION

## 2023-07-20 PROCEDURE — 1123F ACP DISCUSS/DSCN MKR DOCD: CPT | Performed by: PHYSICAL MEDICINE & REHABILITATION

## 2023-07-20 PROCEDURE — G8427 DOCREV CUR MEDS BY ELIG CLIN: HCPCS | Performed by: PHYSICAL MEDICINE & REHABILITATION

## 2023-07-20 NOTE — H&P (VIEW-ONLY)
Current Outpatient Medications:     flecainide (TAMBOCOR) 100 MG tablet, , Disp: , Rfl:     apixaban (ELIQUIS) 5 MG TABS tablet, Take 1 tablet by mouth 2 times daily, Disp: , Rfl:     benazepril (LOTENSIN) 40 MG tablet, Take 1 tablet by mouth daily, Disp: , Rfl:     cycloSPORINE (RESTASIS) 0.05 % ophthalmic emulsion, Apply 1 drop to eye 2 times daily, Disp: , Rfl:     diazePAM (VALIUM) 10 MG tablet, 1 tablet as needed. , Disp: , Rfl:     diclofenac sodium (VOLTAREN) 1 % GEL, ceived the following from [facility]: Outside name: diclofenac (VOLTAREN) 1 % gel, Disp: , Rfl:     DULoxetine (CYMBALTA) 60 MG extended release capsule, Take 1 capsule by mouth daily, Disp: , Rfl:     metoprolol tartrate (LOPRESSOR) 25 MG tablet, TAKE 1 TABLET TWICE A DAY, Disp: , Rfl:     nystatin (MYCOSTATIN) 607055 UNIT/GM powder, ceived the following from [facility]: Outside name: nystatin (MYCOSTATIN) powder, Disp: , Rfl:     omeprazole (PRILOSEC) 40 MG delayed release capsule, TAKE 1 CAPSULE BY MOUTH DAILY 30 MINUTES BEFORE BREAKFAST, Disp: , Rfl:     predniSONE (DELTASONE) 1 MG tablet, Take 2 tablets by mouth daily, Disp: , Rfl:     rosuvastatin (CRESTOR) 20 MG tablet, Take 1 tablet by mouth, Disp: , Rfl:     thyroid (ARMOUR) 60 MG tablet, Take 1 tablet by mouth daily, Disp: , Rfl:      Allergies   Allergen Reactions    Adhesive Tape Other (See Comments) and Rash     Paper tape is ok    Levofloxacin Nausea And Vomiting         REVIEW OF SYSTEMS    Constitutional: Negative for fever, chills, or weight change. Respiratory: Negative for cough or shortness of breath. Cardiovascular: Negative for chest pain or palpitations. Gastrointestinal: Negative for acid reflux, change in bowel habits, or constipation. Genitourinary: Negative for dysuria and flank pain. Musculoskeletal: Positive for lumbar and sacroiliac pain. Skin: Negative for rash.    Neurological: Negative for headaches, dizziness, or

## 2023-07-26 ENCOUNTER — TELEPHONE (OUTPATIENT)
Age: 78
End: 2023-07-26

## 2023-07-26 NOTE — TELEPHONE ENCOUNTER
Patient called and asked was she still scheduled for injection with Dr. Silvio Ownes and I advised her that she was. She then stated that someone was supposed to be calling her to confirm that they had spoken to her cardiologist and podiatrist regarding the injection and medication she was taken. She says she has spoken to the cardiologist but has a few questions. Please contact her back at the earliest convenience. Patient can be reached at [phone].

## 2023-08-08 ENCOUNTER — HOSPITAL ENCOUNTER (OUTPATIENT)
Facility: HOSPITAL | Age: 78
Discharge: HOME | End: 2023-08-11
Payer: MEDICARE

## 2023-08-08 VITALS
RESPIRATION RATE: 16 BRPM | OXYGEN SATURATION: 94 % | TEMPERATURE: 98 F | DIASTOLIC BLOOD PRESSURE: 76 MMHG | SYSTOLIC BLOOD PRESSURE: 150 MMHG | HEART RATE: 74 BPM

## 2023-08-08 PROCEDURE — G0260 INJ FOR SACROILIAC JT ANESTH: HCPCS

## 2023-08-08 PROCEDURE — 27096 INJECT SACROILIAC JOINT: CPT | Performed by: PHYSICAL MEDICINE & REHABILITATION

## 2023-08-08 PROCEDURE — 6360000004 HC RX CONTRAST MEDICATION: Performed by: PHYSICAL MEDICINE & REHABILITATION

## 2023-08-08 PROCEDURE — 6360000002 HC RX W HCPCS: Performed by: PHYSICAL MEDICINE & REHABILITATION

## 2023-08-08 PROCEDURE — 27096 INJECT SACROILIAC JOINT: CPT

## 2023-08-08 PROCEDURE — 2500000003 HC RX 250 WO HCPCS: Performed by: PHYSICAL MEDICINE & REHABILITATION

## 2023-08-08 RX ORDER — DEXAMETHASONE SODIUM PHOSPHATE 10 MG/ML
10 INJECTION, SOLUTION INTRAMUSCULAR; INTRAVENOUS ONCE
Status: COMPLETED | OUTPATIENT
Start: 2023-08-08 | End: 2023-08-08

## 2023-08-08 RX ORDER — ACETAMINOPHEN 500 MG
500 TABLET ORAL PRN
COMMUNITY

## 2023-08-08 RX ORDER — DIAZEPAM 5 MG/1
2.5 TABLET ORAL ONCE
Status: DISCONTINUED | OUTPATIENT
Start: 2023-08-08 | End: 2023-08-12 | Stop reason: HOSPADM

## 2023-08-08 RX ORDER — LIDOCAINE HYDROCHLORIDE 10 MG/ML
30 INJECTION, SOLUTION EPIDURAL; INFILTRATION; INTRACAUDAL; PERINEURAL ONCE
Status: COMPLETED | OUTPATIENT
Start: 2023-08-08 | End: 2023-08-08

## 2023-08-08 RX ORDER — DIAZEPAM 5 MG/1
10 TABLET ORAL ONCE
Status: DISCONTINUED | OUTPATIENT
Start: 2023-08-08 | End: 2023-08-12 | Stop reason: HOSPADM

## 2023-08-08 RX ORDER — DIAZEPAM 5 MG/1
5 TABLET ORAL ONCE
Status: DISCONTINUED | OUTPATIENT
Start: 2023-08-08 | End: 2023-08-12 | Stop reason: HOSPADM

## 2023-08-08 RX ADMIN — IOPAMIDOL 1 ML: 408 INJECTION, SOLUTION INTRATHECAL at 08:28

## 2023-08-08 RX ADMIN — LIDOCAINE HYDROCHLORIDE 15 ML: 10 INJECTION, SOLUTION EPIDURAL; INFILTRATION; INTRACAUDAL; PERINEURAL at 08:27

## 2023-08-08 RX ADMIN — DEXAMETHASONE SODIUM PHOSPHATE 10 MG: 10 INJECTION, SOLUTION INTRAMUSCULAR; INTRAVENOUS at 08:29

## 2023-08-08 ASSESSMENT — PAIN - FUNCTIONAL ASSESSMENT: PAIN_FUNCTIONAL_ASSESSMENT: 0-10

## 2023-08-08 ASSESSMENT — PAIN SCALES - GENERAL: PAINLEVEL_OUTOF10: 0

## 2023-08-08 NOTE — PROCEDURES
Sacroiliac Joint Block Procedure Note    Patient Name: Erika Stephen    Date of Procedure: August 8, 2023    Preoperative Diagnosis: Sacroiliac Dysfunction    Post Operative Diagnosis: same    Procedure: SI Joint Intraarticular and Extra-articular Injection - Bilateral    Consent: Informed consent was obtained prior to the procedure. The patient was given the opportunity to ask questions regarding the procedure and its associated risks. In addition to the potential risks associated with the procedure itself, the patient was informed both verbally and in writing of potential side effects of the use of glucocorticoids. The patient appeared to comprehend the informed consent and desired to have the procedure performed. Procedure: The patient was placed in the prone position on the flouroscopy table and the back was prepped and draped in the usual sterile manner. After local Lidocaine 1% infiltration, a #22 gauge spinal needle was then advanced to lie within the SI joint. The procedure was repeated for the contralateral SI joint. Yes a small amount of Isovue was used to confirm placement, no vascular uptake was identified. A total of 10 mg of Dexamethasone  and 5 ml of Lidocaine was introduced in and around the SI joint from two different needle placements. The injection area was cleaned and bandaids applied. No excessive bleeding was noted. Patient dressed and was discharged to home with instructions. Discussion:  The patient tolerated the procedure well. Patient reported tl-procedural pain on Visual Analog Scale:  pre-4; post-0. Extensive lumbar and SIJ hardware noted, clear view of SIJ space.           Jabari Tiwari MD  August 8, 2023

## 2023-08-08 NOTE — INTERVAL H&P NOTE
Update History & Physical    The patient's History and Physical of July 20, 2023 was reviewed. There was no change. The surgical site was confirmed by the patient and me. Plan: The risks, benefits, expected outcome, and alternative to the recommended procedure have been discussed with the patient. Patient understands and wants to proceed with the procedure.      Electronically signed by Ziggy García MD on 8/8/2023 at 8:16 AM

## 2023-08-24 ENCOUNTER — OFFICE VISIT (OUTPATIENT)
Age: 78
End: 2023-08-24
Payer: MEDICARE

## 2023-08-24 VITALS
HEIGHT: 61 IN | BODY MASS INDEX: 34.74 KG/M2 | HEART RATE: 82 BPM | WEIGHT: 184 LBS | OXYGEN SATURATION: 96 % | TEMPERATURE: 97.2 F

## 2023-08-24 DIAGNOSIS — M43.16 SPONDYLOLISTHESIS OF LUMBAR REGION: ICD-10-CM

## 2023-08-24 DIAGNOSIS — R26.9 GAIT ABNORMALITY: ICD-10-CM

## 2023-08-24 DIAGNOSIS — Z79.52 CURRENT CHRONIC USE OF SYSTEMIC STEROIDS: ICD-10-CM

## 2023-08-24 DIAGNOSIS — Z79.01 LONG TERM (CURRENT) USE OF ANTICOAGULANTS: ICD-10-CM

## 2023-08-24 DIAGNOSIS — Z98.890 STATUS POST LEFT FOOT SURGERY: ICD-10-CM

## 2023-08-24 DIAGNOSIS — M53.3 SACROILIAC JOINT DYSFUNCTION: Primary | ICD-10-CM

## 2023-08-24 PROCEDURE — 1090F PRES/ABSN URINE INCON ASSESS: CPT | Performed by: PHYSICAL MEDICINE & REHABILITATION

## 2023-08-24 PROCEDURE — 1036F TOBACCO NON-USER: CPT | Performed by: PHYSICAL MEDICINE & REHABILITATION

## 2023-08-24 PROCEDURE — 99213 OFFICE O/P EST LOW 20 MIN: CPT | Performed by: PHYSICAL MEDICINE & REHABILITATION

## 2023-08-24 PROCEDURE — 3017F COLORECTAL CA SCREEN DOC REV: CPT | Performed by: PHYSICAL MEDICINE & REHABILITATION

## 2023-08-24 PROCEDURE — G8400 PT W/DXA NO RESULTS DOC: HCPCS | Performed by: PHYSICAL MEDICINE & REHABILITATION

## 2023-08-24 PROCEDURE — G8427 DOCREV CUR MEDS BY ELIG CLIN: HCPCS | Performed by: PHYSICAL MEDICINE & REHABILITATION

## 2023-08-24 PROCEDURE — 1123F ACP DISCUSS/DSCN MKR DOCD: CPT | Performed by: PHYSICAL MEDICINE & REHABILITATION

## 2023-08-24 PROCEDURE — G8417 CALC BMI ABV UP PARAM F/U: HCPCS | Performed by: PHYSICAL MEDICINE & REHABILITATION

## 2023-08-24 NOTE — PROGRESS NOTES
IMPRESSION    Posterior fusion spanning L2-L5 without evidence of hardware complication. Disproportionate discogenic degenerative changes at L1-L2 and L5-S1. Foraminal narrowing is most advanced at L5-S1. Facet arthropathy is most advanced on the left at L5-S1. No high-grade central canal narrowing throughout  the lumbar spinal column. Please refer to the body of the report for a comprehensive segmental analysis of the lumbar spinal column. Pelvic inlet/outlet x-rays from 10/28/2021 was personally reviewed and demonstrated:    FINDINGS: Bilateral sacroiliac iliac joint effusion. Sacroiliac joints appear intact. Hardware appears intact. Lower lumbar spine fusion hardware. IMPRESSION     1. Bilateral sacroiliac joint fusion        Cervical spine 2V x-rays from 7/27/2020 were personally reviewed and demonstrated:  Diffuse degenerative changes with listhesis at C2-3. Written by Mayra Wolf, as dictated by Jasmyne Lucas MD.  I, Dr. Jasmyne Lucas confirm that all documentation is accurate.

## 2024-04-03 NOTE — H&P (VIEW-ONLY)
[facility]  [address], [address]  [place] [address]  Phone: [phone]  Fax: [phone]     Pt's YOB: 1945    ASSESSMENT   Laurie was seen today for lower back pain.    Diagnoses and all orders for this visit:    Sacroiliac joint dysfunction  -     Ambulatory Referral to Ortho Injection    Spondylolisthesis of lumbar region    Current chronic use of systemic steroids    Long term (current) use of anticoagulants         IMPRESSION AND PLAN:  Laurie Hobson is a 78 y.o. female with history of lumbar pain and presents to the office today for follow up.  Pt continues to complain of pain in the lumbar region across the left lateral hip and into the posterior aspect of the left leg.  Pt reports improvement with the bilateral SI joint injection administered on 07/31/2023 by Dr. Duarte. She has a hx of PMR so she remains on a low dose prednisone at 2 mg daily and Eliquis.     Ms. Hobson has a reminder for a \"due or due soon\" health maintenance. I have asked that she contact her primary care provider, Selma Angel MD, for follow-up on this health maintenance.   demonstrated consistency with prescribing.   Pt was scheduled for bilateral SIJ injections.  Pt is not a candidate for NSAIDs due to use of Eliquis  Return in about 3 months (around 7/4/2024) for follow up.        HISTORY OF PRESENT ILLNESS:  Laurie Hobson is a 78 y.o. RHD female with history of lumbar spondylolisthesis, sacroiliac joint dysfunction, and gait abnormality and presents to the office today for follow up. At last OV, pt was advised to continue with HEP.    Patient presents today continuing to complain of pain in the lumbar region across the left lateral hip and into the posterior aspect of the left leg. She reports that hse has had multiple right shoulder surgeries.     She is taking Cymbalta 60 mg and Tylenol 500 mg. She uses Voltaren 1% gel.     Per last office note,

## 2024-04-03 NOTE — PROGRESS NOTES
[facility]  [address], [address]  [place] [address]  Phone: [phone]  Fax: [phone]     Pt's YOB: 1945    ASSESSMENT   Laurie was seen today for lower back pain.    Diagnoses and all orders for this visit:    Sacroiliac joint dysfunction  -     Ambulatory Referral to Ortho Injection    Spondylolisthesis of lumbar region    Current chronic use of systemic steroids    Long term (current) use of anticoagulants         IMPRESSION AND PLAN:  Laurie Hobson is a 78 y.o. female with history of lumbar pain and presents to the office today for follow up.  Pt continues to complain of pain in the lumbar region across the left lateral hip and into the posterior aspect of the left leg.  Pt reports improvement with the bilateral SI joint injection administered on 07/31/2023 by Dr. Duarte. She has a hx of PMR so she remains on a low dose prednisone at 2 mg daily and Eliquis.     Ms. Hobson has a reminder for a \"due or due soon\" health maintenance. I have asked that she contact her primary care provider, Selma Angel MD, for follow-up on this health maintenance.   demonstrated consistency with prescribing.   Pt was scheduled for bilateral SIJ injections.  Pt is not a candidate for NSAIDs due to use of Eliquis  Return in about 3 months (around 7/4/2024) for follow up.        HISTORY OF PRESENT ILLNESS:  Laurie Hobson is a 78 y.o. RHD female with history of lumbar spondylolisthesis, sacroiliac joint dysfunction, and gait abnormality and presents to the office today for follow up. At last OV, pt was advised to continue with HEP.    Patient presents today continuing to complain of pain in the lumbar region across the left lateral hip and into the posterior aspect of the left leg. She reports that hse has had multiple right shoulder surgeries.     She is taking Cymbalta 60 mg and Tylenol 500 mg. She uses Voltaren 1% gel.     Per last office note,

## 2024-04-04 ENCOUNTER — OFFICE VISIT (OUTPATIENT)
Age: 79
End: 2024-04-04
Payer: MEDICARE

## 2024-04-04 VITALS
TEMPERATURE: 97.2 F | HEIGHT: 61 IN | BODY MASS INDEX: 33.04 KG/M2 | HEART RATE: 96 BPM | WEIGHT: 175 LBS | OXYGEN SATURATION: 96 %

## 2024-04-04 DIAGNOSIS — M53.3 SACROILIAC JOINT DYSFUNCTION: Primary | ICD-10-CM

## 2024-04-04 DIAGNOSIS — M43.16 SPONDYLOLISTHESIS OF LUMBAR REGION: ICD-10-CM

## 2024-04-04 DIAGNOSIS — Z79.52 CURRENT CHRONIC USE OF SYSTEMIC STEROIDS: ICD-10-CM

## 2024-04-04 DIAGNOSIS — Z79.01 LONG TERM (CURRENT) USE OF ANTICOAGULANTS: ICD-10-CM

## 2024-04-04 PROCEDURE — G8417 CALC BMI ABV UP PARAM F/U: HCPCS | Performed by: PHYSICAL MEDICINE & REHABILITATION

## 2024-04-04 PROCEDURE — 99213 OFFICE O/P EST LOW 20 MIN: CPT | Performed by: PHYSICAL MEDICINE & REHABILITATION

## 2024-04-04 PROCEDURE — 1036F TOBACCO NON-USER: CPT | Performed by: PHYSICAL MEDICINE & REHABILITATION

## 2024-04-04 PROCEDURE — 1123F ACP DISCUSS/DSCN MKR DOCD: CPT | Performed by: PHYSICAL MEDICINE & REHABILITATION

## 2024-04-04 PROCEDURE — G8400 PT W/DXA NO RESULTS DOC: HCPCS | Performed by: PHYSICAL MEDICINE & REHABILITATION

## 2024-04-04 PROCEDURE — 1090F PRES/ABSN URINE INCON ASSESS: CPT | Performed by: PHYSICAL MEDICINE & REHABILITATION

## 2024-04-04 PROCEDURE — G8427 DOCREV CUR MEDS BY ELIG CLIN: HCPCS | Performed by: PHYSICAL MEDICINE & REHABILITATION

## 2024-04-04 RX ORDER — ROMOSOZUMAB-AQQG 105 MG/1.17ML
210 INJECTION, SOLUTION SUBCUTANEOUS ONCE
COMMUNITY

## 2024-04-04 RX ORDER — DOCUSATE SODIUM 100 MG/1
100 CAPSULE, LIQUID FILLED ORAL 2 TIMES DAILY PRN
COMMUNITY
Start: 2024-01-03 | End: 2024-04-04

## 2024-04-04 RX ORDER — CELECOXIB 100 MG/1
100 CAPSULE ORAL 2 TIMES DAILY
COMMUNITY
Start: 2024-01-03 | End: 2024-04-04

## 2024-04-23 ENCOUNTER — HOSPITAL ENCOUNTER (OUTPATIENT)
Facility: HOSPITAL | Age: 79
Discharge: HOME | End: 2024-04-26
Payer: MEDICARE

## 2024-04-23 VITALS
HEART RATE: 67 BPM | TEMPERATURE: 98.3 F | SYSTOLIC BLOOD PRESSURE: 130 MMHG | RESPIRATION RATE: 16 BRPM | DIASTOLIC BLOOD PRESSURE: 71 MMHG | OXYGEN SATURATION: 95 %

## 2024-04-23 PROCEDURE — 27096 INJECT SACROILIAC JOINT: CPT | Performed by: PHYSICAL MEDICINE & REHABILITATION

## 2024-04-23 PROCEDURE — 6360000004 HC RX CONTRAST MEDICATION: Performed by: PHYSICAL MEDICINE & REHABILITATION

## 2024-04-23 PROCEDURE — 27096 INJECT SACROILIAC JOINT: CPT

## 2024-04-23 PROCEDURE — 2500000003 HC RX 250 WO HCPCS: Performed by: PHYSICAL MEDICINE & REHABILITATION

## 2024-04-23 PROCEDURE — 6360000002 HC RX W HCPCS: Performed by: PHYSICAL MEDICINE & REHABILITATION

## 2024-04-23 RX ORDER — DEXAMETHASONE SODIUM PHOSPHATE 10 MG/ML
10 INJECTION, SOLUTION INTRAMUSCULAR; INTRAVENOUS ONCE
Status: COMPLETED | OUTPATIENT
Start: 2024-04-23 | End: 2024-04-23

## 2024-04-23 RX ORDER — DIAZEPAM 5 MG/1
10 TABLET ORAL ONCE
Status: DISCONTINUED | OUTPATIENT
Start: 2024-04-23 | End: 2024-04-27 | Stop reason: HOSPADM

## 2024-04-23 RX ORDER — IOPAMIDOL 408 MG/ML
4 INJECTION, SOLUTION INTRATHECAL
Status: COMPLETED | OUTPATIENT
Start: 2024-04-23 | End: 2024-04-23

## 2024-04-23 RX ORDER — LIDOCAINE HYDROCHLORIDE 10 MG/ML
30 INJECTION, SOLUTION EPIDURAL; INFILTRATION; INTRACAUDAL; PERINEURAL ONCE
Status: COMPLETED | OUTPATIENT
Start: 2024-04-23 | End: 2024-04-23

## 2024-04-23 RX ORDER — DIAZEPAM 5 MG/1
5 TABLET ORAL ONCE
Status: DISCONTINUED | OUTPATIENT
Start: 2024-04-23 | End: 2024-04-27 | Stop reason: HOSPADM

## 2024-04-23 RX ORDER — DIAZEPAM 5 MG/1
2.5 TABLET ORAL ONCE
Status: DISCONTINUED | OUTPATIENT
Start: 2024-04-23 | End: 2024-04-27 | Stop reason: HOSPADM

## 2024-04-23 RX ADMIN — LIDOCAINE HYDROCHLORIDE 15 ML: 10 INJECTION, SOLUTION EPIDURAL; INFILTRATION; INTRACAUDAL; PERINEURAL at 09:43

## 2024-04-23 RX ADMIN — DEXAMETHASONE SODIUM PHOSPHATE 10 MG: 10 INJECTION INTRAMUSCULAR; INTRAVENOUS at 09:45

## 2024-04-23 RX ADMIN — IOPAMIDOL 1 ML: 408 INJECTION, SOLUTION INTRATHECAL at 09:45

## 2024-04-23 ASSESSMENT — PAIN SCALES - GENERAL: PAINLEVEL_OUTOF10: 0

## 2024-04-23 ASSESSMENT — PAIN DESCRIPTION - DESCRIPTORS: DESCRIPTORS: ACHING

## 2024-04-23 ASSESSMENT — PAIN - FUNCTIONAL ASSESSMENT: PAIN_FUNCTIONAL_ASSESSMENT: 0-10

## 2024-04-23 NOTE — INTERVAL H&P NOTE
Update History & Physical    The patient's History and Physical of April 4, 2024 was reviewed with the patient. There was no change. The surgical site was confirmed by the patient and me. Spoke to patient regarding BL SIJ fusion. Reports that periodic injections q6-9 months are beneficial. Additional hx of PMR, injections approved by treating MD.    Plan: The risks, benefits, expected outcome, and alternative to the recommended procedure have been discussed with the patient. Patient understands and wants to proceed with the procedure.     Electronically signed by WENCESLAO JARA MD on 4/23/2024 at 9:33 AM

## 2024-04-23 NOTE — PROCEDURES
Sacroiliac Joint Block Procedure Note    Patient Name: Laurie Hobson    Date of Procedure: April 23, 2024    Preoperative Diagnosis: Sacroiliac Dysfunction    Post Operative Diagnosis: same    Procedure: SI Joint Intraarticular and Extra-articular Injection - Bilateral    Consent: Informed consent was obtained prior to the procedure. The patient was given the opportunity to ask questions regarding the procedure and its associated risks. In addition to the potential risks associated with the procedure itself, the patient was informed both verbally and in writing of potential side effects of the use of glucocorticoids. The patient appeared to comprehend the informed consent and desired to have the procedure performed.    Procedure: The patient was placed in the prone position on the flouroscopy table and the back was prepped and draped in the usual sterile manner. After local Lidocaine 1% infiltration, a #22 gauge spinal needle was then advanced to lie within the SI joint. The procedure was repeated for the contralateral SI joint.Yes a small amount of Isovue was used to confirm placement, no vascular uptake was identified.     A total of 10 mg of Dexamethasone  and 5 ml of Lidocaine was introduced in and around the SI joint from two different needle placements.     The injection area was cleaned and bandaids applied. No excessive bleeding was noted. Patient dressed and was discharged to home with instructions.    Discussion:  The patient tolerated the procedure well.Patient reported tl-procedural pain on Visual Analog Scale:  pre-5; post-0.  SI joint hardware noted bilaterally.  Good visualization of joint space.          WENCESLAO JARA MD  April 23, 2024

## 2024-05-30 ENCOUNTER — OFFICE VISIT (OUTPATIENT)
Age: 79
End: 2024-05-30
Payer: MEDICARE

## 2024-05-30 VITALS
BODY MASS INDEX: 33.42 KG/M2 | WEIGHT: 177 LBS | OXYGEN SATURATION: 96 % | HEIGHT: 61 IN | TEMPERATURE: 97.3 F | HEART RATE: 74 BPM

## 2024-05-30 DIAGNOSIS — M53.3 SACROCOCCYGEAL DISORDERS, NOT ELSEWHERE CLASSIFIED: ICD-10-CM

## 2024-05-30 DIAGNOSIS — M48.061 SPINAL STENOSIS, LUMBAR REGION WITHOUT NEUROGENIC CLAUDICATION: Primary | ICD-10-CM

## 2024-05-30 DIAGNOSIS — R26.9 GAIT ABNORMALITY: ICD-10-CM

## 2024-05-30 DIAGNOSIS — Z98.1 S/P LUMBAR SPINAL FUSION: ICD-10-CM

## 2024-05-30 DIAGNOSIS — Z79.01 LONG TERM (CURRENT) USE OF ANTICOAGULANTS: ICD-10-CM

## 2024-05-30 DIAGNOSIS — M43.16 SPONDYLOLISTHESIS OF LUMBAR REGION: ICD-10-CM

## 2024-05-30 DIAGNOSIS — M35.3 PMR (POLYMYALGIA RHEUMATICA) (HCC): ICD-10-CM

## 2024-05-30 DIAGNOSIS — M25.512 LEFT SHOULDER PAIN, UNSPECIFIED CHRONICITY: ICD-10-CM

## 2024-05-30 DIAGNOSIS — M47.816 LUMBAR FACET ARTHROPATHY: ICD-10-CM

## 2024-05-30 PROCEDURE — 1090F PRES/ABSN URINE INCON ASSESS: CPT | Performed by: PHYSICAL MEDICINE & REHABILITATION

## 2024-05-30 PROCEDURE — G8400 PT W/DXA NO RESULTS DOC: HCPCS | Performed by: PHYSICAL MEDICINE & REHABILITATION

## 2024-05-30 PROCEDURE — G8427 DOCREV CUR MEDS BY ELIG CLIN: HCPCS | Performed by: PHYSICAL MEDICINE & REHABILITATION

## 2024-05-30 PROCEDURE — 99214 OFFICE O/P EST MOD 30 MIN: CPT | Performed by: PHYSICAL MEDICINE & REHABILITATION

## 2024-05-30 PROCEDURE — G8417 CALC BMI ABV UP PARAM F/U: HCPCS | Performed by: PHYSICAL MEDICINE & REHABILITATION

## 2024-05-30 PROCEDURE — 1123F ACP DISCUSS/DSCN MKR DOCD: CPT | Performed by: PHYSICAL MEDICINE & REHABILITATION

## 2024-05-30 PROCEDURE — 1036F TOBACCO NON-USER: CPT | Performed by: PHYSICAL MEDICINE & REHABILITATION

## 2024-05-30 PROCEDURE — 72110 X-RAY EXAM L-2 SPINE 4/>VWS: CPT | Performed by: PHYSICAL MEDICINE & REHABILITATION

## 2024-05-30 RX ORDER — BUSPIRONE HYDROCHLORIDE 15 MG/1
15 TABLET ORAL 3 TIMES DAILY
COMMUNITY
Start: 2024-05-16

## 2024-05-30 NOTE — PROGRESS NOTES
follow up.        HISTORY OF PRESENT ILLNESS:  Laurie Hobson is a 78 y.o. RHD female with history of lumbar spondylolisthesis and sacroiliac joint dysfunction and presents to the office today for  follow up. At last OV, pt was scheduled for bilateral SIJ injections.    Patient presents today continuing to complain of  pain in the lumbar region across the left lateral hip and into the posterior aspect of the left leg. She reports benefit with bilateral SIJ injections on 24 by Dr. Divine Duarte MD. She state that her pain is at is least when she is standing and is at its worst when sitting. She does report weakness and has had extensive therapy.  She is in therapy for her shoulder.    She is currently in PT for her right shoulder secondary to her third shoulder replacement     She is taking Cymbalta 60 mg and Tylenol 500 mg. She uses Voltaren 1% gel. She is on Eliquis.     Per last office note, she has a hx of a SI fusion procedure with Dr. Xiang Womack an chronic SI pain. She has a hx of PMR so she remains on a low dose prednisone at 2 mg daily for PMR and Eliquis.      Pt at this time desires to proceed with lumbar MRI, right soulder x-rays, and .    Pain Scale: 3/10    PCP: Selma Angel MD         Diagnosis Date    Arthritis     Hypercholesterolemia     Hypertension     Ill-defined condition     osteopenia    Lower back pain     Migraine     Osteoporosis     PMR (polymyalgia rheumatica) (HCC)     Thyroid disease         Social History     Tobacco Use    Smoking status: Former     Current packs/day: 0.00     Average packs/day: 0.5 packs/day for 5.0 years (2.5 ttl pk-yrs)     Types: Cigarettes     Quit date: 1970     Years since quittin.4    Smokeless tobacco: Never   Substance Use Topics    Alcohol use: Not Currently     Alcohol/week: 5.0 standard drinks of alcohol     Types: 5 Drinks containing 0.5 oz of alcohol per week    Drug use: No          Current Outpatient Medications:     busPIRone

## 2024-07-02 ENCOUNTER — HOSPITAL ENCOUNTER (OUTPATIENT)
Facility: HOSPITAL | Age: 79
Discharge: HOME | End: 2024-07-05
Attending: PHYSICAL MEDICINE & REHABILITATION
Payer: MEDICARE

## 2024-07-02 DIAGNOSIS — M43.16 SPONDYLOLISTHESIS OF LUMBAR REGION: ICD-10-CM

## 2024-07-02 DIAGNOSIS — M53.3 SACROCOCCYGEAL DISORDERS, NOT ELSEWHERE CLASSIFIED: ICD-10-CM

## 2024-07-02 DIAGNOSIS — M48.061 SPINAL STENOSIS, LUMBAR REGION WITHOUT NEUROGENIC CLAUDICATION: ICD-10-CM

## 2024-07-02 PROCEDURE — A9577 INJ MULTIHANCE: HCPCS | Performed by: PHYSICAL MEDICINE & REHABILITATION

## 2024-07-02 PROCEDURE — 6360000004 HC RX CONTRAST MEDICATION: Performed by: PHYSICAL MEDICINE & REHABILITATION

## 2024-07-02 PROCEDURE — 72158 MRI LUMBAR SPINE W/O & W/DYE: CPT

## 2024-07-02 RX ADMIN — GADOBENATE DIMEGLUMINE 17 ML: 529 INJECTION, SOLUTION INTRAVENOUS at 10:43

## 2024-07-11 ENCOUNTER — OFFICE VISIT (OUTPATIENT)
Age: 79
End: 2024-07-11
Payer: MEDICARE

## 2024-07-11 VITALS — TEMPERATURE: 96 F | WEIGHT: 180 LBS | BODY MASS INDEX: 33.99 KG/M2 | HEIGHT: 61 IN

## 2024-07-11 DIAGNOSIS — M47.816 LUMBAR FACET ARTHROPATHY: Primary | ICD-10-CM

## 2024-07-11 DIAGNOSIS — Z98.1 S/P LUMBAR SPINAL FUSION: ICD-10-CM

## 2024-07-11 DIAGNOSIS — M35.3 PMR (POLYMYALGIA RHEUMATICA) (HCC): ICD-10-CM

## 2024-07-11 DIAGNOSIS — Z79.01 LONG TERM (CURRENT) USE OF ANTICOAGULANTS: ICD-10-CM

## 2024-07-11 DIAGNOSIS — M53.3 SACROILIAC JOINT DYSFUNCTION: ICD-10-CM

## 2024-07-11 DIAGNOSIS — M54.16 LUMBAR RADICULITIS: ICD-10-CM

## 2024-07-11 PROCEDURE — G8427 DOCREV CUR MEDS BY ELIG CLIN: HCPCS | Performed by: PHYSICAL MEDICINE & REHABILITATION

## 2024-07-11 PROCEDURE — 1036F TOBACCO NON-USER: CPT | Performed by: PHYSICAL MEDICINE & REHABILITATION

## 2024-07-11 PROCEDURE — 1090F PRES/ABSN URINE INCON ASSESS: CPT | Performed by: PHYSICAL MEDICINE & REHABILITATION

## 2024-07-11 PROCEDURE — 1123F ACP DISCUSS/DSCN MKR DOCD: CPT | Performed by: PHYSICAL MEDICINE & REHABILITATION

## 2024-07-11 PROCEDURE — G8400 PT W/DXA NO RESULTS DOC: HCPCS | Performed by: PHYSICAL MEDICINE & REHABILITATION

## 2024-07-11 PROCEDURE — 99214 OFFICE O/P EST MOD 30 MIN: CPT | Performed by: PHYSICAL MEDICINE & REHABILITATION

## 2024-07-11 PROCEDURE — G8417 CALC BMI ABV UP PARAM F/U: HCPCS | Performed by: PHYSICAL MEDICINE & REHABILITATION

## 2024-07-11 RX ORDER — ACETAMINOPHEN AND CODEINE PHOSPHATE 300; 30 MG/1; MG/1
1 TABLET ORAL EVERY 6 HOURS PRN
Qty: 28 TABLET | Refills: 0 | Status: SHIPPED | OUTPATIENT
Start: 2024-07-11 | End: 2024-07-18

## 2024-07-11 NOTE — PROGRESS NOTES
[facility]  [address], [address]  [place] [address]  Phone: [phone]  Fax: [phone]    Pt's YOB: 1945    ASSESSMENT   Laurie was seen today for follow-up.    Diagnoses and all orders for this visit:    Lumbar facet arthropathy  -     acetaminophen-codeine (TYLENOL/CODEINE #3) 300-30 MG per tablet; Take 1 tablet by mouth every 6 hours as needed for Pain for up to 7 days. Intended supply: 7 days. Take lowest dose possible to manage pain Max Daily Amount: 4 tablets    Lumbar radiculitis    S/P lumbar spinal fusion    Sacroiliac joint dysfunction    PMR (polymyalgia rheumatica) (HCC)    Long term (current) use of anticoagulants         IMPRESSION AND PLAN:  Laurie Hobson is a 78 y.o. RHD female with history of pain in the lumbar region, left hip pain, and LLE pain She is taking Cymbalta 60 mg and Tylenol 500 mg. She uses Voltaren 1% gel. Past SI injections have offered pain relief per the patient. Failed medications include Lyrica due to sedation.     1) Patient provided with a prescription for Tylenol #3  mg for pain, 1 po q6h.   2) MRI of the lumbar spine was reviewed with the patient at length.  3) Patient encouraged to use her SI exercises, especially in the mornings.  4) Ms. Hobson has a reminder for a \"due or due soon\" health maintenance. I have asked that she contact her primary care provider, Selma Angel MD, for follow-up on this health maintenance.  5)  demonstrated consistency with prescribing.   6) May consider Left L5 SNRB if lumbar radicular symptoms persist.  She is currently not able to come off of the Eliquis for 3 months due to recent  cardiac ablation.  Return in about 3 months (around 10/11/2024) for follow up 2-3 months.        HISTORY OF PRESENT ILLNESS:  Laurie Hobson is a 78 y.o. RHD female with history of Lumbar spondylolisthesis and sacroiliac joint dysfunction pain and presents to the office 
narrowing and mild bilateral foraminal  narrowing.       Electronically signed by Brent Bush           Written by Cholo Olvera, as dictated by Edilma Barton MD.  I, Dr. Edilma Barton confirm that all documentation is accurate.

## 2024-09-26 ENCOUNTER — OFFICE VISIT (OUTPATIENT)
Age: 79
End: 2024-09-26
Payer: MEDICARE

## 2024-09-26 VITALS
HEIGHT: 61 IN | WEIGHT: 178 LBS | TEMPERATURE: 98 F | HEART RATE: 72 BPM | BODY MASS INDEX: 33.61 KG/M2 | OXYGEN SATURATION: 98 %

## 2024-09-26 DIAGNOSIS — M53.3 SACROILIAC JOINT DYSFUNCTION: ICD-10-CM

## 2024-09-26 DIAGNOSIS — M47.816 LUMBAR FACET ARTHROPATHY: Primary | ICD-10-CM

## 2024-09-26 DIAGNOSIS — G89.4 CHRONIC PAIN SYNDROME: ICD-10-CM

## 2024-09-26 DIAGNOSIS — R26.9 GAIT ABNORMALITY: ICD-10-CM

## 2024-09-26 DIAGNOSIS — M35.3 PMR (POLYMYALGIA RHEUMATICA) (HCC): ICD-10-CM

## 2024-09-26 DIAGNOSIS — M48.061 SPINAL STENOSIS, LUMBAR REGION WITHOUT NEUROGENIC CLAUDICATION: ICD-10-CM

## 2024-09-26 DIAGNOSIS — Z79.01 LONG TERM (CURRENT) USE OF ANTICOAGULANTS: ICD-10-CM

## 2024-09-26 PROCEDURE — G8400 PT W/DXA NO RESULTS DOC: HCPCS | Performed by: PHYSICAL MEDICINE & REHABILITATION

## 2024-09-26 PROCEDURE — G8417 CALC BMI ABV UP PARAM F/U: HCPCS | Performed by: PHYSICAL MEDICINE & REHABILITATION

## 2024-09-26 PROCEDURE — 1123F ACP DISCUSS/DSCN MKR DOCD: CPT | Performed by: PHYSICAL MEDICINE & REHABILITATION

## 2024-09-26 PROCEDURE — 1090F PRES/ABSN URINE INCON ASSESS: CPT | Performed by: PHYSICAL MEDICINE & REHABILITATION

## 2024-09-26 PROCEDURE — G8427 DOCREV CUR MEDS BY ELIG CLIN: HCPCS | Performed by: PHYSICAL MEDICINE & REHABILITATION

## 2024-09-26 PROCEDURE — 1036F TOBACCO NON-USER: CPT | Performed by: PHYSICAL MEDICINE & REHABILITATION

## 2024-09-26 PROCEDURE — 99214 OFFICE O/P EST MOD 30 MIN: CPT | Performed by: PHYSICAL MEDICINE & REHABILITATION

## 2024-09-26 RX ORDER — PREDNISONE 1 MG/1
1 TABLET ORAL DAILY
COMMUNITY
Start: 2024-07-02

## 2024-09-26 RX ORDER — BENAZEPRIL HYDROCHLORIDE 10 MG/1
10 TABLET ORAL DAILY
COMMUNITY
Start: 2024-08-14

## 2024-09-26 RX ORDER — ACETAMINOPHEN AND CODEINE PHOSPHATE 300; 30 MG/1; MG/1
1 TABLET ORAL DAILY PRN
Qty: 30 TABLET | Refills: 2 | Status: SHIPPED | OUTPATIENT
Start: 2024-09-26 | End: 2024-12-25

## 2024-12-16 RX ORDER — THYROID 15 MG/1
15 TABLET ORAL DAILY
COMMUNITY

## 2024-12-16 NOTE — PERIOP NOTE
Instructions for your procedure at [facility]      Today's Date: 12/16/2024      Patient's Name: Laurie Hobson      Procedure Date: 12/30/2024        Please enter the main entrance of the hospital and check-in at the  located in the lobby.      Do NOT eat or drink anything, including candy, gum, or ice chips after midnight prior to your procedure, unless it is part of your prep.  Brush your teeth before coming to the hospital.You may swish with water, but do not swallow.  No smoking/Vaping/E-Cigarettes 24 hours prior to the day of procedure.  No alcohol 24 hours prior to the day of procedure.  No recreational drugs for one week prior to the day of procedure.  Bring Photo ID, Insurance information, and Co-pay if required on day of procedure.  Bring in pertinent legal documents, such as, Medical Power of , DNR, Advance Directive, etc.  Leave all other valuables, including money/purse, at home.  Remove jewelry, including ALL body piercings, nail polish, acrylic nails, and makeup (including mascara); no lotions, powders, deodorant, and/or perfume/cologne/after shave on the skin.  Glasses and dentures may be worn to the hospital.  They must be removed prior to procedure. Please bring case/container for glasses or dentures.  11. Contacts should not be worn on day of procedure.   12. Call the office ([phone]) if you have symptoms of a cold or illness within 24-48 hours prior to your procedure.   13. AN ADULT (relative or friend 18 years or older) MUST DRIVE YOU HOME AFTER YOUR PROCEDURE.   14. Please make arrangements for a responsible adult (18 years or older) to be with you for 24 hours after your procedure.   15. TWO VISITORS will be allowed in the waiting area during your procedure.       Special Instructions:      Bring list of CURRENT medications.  Follow instructions from the office regarding Bowel Prep, Vitamins, Iron, Blood Thinners, Insulin, Seizure, and Blood

## 2024-12-19 ENCOUNTER — OFFICE VISIT (OUTPATIENT)
Age: 79
End: 2024-12-19
Payer: MEDICARE

## 2024-12-19 VITALS — TEMPERATURE: 97 F | BODY MASS INDEX: 34.36 KG/M2 | HEIGHT: 61 IN | WEIGHT: 182 LBS

## 2024-12-19 DIAGNOSIS — M47.812 CERVICAL FACET JOINT SYNDROME: ICD-10-CM

## 2024-12-19 DIAGNOSIS — M50.30 DDD (DEGENERATIVE DISC DISEASE), CERVICAL: ICD-10-CM

## 2024-12-19 DIAGNOSIS — M47.816 LUMBAR FACET ARTHROPATHY: ICD-10-CM

## 2024-12-19 DIAGNOSIS — M53.3 SACROILIAC JOINT DYSFUNCTION: ICD-10-CM

## 2024-12-19 DIAGNOSIS — M35.3 PMR (POLYMYALGIA RHEUMATICA) (HCC): ICD-10-CM

## 2024-12-19 DIAGNOSIS — G89.4 CHRONIC PAIN SYNDROME: ICD-10-CM

## 2024-12-19 DIAGNOSIS — M54.2 CERVICAL PAIN: Primary | ICD-10-CM

## 2024-12-19 PROCEDURE — G8484 FLU IMMUNIZE NO ADMIN: HCPCS | Performed by: PHYSICAL MEDICINE & REHABILITATION

## 2024-12-19 PROCEDURE — 1125F AMNT PAIN NOTED PAIN PRSNT: CPT | Performed by: PHYSICAL MEDICINE & REHABILITATION

## 2024-12-19 PROCEDURE — 1036F TOBACCO NON-USER: CPT | Performed by: PHYSICAL MEDICINE & REHABILITATION

## 2024-12-19 PROCEDURE — 1090F PRES/ABSN URINE INCON ASSESS: CPT | Performed by: PHYSICAL MEDICINE & REHABILITATION

## 2024-12-19 PROCEDURE — G8417 CALC BMI ABV UP PARAM F/U: HCPCS | Performed by: PHYSICAL MEDICINE & REHABILITATION

## 2024-12-19 PROCEDURE — G8400 PT W/DXA NO RESULTS DOC: HCPCS | Performed by: PHYSICAL MEDICINE & REHABILITATION

## 2024-12-19 PROCEDURE — 1123F ACP DISCUSS/DSCN MKR DOCD: CPT | Performed by: PHYSICAL MEDICINE & REHABILITATION

## 2024-12-19 PROCEDURE — 1160F RVW MEDS BY RX/DR IN RCRD: CPT | Performed by: PHYSICAL MEDICINE & REHABILITATION

## 2024-12-19 PROCEDURE — 99214 OFFICE O/P EST MOD 30 MIN: CPT | Performed by: PHYSICAL MEDICINE & REHABILITATION

## 2024-12-19 PROCEDURE — 72040 X-RAY EXAM NECK SPINE 2-3 VW: CPT | Performed by: PHYSICAL MEDICINE & REHABILITATION

## 2024-12-19 PROCEDURE — 1159F MED LIST DOCD IN RCRD: CPT | Performed by: PHYSICAL MEDICINE & REHABILITATION

## 2024-12-19 PROCEDURE — G8427 DOCREV CUR MEDS BY ELIG CLIN: HCPCS | Performed by: PHYSICAL MEDICINE & REHABILITATION

## 2024-12-19 RX ORDER — ACETAMINOPHEN AND CODEINE PHOSPHATE 300; 30 MG/1; MG/1
1 TABLET ORAL DAILY PRN
Qty: 30 TABLET | Refills: 2 | Status: SHIPPED | OUTPATIENT
Start: 2025-01-13 | End: 2025-04-13

## 2024-12-19 NOTE — PROGRESS NOTES
Posterior fusion. Minimal retrolisthesis. Disc bulge with endplate  spurring and bilateral facet arthropathy. Mild spinal canal narrowing and mild  bilateral foraminal narrowing.     -L3-4: Posterior fusion and posterior decompression. No spinal canal narrowing.  Mild right and no left foraminal narrowing.     -L4-5: Posterior spinal fusion and posterior decompression. No spinal canal or  foraminal narrowing.     -L5-S1: Mild grade 1 anterolisthesis. Diffuse disc bulge. Severe bilateral facet  arthropathy with bony hypertrophy and thickening of the ligamentum flavum. No  spinal canal narrowing and mild bilateral foraminal narrowing.     IMPRESSION:  Posterior spinal fusion from L2-L5 with posterior decompression from L3-L5.     Degenerative changes in the lumbar spine as detailed above, most pronounced at  L2-L3 where there is mild spinal canal narrowing and mild bilateral foraminal  narrowing.     Lumbar 4V x-rays 05/31/2024 were personally reviewed and demonstrated:  Levoscoliosis, L2-L5 fusion, DDD L1/2, lumbar facets, bilateral SI joint fusion, hardware intact     Lumbar CT from 9/14/2020 was personally reviewed and demonstrated:  FINDINGS:     The imaged lung bases are clear. No retroperitoneal lymphadenopathy. Imaged portions of the kidneys, spleen, adrenal glands, and liver are normal.    Spinal cord terminates at the L1 level.    Posterior fusion spanning  L2-L5 with bilateral pedicle screws, interlocking vertical bars and multilevel laminotomies. No perihardware lucency to suggest loosening or infection. The left L5 pedicle screw is with its tip just outside the anterior cortex of L5. No fluid collection  within the surgical bed.    Vertebral body heights are preserved.    At T10-T11, there is a mild broad-based disc osteophyte complex without high-grade central canal or foraminal narrowing.    At T11-T12, there is a mild broad-based  disc osteophyte complex without high-grade central canal or foraminal

## 2024-12-27 ENCOUNTER — ANESTHESIA EVENT (OUTPATIENT)
Facility: HOSPITAL | Age: 79
End: 2024-12-27
Payer: MEDICARE

## 2024-12-30 ENCOUNTER — HOSPITAL ENCOUNTER (OUTPATIENT)
Facility: HOSPITAL | Age: 79
Setting detail: OUTPATIENT SURGERY
Discharge: HOME | End: 2024-12-30
Attending: INTERNAL MEDICINE | Admitting: INTERNAL MEDICINE
Payer: MEDICARE

## 2024-12-30 ENCOUNTER — ANESTHESIA (OUTPATIENT)
Facility: HOSPITAL | Age: 79
End: 2024-12-30
Payer: MEDICARE

## 2024-12-30 VITALS
DIASTOLIC BLOOD PRESSURE: 55 MMHG | HEART RATE: 69 BPM | HEIGHT: 62 IN | RESPIRATION RATE: 17 BRPM | SYSTOLIC BLOOD PRESSURE: 110 MMHG | TEMPERATURE: 97.8 F | WEIGHT: 172.1 LBS | OXYGEN SATURATION: 99 % | BODY MASS INDEX: 31.67 KG/M2

## 2024-12-30 PROCEDURE — 3600007513: Performed by: INTERNAL MEDICINE

## 2024-12-30 PROCEDURE — 7100000000 HC PACU RECOVERY - FIRST 15 MIN: Performed by: INTERNAL MEDICINE

## 2024-12-30 PROCEDURE — 2580000003 HC RX 258: Performed by: INTERNAL MEDICINE

## 2024-12-30 PROCEDURE — 88342 IMHCHEM/IMCYTCHM 1ST ANTB: CPT

## 2024-12-30 PROCEDURE — 3700000001 HC ADD 15 MINUTES (ANESTHESIA): Performed by: INTERNAL MEDICINE

## 2024-12-30 PROCEDURE — 88305 TISSUE EXAM BY PATHOLOGIST: CPT

## 2024-12-30 PROCEDURE — 6360000002 HC RX W HCPCS: Performed by: NURSE ANESTHETIST, CERTIFIED REGISTERED

## 2024-12-30 PROCEDURE — 3700000000 HC ANESTHESIA ATTENDED CARE: Performed by: INTERNAL MEDICINE

## 2024-12-30 PROCEDURE — 3600007503: Performed by: INTERNAL MEDICINE

## 2024-12-30 PROCEDURE — 2709999900 HC NON-CHARGEABLE SUPPLY: Performed by: INTERNAL MEDICINE

## 2024-12-30 PROCEDURE — 7100000010 HC PHASE II RECOVERY - FIRST 15 MIN: Performed by: INTERNAL MEDICINE

## 2024-12-30 RX ORDER — LIDOCAINE HYDROCHLORIDE 20 MG/ML
INJECTION, SOLUTION EPIDURAL; INFILTRATION; INTRACAUDAL; PERINEURAL
Status: DISCONTINUED | OUTPATIENT
Start: 2024-12-30 | End: 2024-12-30 | Stop reason: SDUPTHER

## 2024-12-30 RX ORDER — SODIUM CHLORIDE 9 MG/ML
INJECTION, SOLUTION INTRAVENOUS CONTINUOUS
Status: DISCONTINUED | OUTPATIENT
Start: 2024-12-30 | End: 2024-12-30 | Stop reason: HOSPADM

## 2024-12-30 RX ORDER — ONDANSETRON 2 MG/ML
4 INJECTION INTRAMUSCULAR; INTRAVENOUS
Status: CANCELLED | OUTPATIENT
Start: 2024-12-30 | End: 2024-12-31

## 2024-12-30 RX ORDER — PROPOFOL 10 MG/ML
INJECTION, EMULSION INTRAVENOUS
Status: DISCONTINUED | OUTPATIENT
Start: 2024-12-30 | End: 2024-12-30 | Stop reason: SDUPTHER

## 2024-12-30 RX ORDER — SODIUM CHLORIDE 0.9 % (FLUSH) 0.9 %
5-40 SYRINGE (ML) INJECTION PRN
Status: CANCELLED | OUTPATIENT
Start: 2024-12-30

## 2024-12-30 RX ORDER — SODIUM CHLORIDE 0.9 % (FLUSH) 0.9 %
5-40 SYRINGE (ML) INJECTION EVERY 12 HOURS SCHEDULED
Status: CANCELLED | OUTPATIENT
Start: 2024-12-30

## 2024-12-30 RX ORDER — FENTANYL CITRATE 50 UG/ML
25 INJECTION, SOLUTION INTRAMUSCULAR; INTRAVENOUS EVERY 5 MIN PRN
Status: CANCELLED | OUTPATIENT
Start: 2024-12-30

## 2024-12-30 RX ORDER — LIDOCAINE HYDROCHLORIDE 10 MG/ML
1 INJECTION, SOLUTION EPIDURAL; INFILTRATION; INTRACAUDAL; PERINEURAL
Status: DISCONTINUED | OUTPATIENT
Start: 2024-12-30 | End: 2024-12-30 | Stop reason: HOSPADM

## 2024-12-30 RX ORDER — SODIUM CHLORIDE 9 MG/ML
INJECTION, SOLUTION INTRAVENOUS PRN
Status: CANCELLED | OUTPATIENT
Start: 2024-12-30

## 2024-12-30 RX ORDER — NALOXONE HYDROCHLORIDE 0.4 MG/ML
INJECTION, SOLUTION INTRAMUSCULAR; INTRAVENOUS; SUBCUTANEOUS PRN
Status: CANCELLED | OUTPATIENT
Start: 2024-12-30

## 2024-12-30 RX ORDER — DIPHENHYDRAMINE HYDROCHLORIDE 50 MG/ML
12.5 INJECTION INTRAMUSCULAR; INTRAVENOUS
Status: CANCELLED | OUTPATIENT
Start: 2024-12-30 | End: 2024-12-31

## 2024-12-30 RX ORDER — SODIUM CHLORIDE, SODIUM LACTATE, POTASSIUM CHLORIDE, CALCIUM CHLORIDE 600; 310; 30; 20 MG/100ML; MG/100ML; MG/100ML; MG/100ML
INJECTION, SOLUTION INTRAVENOUS CONTINUOUS
Status: DISCONTINUED | OUTPATIENT
Start: 2024-12-30 | End: 2024-12-30 | Stop reason: HOSPADM

## 2024-12-30 RX ADMIN — PROPOFOL 180 MCG/KG/MIN: 10 INJECTION, EMULSION INTRAVENOUS at 10:58

## 2024-12-30 RX ADMIN — LIDOCAINE HYDROCHLORIDE 20 MG: 20 INJECTION, SOLUTION EPIDURAL; INFILTRATION; INTRACAUDAL; PERINEURAL at 10:58

## 2024-12-30 RX ADMIN — SODIUM CHLORIDE: 9 INJECTION, SOLUTION INTRAVENOUS at 09:38

## 2024-12-30 ASSESSMENT — PAIN - FUNCTIONAL ASSESSMENT
PAIN_FUNCTIONAL_ASSESSMENT: 0-10

## 2024-12-30 NOTE — ANESTHESIA POSTPROCEDURE EVALUATION
Department of Anesthesiology  Postprocedure Note    Patient: Laurie Hobson  MRN: [id number]  YOB: 1945  Date of evaluation: 12/30/2024    Procedure Summary       Date: 12/30/24 Room / Location: [facility] ENDO 02 / [facility] ENDOSCOPY    Anesthesia Start: 1049 Anesthesia Stop: 1115    Procedures:       ESOPHAGOGASTRODUODENOSCOPY w/Bx (Upper GI Region)      COLONOSCOPY DIAGNOSTIC w/Polyp (Abdomen) Diagnosis:       Heartburn      Nausea      Abdominal pain, generalized      Pain, abdominal, RUQ      Diverticulosis      Family history of colon cancer      (Heartburn [R12])      (Nausea [R11.0])      (Abdominal pain, generalized [R10.84])      (Pain, abdominal, RUQ [R10.11])      (Diverticulosis [K57.90])      (Family history of colon cancer [Z80.0])    Surgeons: Forrest Mi MD Responsible Provider: Kassandra Aguirre MD    Anesthesia Type: MAC ASA Status: 3            Anesthesia Type: MAC    Jignesh Phase I: Jignesh Score: 9    Jignesh Phase II: Jignesh Score: 10    Anesthesia Post Evaluation    Patient location during evaluation: bedside  Patient participation: complete - patient participated  Airway patency: patent  Cardiovascular status: hemodynamically stable  Respiratory status: acceptable  Hydration status: stable    No notable events documented.

## 2024-12-30 NOTE — H&P
[phone]    GASTROENTEROLOGY Pre-Procedure H and P      Impression/Plan:   1. This patient is consented for an EGD and colonoscopy for Abdominal pain, nausea, family history of colon cancer       Chief Complaint: Abdominal pain, nausea, family history of colon cancer    HPI:  Laurie Hobson is a 79 y.o. female who is having an EGD and colonoscopy for Abdominal pain, nausea, family history of colon cancer  PMH:   Past Medical History:   Diagnosis Date    Arthritis     Hypercholesterolemia     Hypertension     Ill-defined condition     osteopenia    Lower back pain     Migraine     Multifocal atrial tachycardia (HCC)     Osteoporosis     Paroxysmal A-fib (HCC)     PMR (polymyalgia rheumatica) (HCC)     Thyroid disease        PSH:   Past Surgical History:   Procedure Laterality Date    ABDOMEN SURGERY      ANESTH,SURGERY OF SHOULDER      ANKLE FRACTURE SURGERY Right     APPENDECTOMY      BACK SURGERY  03/16/16    TLIF L2/3/4/5-Dr. Swanson    BREAST REDUCTION SURGERY      BUNIONECTOMY      CARPAL TUNNEL RELEASE      HUMERUS FRACTURE SURGERY  Dominic 3 2024    HYSTERECTOMY (CERVIX STATUS UNKNOWN)      JOINT REPLACEMENT      MOHS SURGERY Right     NEUROLOGICAL SURGERY      block injection 11/3/15    ORTHOPEDIC SURGERY Right     carpal tunnel release    ORTHOPEDIC SURGERY      \"surgery for hammertoes\"    ORTHOPEDIC SURGERY Left 08/2016    carpal tunnel release    ORTHOPEDIC SURGERY Right 2015    rotator cuff repair    OTHER SURGICAL HISTORY Bilateral     quadricept tendon repair    OTHER SURGICAL HISTORY      Cardiac ablation 6/13/2024    SHOULDER SURGERY      SPINAL FUSION         Social HX:   Social History     Socioeconomic History    Marital status:      Spouse name: Not on file    Number of children: Not on file    Years of education: Not on file    Highest education level: Not on file   Occupational History    Not on file   Tobacco Use    Smoking status: Former     Current packs/day: 0.00     Average

## 2024-12-30 NOTE — DISCHARGE INSTRUCTIONS
legal documents or make major decisions until the medicine wears off and you can think clearly. The anesthesia can make it hard for you to fully understand what you are agreeing to.   Follow-up care is a key part of your treatment and safety. Be sure to make and go to all appointments, and call your doctor if you are having problems. It's also a good idea to know your test results and keep a list of the medicines you take.  When should you call for help?   Call 911 anytime you think you may need emergency care. For example, call if:    You passed out (lost consciousness).     You pass maroon or bloody stools.     You have trouble breathing.   Call your doctor now or seek immediate medical care if:    You have pain that does not get better after you take pain medicine.     You are sick to your stomach or cannot drink fluids.     You have new or worse belly pain.     You have blood in your stools.     You have a fever.     You cannot pass stools or gas.   Watch closely for changes in your health, and be sure to contact your doctor if you have any problems.  Where can you learn more?  Go to https://www.[employer].net/patientEd and enter E264 to learn more about \"Colonoscopy: What to Expect at Home.\"  Current as of: October 25, 2023  Content Version: 14.2  © 2024 "[employer].   Care instructions adapted under license by [employer]. If you have questions about a medical condition or this instruction, always ask your healthcare professional. [employer] disclaims any warranty or liability for your use of this information.      
Orthopedic

## 2024-12-30 NOTE — ANESTHESIA PRE PROCEDURE
Department of Anesthesiology  Preprocedure Note       Name:  Laurie Hobson   Age:  79 y.o.  :  1945                                          MRN:  [id number]         Date:  2024      Surgeon: Surgeon(s):  Forrets Mi MD    Procedure: Procedure(s):  ESOPHAGOGASTRODUODENOSCOPY  COLONOSCOPY DIAGNOSTIC    Medications prior to admission:   Prior to Admission medications    Medication Sig Start Date End Date Taking? Authorizing Provider   acetaminophen-codeine (TYLENOL #3) 300-30 MG per tablet Take 1 tablet by mouth daily as needed for Pain for up to 90 days. Max Daily Amount: 1 tablet 25 Yes Edilma Barton MD   thyroid (ARMOUR THYROID) 15 MG tablet Take 1 tablet by mouth daily   Yes Pham Butt MD   benazepril (LOTENSIN) 10 MG tablet Take 1 tablet by mouth daily 24  Yes ProviderPham MD   predniSONE 2 MG TBEC Take 1 mg by mouth daily 24  Yes ProviderPham MD   busPIRone (BUSPAR) 15 MG tablet Take 15 mg by mouth as needed 24  Yes Provider, MD Pham   romosozumab-aqqg (EVENITY) 105 MG/1.17ML SOSY injection Inject 2.34 mLs into the skin once monthly   Yes ProviderPham MD   Calcium Citrate-Vitamin D (CITRACAL + D PO) Take 1,000 mg by mouth daily   Yes Provider, MD Pham   cycloSPORINE (RESTASIS) 0.05 % ophthalmic emulsion Apply 1 drop to eye 2 times daily   Yes Automatic Reconciliation, Ar   DULoxetine (CYMBALTA) 60 MG extended release capsule Take 1 capsule by mouth daily 17  Yes Automatic Reconciliation, Ar   metoprolol tartrate (LOPRESSOR) 25 MG tablet TAKE 1 TABLET TWICE A DAY 3/2/21  Yes Automatic Reconciliation, Ar   rosuvastatin (CRESTOR) 20 MG tablet Take 1 tablet by mouth at bedtime   Yes Automatic Reconciliation, Ar   thyroid (ARMOUR) 60 MG tablet Take 1 tablet by mouth daily One 60 mg plus a 15 mg tab daily   Yes Automatic Reconciliation, Ar   acetaminophen (TYLENOL) 500 MG tablet Take 1 tablet by mouth as needed

## 2025-01-07 ENCOUNTER — HOSPITAL ENCOUNTER (OUTPATIENT)
Facility: HOSPITAL | Age: 80
Discharge: HOME | End: 2025-01-10
Attending: PHYSICAL MEDICINE & REHABILITATION
Payer: MEDICARE

## 2025-01-07 DIAGNOSIS — M54.2 CERVICAL PAIN: ICD-10-CM

## 2025-01-07 DIAGNOSIS — G89.4 CHRONIC PAIN SYNDROME: ICD-10-CM

## 2025-01-07 PROCEDURE — 72141 MRI NECK SPINE W/O DYE: CPT

## 2025-01-27 DIAGNOSIS — M50.30 DDD (DEGENERATIVE DISC DISEASE), CERVICAL: ICD-10-CM

## 2025-01-27 DIAGNOSIS — M53.3 SACROILIAC JOINT DYSFUNCTION: ICD-10-CM

## 2025-01-27 DIAGNOSIS — M47.816 LUMBAR FACET ARTHROPATHY: ICD-10-CM

## 2025-01-27 DIAGNOSIS — G89.4 CHRONIC PAIN SYNDROME: ICD-10-CM

## 2025-01-28 RX ORDER — ACETAMINOPHEN AND CODEINE PHOSPHATE 300; 30 MG/1; MG/1
1 TABLET ORAL DAILY PRN
Qty: 30 TABLET | Refills: 2 | OUTPATIENT
Start: 2025-01-28 | End: 2025-04-28

## 2025-01-30 DIAGNOSIS — M50.30 DDD (DEGENERATIVE DISC DISEASE), CERVICAL: ICD-10-CM

## 2025-01-30 DIAGNOSIS — M47.816 LUMBAR FACET ARTHROPATHY: ICD-10-CM

## 2025-01-30 DIAGNOSIS — G89.4 CHRONIC PAIN SYNDROME: ICD-10-CM

## 2025-01-30 DIAGNOSIS — M53.3 SACROILIAC JOINT DYSFUNCTION: ICD-10-CM

## 2025-01-30 RX ORDER — ACETAMINOPHEN AND CODEINE PHOSPHATE 300; 30 MG/1; MG/1
1 TABLET ORAL DAILY PRN
Qty: 30 TABLET | Refills: 2 | Status: SHIPPED | OUTPATIENT
Start: 2025-01-30 | End: 2025-04-30

## 2025-01-30 NOTE — PROGRESS NOTES
Tylenol #3 refill sent to pharmacy-- pt states that pharmacy does not have the previous prescription.

## 2025-02-11 NOTE — PROGRESS NOTES
[facility]  [address], [address]  [place] [address]  Phone: [phone]  Fax: [phone]    Patient's YOB: 1945    ASSESSMENT   Laurie was seen today for back problem.    Diagnoses and all orders for this visit:    Cervical facet joint syndrome  -     Amb External Referral To Pain Medicine    Cervical pain  -     Amb External Referral To Pain Medicine    Cervical spinal stenosis  -     Amb External Referral To Pain Medicine    Chronic pain syndrome    Lumbar facet arthropathy    Sacroiliac joint dysfunction    Gait abnormality    Long term (current) use of anticoagulants         IMPRESSION AND PLAN:  Laurie Hobson is a 79 y.o. female with history of  L2-5 TLIF, B/L SIJ fusion, HTN, osteopenia, migraine, Afib, PMR. Pt complains of cervical pain. Since last visit, pt feels her symptoms have continued at the same level. She is taking Tylenol #3 300-30mg QD PRN. Patient utilized Voltaren 1% Gel with benefit.     Discussed treatment options with the patient including dry needling, medial branch neurotomy, and cervical steroid injections.   Reviewed Cervical Spine MRI WO Contrast from 1/7/25 in depth with patient.   Patient does not need Tylenol #3 refill d/t obtaining refills on 1/27/25.   External referral to Pain Management - Dr. Cabrera for cervical facet injections.   Ms. Hobson has a reminder for a \"due or due soon\" health maintenance. I have asked that she contact her primary care provider, Selma Angel MD, for follow-up on this health maintenance.   demonstrated consistency with prescribing.   Patient is taking Eliquis and is not a candidate for NSAIDs.    Return in about 2 months (around 4/13/2025) for Medication follow up.      HISTORY OF PRESENT ILLNESS:  Laurie Hobson is a 79 y.o. RHD female who presents to the office today for a diagnostic and medication follow up. At her last OV (12/19/24), Ordered Cervical Spine 2V XR

## 2025-02-13 ENCOUNTER — OFFICE VISIT (OUTPATIENT)
Age: 80
End: 2025-02-13
Payer: MEDICARE

## 2025-02-13 VITALS
RESPIRATION RATE: 20 BRPM | BODY MASS INDEX: 32.56 KG/M2 | HEART RATE: 94 BPM | OXYGEN SATURATION: 98 % | WEIGHT: 178 LBS | TEMPERATURE: 97 F

## 2025-02-13 DIAGNOSIS — Z79.01 LONG TERM (CURRENT) USE OF ANTICOAGULANTS: ICD-10-CM

## 2025-02-13 DIAGNOSIS — R26.9 GAIT ABNORMALITY: ICD-10-CM

## 2025-02-13 DIAGNOSIS — M54.2 CERVICAL PAIN: ICD-10-CM

## 2025-02-13 DIAGNOSIS — M53.3 SACROILIAC JOINT DYSFUNCTION: ICD-10-CM

## 2025-02-13 DIAGNOSIS — M47.816 LUMBAR FACET ARTHROPATHY: ICD-10-CM

## 2025-02-13 DIAGNOSIS — M47.812 CERVICAL FACET JOINT SYNDROME: Primary | ICD-10-CM

## 2025-02-13 DIAGNOSIS — M48.02 CERVICAL SPINAL STENOSIS: ICD-10-CM

## 2025-02-13 DIAGNOSIS — G89.4 CHRONIC PAIN SYNDROME: ICD-10-CM

## 2025-02-13 PROCEDURE — G8417 CALC BMI ABV UP PARAM F/U: HCPCS | Performed by: PHYSICAL MEDICINE & REHABILITATION

## 2025-02-13 PROCEDURE — G8400 PT W/DXA NO RESULTS DOC: HCPCS | Performed by: PHYSICAL MEDICINE & REHABILITATION

## 2025-02-13 PROCEDURE — 1160F RVW MEDS BY RX/DR IN RCRD: CPT | Performed by: PHYSICAL MEDICINE & REHABILITATION

## 2025-02-13 PROCEDURE — 1125F AMNT PAIN NOTED PAIN PRSNT: CPT | Performed by: PHYSICAL MEDICINE & REHABILITATION

## 2025-02-13 PROCEDURE — 1090F PRES/ABSN URINE INCON ASSESS: CPT | Performed by: PHYSICAL MEDICINE & REHABILITATION

## 2025-02-13 PROCEDURE — 1036F TOBACCO NON-USER: CPT | Performed by: PHYSICAL MEDICINE & REHABILITATION

## 2025-02-13 PROCEDURE — G8427 DOCREV CUR MEDS BY ELIG CLIN: HCPCS | Performed by: PHYSICAL MEDICINE & REHABILITATION

## 2025-02-13 PROCEDURE — 1123F ACP DISCUSS/DSCN MKR DOCD: CPT | Performed by: PHYSICAL MEDICINE & REHABILITATION

## 2025-02-13 PROCEDURE — 99214 OFFICE O/P EST MOD 30 MIN: CPT | Performed by: PHYSICAL MEDICINE & REHABILITATION

## 2025-02-13 PROCEDURE — 1159F MED LIST DOCD IN RCRD: CPT | Performed by: PHYSICAL MEDICINE & REHABILITATION

## 2025-04-08 NOTE — PROGRESS NOTES
[facility]  [address], [address]  [place] [address]  Phone: [phone]  Fax: [phone]    Patient's YOB: 1945    ASSESSMENT   Laurie was seen today for neck pain.    Diagnoses and all orders for this visit:    Chronic pain syndrome  -     acetaminophen-codeine (TYLENOL #3) 300-30 MG per tablet; Take 1 tablet by mouth daily as needed for Pain for up to 90 days. Max Daily Amount: 1 tablet    Use of opiates for therapeutic purposes  -     acetaminophen-codeine (TYLENOL #3) 300-30 MG per tablet; Take 1 tablet by mouth daily as needed for Pain for up to 90 days. Max Daily Amount: 1 tablet    Cervical facet joint syndrome  -     acetaminophen-codeine (TYLENOL #3) 300-30 MG per tablet; Take 1 tablet by mouth daily as needed for Pain for up to 90 days. Max Daily Amount: 1 tablet    Cervical pain  -     acetaminophen-codeine (TYLENOL #3) 300-30 MG per tablet; Take 1 tablet by mouth daily as needed for Pain for up to 90 days. Max Daily Amount: 1 tablet    Lumbar facet arthropathy  -     acetaminophen-codeine (TYLENOL #3) 300-30 MG per tablet; Take 1 tablet by mouth daily as needed for Pain for up to 90 days. Max Daily Amount: 1 tablet    Sacroiliac joint dysfunction  -     acetaminophen-codeine (TYLENOL #3) 300-30 MG per tablet; Take 1 tablet by mouth daily as needed for Pain for up to 90 days. Max Daily Amount: 1 tablet    PMR (polymyalgia rheumatica)    Long term (current) use of anticoagulants         IMPRESSION AND PLAN:  Laurie Hobson is a 79 y.o. female with history of L2-5 TLIF, B/L SIJ fusion, HTN, osteopenia, migraine, Afib, and PMR. Pt complains of cervical pain. Since last visit, pt feels her symptoms have been manageable. She is taking Tylenol #3 300-30mg QD PRN. Patient utilized Voltaren 1% Gel with benefit.     Educated patient on the importance of healthy nutrition.   Patient was given information on medial branch

## 2025-04-17 ENCOUNTER — OFFICE VISIT (OUTPATIENT)
Age: 80
End: 2025-04-17
Payer: MEDICARE

## 2025-04-17 VITALS
HEART RATE: 52 BPM | OXYGEN SATURATION: 99 % | WEIGHT: 180 LBS | HEIGHT: 62 IN | TEMPERATURE: 98 F | BODY MASS INDEX: 33.13 KG/M2

## 2025-04-17 DIAGNOSIS — Z79.891 USE OF OPIATES FOR THERAPEUTIC PURPOSES: ICD-10-CM

## 2025-04-17 DIAGNOSIS — M53.3 SACROILIAC JOINT DYSFUNCTION: ICD-10-CM

## 2025-04-17 DIAGNOSIS — Z79.01 LONG TERM (CURRENT) USE OF ANTICOAGULANTS: ICD-10-CM

## 2025-04-17 DIAGNOSIS — G89.4 CHRONIC PAIN SYNDROME: Primary | ICD-10-CM

## 2025-04-17 DIAGNOSIS — M35.3 PMR (POLYMYALGIA RHEUMATICA): ICD-10-CM

## 2025-04-17 DIAGNOSIS — M47.816 LUMBAR FACET ARTHROPATHY: ICD-10-CM

## 2025-04-17 DIAGNOSIS — M47.812 CERVICAL FACET JOINT SYNDROME: ICD-10-CM

## 2025-04-17 DIAGNOSIS — M54.2 CERVICAL PAIN: ICD-10-CM

## 2025-04-17 PROCEDURE — G8427 DOCREV CUR MEDS BY ELIG CLIN: HCPCS | Performed by: PHYSICAL MEDICINE & REHABILITATION

## 2025-04-17 PROCEDURE — G8400 PT W/DXA NO RESULTS DOC: HCPCS | Performed by: PHYSICAL MEDICINE & REHABILITATION

## 2025-04-17 PROCEDURE — 1159F MED LIST DOCD IN RCRD: CPT | Performed by: PHYSICAL MEDICINE & REHABILITATION

## 2025-04-17 PROCEDURE — 1125F AMNT PAIN NOTED PAIN PRSNT: CPT | Performed by: PHYSICAL MEDICINE & REHABILITATION

## 2025-04-17 PROCEDURE — G8417 CALC BMI ABV UP PARAM F/U: HCPCS | Performed by: PHYSICAL MEDICINE & REHABILITATION

## 2025-04-17 PROCEDURE — 1123F ACP DISCUSS/DSCN MKR DOCD: CPT | Performed by: PHYSICAL MEDICINE & REHABILITATION

## 2025-04-17 PROCEDURE — 1036F TOBACCO NON-USER: CPT | Performed by: PHYSICAL MEDICINE & REHABILITATION

## 2025-04-17 PROCEDURE — 99214 OFFICE O/P EST MOD 30 MIN: CPT | Performed by: PHYSICAL MEDICINE & REHABILITATION

## 2025-04-17 PROCEDURE — 1160F RVW MEDS BY RX/DR IN RCRD: CPT | Performed by: PHYSICAL MEDICINE & REHABILITATION

## 2025-04-17 PROCEDURE — 1090F PRES/ABSN URINE INCON ASSESS: CPT | Performed by: PHYSICAL MEDICINE & REHABILITATION

## 2025-04-17 RX ORDER — PREDNISONE 1 MG/1
TABLET ORAL
COMMUNITY
Start: 2025-03-15

## 2025-04-17 RX ORDER — DENOSUMAB 60 MG/ML
60 INJECTION SUBCUTANEOUS
COMMUNITY

## 2025-04-17 RX ORDER — ACETAMINOPHEN AND CODEINE PHOSPHATE 300; 30 MG/1; MG/1
1 TABLET ORAL DAILY PRN
Qty: 30 TABLET | Refills: 2 | Status: SHIPPED | OUTPATIENT
Start: 2025-05-05 | End: 2025-08-03

## 2025-07-15 NOTE — PROGRESS NOTES
[facility]  [address], [address]  [place] [address]  Phone: [phone]  Fax: [phone]    Patient's YOB: 1945    ASSESSMENT   Laurie was seen today for follow-up.    Diagnoses and all orders for this visit:    Chronic pain syndrome  -     acetaminophen-codeine (TYLENOL #3) 300-30 MG per tablet; Take 1 tablet by mouth daily as needed for Pain for up to 90 days. Max Daily Amount: 1 tablet    Use of opiates for therapeutic purposes  -     acetaminophen-codeine (TYLENOL #3) 300-30 MG per tablet; Take 1 tablet by mouth daily as needed for Pain for up to 90 days. Max Daily Amount: 1 tablet    Cervical facet joint syndrome  -     acetaminophen-codeine (TYLENOL #3) 300-30 MG per tablet; Take 1 tablet by mouth daily as needed for Pain for up to 90 days. Max Daily Amount: 1 tablet    Lumbar facet arthropathy  -     acetaminophen-codeine (TYLENOL #3) 300-30 MG per tablet; Take 1 tablet by mouth daily as needed for Pain for up to 90 days. Max Daily Amount: 1 tablet    Sacroiliac joint dysfunction  -     acetaminophen-codeine (TYLENOL #3) 300-30 MG per tablet; Take 1 tablet by mouth daily as needed for Pain for up to 90 days. Max Daily Amount: 1 tablet    PMR (polymyalgia rheumatica)    Gait abnormality    Cervical pain  -     acetaminophen-codeine (TYLENOL #3) 300-30 MG per tablet; Take 1 tablet by mouth daily as needed for Pain for up to 90 days. Max Daily Amount: 1 tablet         IMPRESSION AND PLAN:  Laurie Hobson is a 79 y.o. female with history of L2-5 TLIF, B/L SIJ fusion, HTN, osteopenia, migraine, Afib, and PMR. Pt complains of cervical and lumbar pain. Since last visit, pt feels her symptoms have been manageable. She is taking Tylenol #3 300-30mg QD PRN. Patient utilized Voltaren 1% Gel with benefit.     Ms. Hobson has a reminder for a \"due or due soon\" health maintenance. I have asked that she contact her primary care provider,

## 2025-07-24 ENCOUNTER — OFFICE VISIT (OUTPATIENT)
Age: 80
End: 2025-07-24
Payer: MEDICARE

## 2025-07-24 VITALS
HEART RATE: 77 BPM | BODY MASS INDEX: 32.2 KG/M2 | WEIGHT: 175 LBS | OXYGEN SATURATION: 97 % | TEMPERATURE: 97.9 F | HEIGHT: 62 IN

## 2025-07-24 DIAGNOSIS — Z79.891 USE OF OPIATES FOR THERAPEUTIC PURPOSES: ICD-10-CM

## 2025-07-24 DIAGNOSIS — M35.3 PMR (POLYMYALGIA RHEUMATICA): ICD-10-CM

## 2025-07-24 DIAGNOSIS — M54.2 CERVICAL PAIN: ICD-10-CM

## 2025-07-24 DIAGNOSIS — G89.4 CHRONIC PAIN SYNDROME: Primary | ICD-10-CM

## 2025-07-24 DIAGNOSIS — M53.3 SACROILIAC JOINT DYSFUNCTION: ICD-10-CM

## 2025-07-24 DIAGNOSIS — M47.812 CERVICAL FACET JOINT SYNDROME: ICD-10-CM

## 2025-07-24 DIAGNOSIS — M47.816 LUMBAR FACET ARTHROPATHY: ICD-10-CM

## 2025-07-24 DIAGNOSIS — R26.9 GAIT ABNORMALITY: ICD-10-CM

## 2025-07-24 PROCEDURE — G8427 DOCREV CUR MEDS BY ELIG CLIN: HCPCS | Performed by: PHYSICAL MEDICINE & REHABILITATION

## 2025-07-24 PROCEDURE — 1160F RVW MEDS BY RX/DR IN RCRD: CPT | Performed by: PHYSICAL MEDICINE & REHABILITATION

## 2025-07-24 PROCEDURE — 1125F AMNT PAIN NOTED PAIN PRSNT: CPT | Performed by: PHYSICAL MEDICINE & REHABILITATION

## 2025-07-24 PROCEDURE — 1036F TOBACCO NON-USER: CPT | Performed by: PHYSICAL MEDICINE & REHABILITATION

## 2025-07-24 PROCEDURE — 99213 OFFICE O/P EST LOW 20 MIN: CPT | Performed by: PHYSICAL MEDICINE & REHABILITATION

## 2025-07-24 PROCEDURE — 1159F MED LIST DOCD IN RCRD: CPT | Performed by: PHYSICAL MEDICINE & REHABILITATION

## 2025-07-24 PROCEDURE — 1090F PRES/ABSN URINE INCON ASSESS: CPT | Performed by: PHYSICAL MEDICINE & REHABILITATION

## 2025-07-24 PROCEDURE — G8400 PT W/DXA NO RESULTS DOC: HCPCS | Performed by: PHYSICAL MEDICINE & REHABILITATION

## 2025-07-24 PROCEDURE — 1123F ACP DISCUSS/DSCN MKR DOCD: CPT | Performed by: PHYSICAL MEDICINE & REHABILITATION

## 2025-07-24 PROCEDURE — G8417 CALC BMI ABV UP PARAM F/U: HCPCS | Performed by: PHYSICAL MEDICINE & REHABILITATION

## 2025-07-24 RX ORDER — ROSUVASTATIN CALCIUM 20 MG/1
TABLET, COATED ORAL
COMMUNITY
Start: 2025-04-26

## 2025-07-24 RX ORDER — ACETAMINOPHEN AND CODEINE PHOSPHATE 300; 30 MG/1; MG/1
1 TABLET ORAL DAILY PRN
Qty: 30 TABLET | Refills: 1 | Status: SHIPPED | OUTPATIENT
Start: 2025-07-24 | End: 2025-10-22

## 2025-07-24 RX ORDER — DIAZEPAM 10 MG/1
TABLET ORAL
COMMUNITY
Start: 2025-05-13

## 2025-07-24 RX ORDER — OMEPRAZOLE 40 MG/1
CAPSULE, DELAYED RELEASE ORAL
COMMUNITY
Start: 2025-07-11

## 2025-07-24 RX ORDER — ESTRADIOL 0.1 MG/G
CREAM VAGINAL
COMMUNITY
Start: 2025-05-13

## (undated) DEVICE — MEDIA CONTRAST 10ML 200MG/ML 41%

## (undated) DEVICE — (D)BNDG ADHESIVE FABRIC 3/4X3 -- DISC BY MFR USE ITEM 357960

## (undated) DEVICE — CANNULA NSL AD TBNG L14FT STD PVC O2 CRV CONN NONFLARED NSL

## (undated) DEVICE — SYR 10ML LUER LOK 1/5ML GRAD --

## (undated) DEVICE — BANDAGE ADH W0.75XL3IN UNIV WVN FAB NAT GEN USE STRP N ADH

## (undated) DEVICE — (D)NDL SPNE 22GX15CM -- DISC BY MFR W/NO SUB

## (undated) DEVICE — NDL SPNE MANAN 22GX6IN --

## (undated) DEVICE — GAUZE,SPONGE,4"X4",16PLY,STRL,LF,10/TRAY: Brand: MEDLINE

## (undated) DEVICE — CANNULA ORIG TL CLR W FOAM CUSHIONS AND 14FT SUPL TB 3 CHN

## (undated) DEVICE — TRAY MYEL SFTY +

## (undated) DEVICE — SYRINGE MED 10ML LUERLOCK TIP W/O SFTY DISP

## (undated) DEVICE — CATHETER SUCT TR FL TIP 14FR W/ O CTRL

## (undated) DEVICE — SYRINGE MED 25GA 3ML L5/8IN SUBQ PLAS W/ DETACH NDL SFTY

## (undated) DEVICE — UNDERPAD INCONT W23XL36IN STD BLU POLYPR BK FLUF SFT

## (undated) DEVICE — BASIN EMSIS 16OZ GRAPHITE PLAS KID SHP MOLD GRAD FOR ORAL

## (undated) DEVICE — YANKAUER,SMOOTH HANDLE,HIGH CAPACITY: Brand: MEDLINE INDUSTRIES, INC.

## (undated) DEVICE — (D)SYR 10ML 1/5ML GRAD NSAF -- PKGING CHANGE USE ITEM 338027

## (undated) DEVICE — ENDOSCOPY PUMP TUBING/ CAP SET: Brand: ERBE

## (undated) DEVICE — GOWN PLASTIC FILM THMBHKS UNIV BLUE: Brand: CARDINAL HEALTH

## (undated) DEVICE — LINER SUCT CANSTR 3000CC PLAS SFT PRE ASSEMB W/OUT TBNG W/

## (undated) DEVICE — STERILE POLYISOPRENE POWDER-FREE SURGICAL GLOVES: Brand: PROTEXIS

## (undated) DEVICE — FORCEPS BX L240CM JAW DIA2.8MM L CAP W/ NDL MIC MESH TOOTH

## (undated) DEVICE — FORCEPS BX L240CM JAW DIA2.4MM ORNG L CAP W/ NDL DISP RAD

## (undated) DEVICE — SOLUTION IRRIG 1000ML H2O STRL BLT

## (undated) DEVICE — SYRINGE MED 50ML LUERSLIP TIP

## (undated) DEVICE — SNARE VASC L240CM LOOP W10MM SHTH DIA2.4MM RND STIFF CLD

## (undated) DEVICE — MEDI-VAC NON-CONDUCTIVE SUCTION TUBING: Brand: CARDINAL HEALTH

## (undated) DEVICE — SYRINGE 20ML LL S/C 50

## (undated) DEVICE — SNARE POLYP M W27MMXL240CM OVL STIFF DISP CAPTIVATOR

## (undated) DEVICE — BITE BLOCK ENDOSCP UNIV AD 6 TO 9.4 MM